# Patient Record
Sex: FEMALE | Race: WHITE | Employment: OTHER | URBAN - METROPOLITAN AREA
[De-identification: names, ages, dates, MRNs, and addresses within clinical notes are randomized per-mention and may not be internally consistent; named-entity substitution may affect disease eponyms.]

---

## 2017-07-03 ENCOUNTER — TELEPHONE (OUTPATIENT)
Dept: INTERNAL MEDICINE CLINIC | Facility: CLINIC | Age: 82
End: 2017-07-03

## 2017-07-03 DIAGNOSIS — J44.9 CHRONIC OBSTRUCTIVE PULMONARY DISEASE, UNSPECIFIED COPD TYPE (HCC): ICD-10-CM

## 2017-07-03 DIAGNOSIS — Z51.81 MEDICATION MONITORING ENCOUNTER: ICD-10-CM

## 2017-07-03 DIAGNOSIS — E78.2 MIXED HYPERLIPIDEMIA: Primary | ICD-10-CM

## 2017-07-03 DIAGNOSIS — M16.11 ARTHRITIS OF RIGHT HIP: ICD-10-CM

## 2017-07-03 RX ORDER — ATORVASTATIN CALCIUM 10 MG/1
TABLET, FILM COATED ORAL
Qty: 30 TABLET | Refills: 0 | Status: SHIPPED | OUTPATIENT
Start: 2017-07-03 | End: 2017-07-10

## 2017-07-03 NOTE — TELEPHONE ENCOUNTER
Refilled per protocol for 30 days until next office visit on 7/10/17    Cholesterol Medications  Protocol Criteria:  · Appointment scheduled in the past 12 months or in the next 3 months  · ALT & LDL on file in the past 12 months  · ALT result < 80  · LDL

## 2017-07-03 NOTE — TELEPHONE ENCOUNTER
Patient is calling to request a refill for the medication listed below. Patient is a former patient of Dr. Jason Herrera but will be establishing care with Dr. Lundy Schilder for 07/10 at 11:40. Please advise.    Atorvastatin Calcium 10 MG Oral Tab Take 1 tablet (10 mg t

## 2017-07-03 NOTE — TELEPHONE ENCOUNTER
Please call the pt and tell them that I have sent the orders to the lab using the computer. Pt just needs to go to the lab. Pt should fast for 12 hours. Water and medications are okay.

## 2017-07-03 NOTE — TELEPHONE ENCOUNTER
Patient is calling to request lab orders for cholesterol, a1d, etc. Patient is a former patient of Dr. Adrien Romero but will be establishing care with Dr. Sheila Montes for 07/10 at 11:40. Patient wants to get labs done beforehand. Please advise.

## 2017-07-07 ENCOUNTER — LAB ENCOUNTER (OUTPATIENT)
Dept: LAB | Age: 82
End: 2017-07-07
Attending: INTERNAL MEDICINE
Payer: MEDICARE

## 2017-07-07 DIAGNOSIS — E78.2 MIXED HYPERLIPIDEMIA: ICD-10-CM

## 2017-07-07 DIAGNOSIS — Z51.81 MEDICATION MONITORING ENCOUNTER: ICD-10-CM

## 2017-07-07 DIAGNOSIS — M16.11 ARTHRITIS OF RIGHT HIP: ICD-10-CM

## 2017-07-07 DIAGNOSIS — J44.9 CHRONIC OBSTRUCTIVE PULMONARY DISEASE, UNSPECIFIED COPD TYPE (HCC): ICD-10-CM

## 2017-07-07 LAB
ALBUMIN SERPL BCP-MCNC: 3.9 G/DL (ref 3.5–4.8)
ALBUMIN/GLOB SERPL: 1.6 {RATIO} (ref 1–2)
ALP SERPL-CCNC: 71 U/L (ref 32–100)
ALT SERPL-CCNC: 15 U/L (ref 14–54)
ANION GAP SERPL CALC-SCNC: 8 MMOL/L (ref 0–18)
AST SERPL-CCNC: 21 U/L (ref 15–41)
BASOPHILS # BLD: 0.1 K/UL (ref 0–0.2)
BASOPHILS NFR BLD: 1 %
BILIRUB SERPL-MCNC: 1.3 MG/DL (ref 0.3–1.2)
BILIRUB UR QL: NEGATIVE
BUN SERPL-MCNC: 12 MG/DL (ref 8–20)
BUN/CREAT SERPL: 14.3 (ref 10–20)
CALCIUM SERPL-MCNC: 8.9 MG/DL (ref 8.5–10.5)
CHLORIDE SERPL-SCNC: 107 MMOL/L (ref 95–110)
CHOLEST SERPL-MCNC: 169 MG/DL (ref 110–200)
CLARITY UR: CLEAR
CO2 SERPL-SCNC: 27 MMOL/L (ref 22–32)
COLOR UR: YELLOW
CREAT SERPL-MCNC: 0.84 MG/DL (ref 0.5–1.5)
EOSINOPHIL # BLD: 0.2 K/UL (ref 0–0.7)
EOSINOPHIL NFR BLD: 3 %
ERYTHROCYTE [DISTWIDTH] IN BLOOD BY AUTOMATED COUNT: 13.5 % (ref 11–15)
GLOBULIN PLAS-MCNC: 2.5 G/DL (ref 2.5–3.7)
GLUCOSE SERPL-MCNC: 87 MG/DL (ref 70–99)
GLUCOSE UR-MCNC: NEGATIVE MG/DL
HCT VFR BLD AUTO: 44.3 % (ref 35–48)
HDLC SERPL-MCNC: 65 MG/DL
HGB BLD-MCNC: 15.2 G/DL (ref 12–16)
HGB UR QL STRIP.AUTO: NEGATIVE
KETONES UR-MCNC: NEGATIVE MG/DL
LDLC SERPL CALC-MCNC: 88 MG/DL (ref 0–99)
LYMPHOCYTES # BLD: 1.4 K/UL (ref 1–4)
LYMPHOCYTES NFR BLD: 22 %
MCH RBC QN AUTO: 32.6 PG (ref 27–32)
MCHC RBC AUTO-ENTMCNC: 34.2 G/DL (ref 32–37)
MCV RBC AUTO: 95.2 FL (ref 80–100)
MONOCYTES # BLD: 0.5 K/UL (ref 0–1)
MONOCYTES NFR BLD: 8 %
NEUTROPHILS # BLD AUTO: 4.1 K/UL (ref 1.8–7.7)
NEUTROPHILS NFR BLD: 66 %
NITRITE UR QL STRIP.AUTO: NEGATIVE
NONHDLC SERPL-MCNC: 104 MG/DL
OSMOLALITY UR CALC.SUM OF ELEC: 293 MOSM/KG (ref 275–295)
PH UR: 5 [PH] (ref 5–8)
PLATELET # BLD AUTO: 169 K/UL (ref 140–400)
PMV BLD AUTO: 8.6 FL (ref 7.4–10.3)
POTASSIUM SERPL-SCNC: 3.7 MMOL/L (ref 3.3–5.1)
PROT SERPL-MCNC: 6.4 G/DL (ref 5.9–8.4)
PROT UR-MCNC: 30 MG/DL
RBC # BLD AUTO: 4.65 M/UL (ref 3.7–5.4)
RBC #/AREA URNS AUTO: 1 /HPF
SODIUM SERPL-SCNC: 142 MMOL/L (ref 136–144)
SP GR UR STRIP: 1.03 (ref 1–1.03)
TRIGL SERPL-MCNC: 78 MG/DL (ref 1–149)
TSH SERPL-ACNC: 1.67 UIU/ML (ref 0.45–5.33)
UROBILINOGEN UR STRIP-ACNC: <2
VIT C UR-MCNC: 40 MG/DL
WBC # BLD AUTO: 6.3 K/UL (ref 4–11)
WBC #/AREA URNS AUTO: 5 /HPF

## 2017-07-07 PROCEDURE — 80053 COMPREHEN METABOLIC PANEL: CPT

## 2017-07-07 PROCEDURE — 36415 COLL VENOUS BLD VENIPUNCTURE: CPT

## 2017-07-07 PROCEDURE — 80061 LIPID PANEL: CPT

## 2017-07-07 PROCEDURE — 84443 ASSAY THYROID STIM HORMONE: CPT

## 2017-07-07 PROCEDURE — 81001 URINALYSIS AUTO W/SCOPE: CPT | Performed by: INTERNAL MEDICINE

## 2017-07-07 PROCEDURE — 85025 COMPLETE CBC W/AUTO DIFF WBC: CPT

## 2017-07-07 RX ORDER — BUDESONIDE AND FORMOTEROL FUMARATE DIHYDRATE 160; 4.5 UG/1; UG/1
AEROSOL RESPIRATORY (INHALATION)
Qty: 1 INHALER | Refills: 0 | Status: SHIPPED | OUTPATIENT
Start: 2017-07-07 | End: 2017-08-04

## 2017-07-07 NOTE — TELEPHONE ENCOUNTER
Refill Protocol Appointment Criteria  · Appointment scheduled in the past 12 months or in the next 3 months  Recent Outpatient Visits            10 months ago Chronic obstructive pulmonary disease, unspecified COPD type [facility])    [address]

## 2017-07-10 ENCOUNTER — OFFICE VISIT (OUTPATIENT)
Dept: INTERNAL MEDICINE CLINIC | Facility: CLINIC | Age: 82
End: 2017-07-10

## 2017-07-10 VITALS
DIASTOLIC BLOOD PRESSURE: 72 MMHG | SYSTOLIC BLOOD PRESSURE: 123 MMHG | WEIGHT: 147.38 LBS | HEIGHT: 66 IN | RESPIRATION RATE: 18 BRPM | BODY MASS INDEX: 23.69 KG/M2

## 2017-07-10 DIAGNOSIS — M25.561 CHRONIC PAIN OF BOTH KNEES: Primary | ICD-10-CM

## 2017-07-10 DIAGNOSIS — R82.71 BACTERIURIA: ICD-10-CM

## 2017-07-10 DIAGNOSIS — Z12.31 VISIT FOR SCREENING MAMMOGRAM: ICD-10-CM

## 2017-07-10 DIAGNOSIS — E78.2 MIXED HYPERLIPIDEMIA: ICD-10-CM

## 2017-07-10 DIAGNOSIS — G89.29 CHRONIC PAIN OF BOTH KNEES: Primary | ICD-10-CM

## 2017-07-10 DIAGNOSIS — J44.9 CHRONIC OBSTRUCTIVE PULMONARY DISEASE, UNSPECIFIED COPD TYPE (HCC): ICD-10-CM

## 2017-07-10 DIAGNOSIS — M25.562 CHRONIC PAIN OF BOTH KNEES: Primary | ICD-10-CM

## 2017-07-10 DIAGNOSIS — Z23 NEED FOR VACCINATION: ICD-10-CM

## 2017-07-10 LAB
APPEARANCE: CLEAR
BILIRUBIN: NEGATIVE
GLUCOSE (URINE DIPSTICK): NEGATIVE MG/DL
KETONES (URINE DIPSTICK): NEGATIVE MG/DL
LEUKOCYTES: NEGATIVE
MULTISTIX LOT#: NORMAL NUMERIC
NITRITE, URINE: NEGATIVE
OCCULT BLOOD: NEGATIVE
PH, URINE: 5 (ref 4.5–8)
PROTEIN (URINE DIPSTICK): NEGATIVE MG/DL
SPECIFIC GRAVITY: 1.03 (ref 1–1.03)
URINE-COLOR: YELLOW
UROBILINOGEN,SEMI-QN: 0.2 MG/DL (ref 0–1.9)

## 2017-07-10 PROCEDURE — G0009 ADMIN PNEUMOCOCCAL VACCINE: HCPCS | Performed by: INTERNAL MEDICINE

## 2017-07-10 PROCEDURE — 90670 PCV13 VACCINE IM: CPT | Performed by: INTERNAL MEDICINE

## 2017-07-10 PROCEDURE — 99214 OFFICE O/P EST MOD 30 MIN: CPT | Performed by: INTERNAL MEDICINE

## 2017-07-10 PROCEDURE — G0463 HOSPITAL OUTPT CLINIC VISIT: HCPCS | Performed by: INTERNAL MEDICINE

## 2017-07-10 PROCEDURE — 81002 URINALYSIS NONAUTO W/O SCOPE: CPT | Performed by: INTERNAL MEDICINE

## 2017-07-10 RX ORDER — ATORVASTATIN CALCIUM 10 MG/1
10 TABLET, FILM COATED ORAL
Qty: 90 TABLET | Refills: 1 | Status: SHIPPED | OUTPATIENT
Start: 2017-07-10 | End: 2018-01-11

## 2017-07-10 NOTE — PROGRESS NOTES
HPI:    Patient ID: Flako Branham is a 80year old female. Pt is a former pt of Dr. Ck Lugo. She has a lot of arthritis. She has knee problems and back pain. She had a THR a year ago. She doesn't want more replacement. Her breathing is okay.   Claritza Rodriguez Allergies:  Neosporin Original *    Itching   PHYSICAL EXAM:   Physical Exam   Nursing note and vitals reviewed. Constitutional: She is oriented to person, place, and time. She appears well-developed and well-nourished.    HENT:   Head: Normocephalic

## 2017-07-10 NOTE — ADDENDUM NOTE
Encounter addended by:  Isra Mcknight LPN on: 5/00/3299  3:46 PM<BR>    Actions taken:  activity accessed

## 2017-08-04 ENCOUNTER — OFFICE VISIT (OUTPATIENT)
Dept: OPTOMETRY | Facility: CLINIC | Age: 82
End: 2017-08-04

## 2017-08-04 DIAGNOSIS — H52.4 PRESBYOPIA: ICD-10-CM

## 2017-08-04 DIAGNOSIS — H35.30 MACULAR DEGENERATION: Primary | ICD-10-CM

## 2017-08-04 PROCEDURE — 92004 COMPRE OPH EXAM NEW PT 1/>: CPT | Performed by: OPTOMETRIST

## 2017-08-04 NOTE — ASSESSMENT & PLAN NOTE
I advised patient that she has dry macular degeneration and that she should continue to use her I caps and wear sunglasses during all outdoor activities. Patient does not smoke .  I advised patient to continue to monitor her vision at far and at near when s

## 2017-08-04 NOTE — PATIENT INSTRUCTIONS
Macular degeneration  I advised patient that she has dry macular degeneration and that she should continue to use her I caps and wear sunglasses during all outdoor activities. Patient does not smoke .  I advised patient to continue to monitor her vision at

## 2017-08-04 NOTE — PROGRESS NOTES
Ivelisse Medina is a 80year old female. HPI:     HPI     Patient is in for an annual eye exam. Patient last had an EE at Dr. Lynda Mi office. She had cataract surgery ou at his office and was told that she had macular degeneration .   She takes I cap 160-4.5 MCG/ACT Inhalation Aerosol INHALE 2 PUFFS BY MOUTH TWO TIMES A DAY Disp: 1 Inhaler Rfl: 0   Multiple Vitamins-Minerals (ICAPS AREDS FORMULA) Oral Tab Take by mouth.  Disp:  Rfl:    Albuterol Sulfate HFA (PROAIR HFA) 108 (90 BASE) MCG/ACT Inhalation degeneration dry Age related macular degeneration dry    Vessels Normal Normal    Periphery Normal Normal            Refraction     Wearing Rx       Sphere Cylinder    Right +2.75 Sphere    Left +2.75 Sphere    Type:  OTC reading only          Manifest Ref

## 2017-08-07 ENCOUNTER — OFFICE VISIT (OUTPATIENT)
Dept: ORTHOPEDICS CLINIC | Facility: CLINIC | Age: 82
End: 2017-08-07

## 2017-08-07 ENCOUNTER — HOSPITAL ENCOUNTER (OUTPATIENT)
Dept: GENERAL RADIOLOGY | Facility: HOSPITAL | Age: 82
Discharge: HOME | End: 2017-08-07
Attending: ORTHOPAEDIC SURGERY | Admitting: ORTHOPAEDIC SURGERY
Payer: MEDICARE

## 2017-08-07 VITALS — SYSTOLIC BLOOD PRESSURE: 134 MMHG | DIASTOLIC BLOOD PRESSURE: 82 MMHG | HEART RATE: 80 BPM | RESPIRATION RATE: 16 BRPM

## 2017-08-07 DIAGNOSIS — M25.562 PAIN IN BOTH KNEES, UNSPECIFIED CHRONICITY: ICD-10-CM

## 2017-08-07 DIAGNOSIS — M17.12 PRIMARY LOCALIZED OSTEOARTHROSIS, LOWER LEG, LEFT: ICD-10-CM

## 2017-08-07 DIAGNOSIS — M25.561 PAIN IN BOTH KNEES, UNSPECIFIED CHRONICITY: Primary | ICD-10-CM

## 2017-08-07 DIAGNOSIS — M25.562 PAIN IN BOTH KNEES, UNSPECIFIED CHRONICITY: Primary | ICD-10-CM

## 2017-08-07 DIAGNOSIS — M25.561 PAIN IN BOTH KNEES, UNSPECIFIED CHRONICITY: ICD-10-CM

## 2017-08-07 DIAGNOSIS — M17.11 PRIMARY LOCALIZED OSTEOARTHROSIS, LOWER LEG, RIGHT: ICD-10-CM

## 2017-08-07 PROCEDURE — 20610 DRAIN/INJ JOINT/BURSA W/O US: CPT | Performed by: ORTHOPAEDIC SURGERY

## 2017-08-07 PROCEDURE — 99214 OFFICE O/P EST MOD 30 MIN: CPT | Performed by: ORTHOPAEDIC SURGERY

## 2017-08-07 PROCEDURE — 3E0U3GC INTRODUCTION OF OTHER THERAPEUTIC SUBSTANCE INTO JOINTS, PERCUTANEOUS APPROACH: ICD-10-PCS | Performed by: ORTHOPAEDIC SURGERY

## 2017-08-07 PROCEDURE — 73565 X-RAY EXAM OF KNEES: CPT | Performed by: ORTHOPAEDIC SURGERY

## 2017-08-07 PROCEDURE — 73560 X-RAY EXAM OF KNEE 1 OR 2: CPT | Performed by: ORTHOPAEDIC SURGERY

## 2017-08-07 RX ORDER — BUDESONIDE AND FORMOTEROL FUMARATE DIHYDRATE 160; 4.5 UG/1; UG/1
AEROSOL RESPIRATORY (INHALATION)
Qty: 1 INHALER | Refills: 3 | Status: SHIPPED | OUTPATIENT
Start: 2017-08-07 | End: 2017-12-05

## 2017-08-07 NOTE — TELEPHONE ENCOUNTER
Last seen 8-23-16  Last refill 7-7-17  Rx routed to [address] [place] [address] for sign off

## 2017-08-07 NOTE — H&P
Chief Complaint: Bilateral knee pain, right worse than left    NURSING INTAKE COMMENTS: Patient presents with:  Knee Pain: Bilateral - onset long time ago - after her total RHA doen over 1 year ago her knees became worse - has pain in the anteraior aspect from the patient intake forms and electronic medical record. Pertinent positives and negatives noted in the the HPI. Physical Examination:  There is no height or weight on file to calculate BMI. This 80year old female is A&O in no acute distress.   K NSAID's if not contraindicated from a medical standpoint  Cortisone injections

## 2017-08-07 NOTE — PROGRESS NOTES
Per verbal order from VT, draw up 3ml of Kenalog 10 and 3ml of 1% lidocaine for cortisone injection to bilateral knee.  Aure John

## 2017-08-22 ENCOUNTER — HOSPITAL ENCOUNTER (OUTPATIENT)
Dept: MAMMOGRAPHY | Facility: HOSPITAL | Age: 82
Discharge: HOME | End: 2017-08-22
Attending: INTERNAL MEDICINE
Payer: MEDICARE

## 2017-08-22 DIAGNOSIS — Z12.31 VISIT FOR SCREENING MAMMOGRAM: ICD-10-CM

## 2017-08-22 PROCEDURE — 77067 SCR MAMMO BI INCL CAD: CPT | Performed by: INTERNAL MEDICINE

## 2017-12-05 RX ORDER — BUDESONIDE AND FORMOTEROL FUMARATE DIHYDRATE 160; 4.5 UG/1; UG/1
AEROSOL RESPIRATORY (INHALATION)
Qty: 1 INHALER | Refills: 5 | Status: SHIPPED | OUTPATIENT
Start: 2017-12-05 | End: 2018-06-22

## 2017-12-05 NOTE — TELEPHONE ENCOUNTER
LR: 8/7/17  LOV: 8/23/16  Rx sent to MD for sign off. Notified pt that refill req sent to MD & he should sign off on rx today. She verbalized understanding.

## 2017-12-05 NOTE — TELEPHONE ENCOUNTER
Pt. Is requesting to get a New Rx for Simbicort med.        Current Outpatient Prescriptions:  SYMBICORT 160-4.5 MCG/ACT Inhalation Aerosol INHALE TWO PUFFS INTO THE LUNGS BY MOUTH TWO TIMES A DAY Disp: 1 Inhaler Rfl: 3

## 2018-01-15 RX ORDER — ATORVASTATIN CALCIUM 10 MG/1
TABLET, FILM COATED ORAL
Qty: 90 TABLET | Refills: 0 | Status: SHIPPED | OUTPATIENT
Start: 2018-01-15 | End: 2018-02-21

## 2018-01-15 NOTE — TELEPHONE ENCOUNTER
Cholesterol Medications; please advise on refill. Last OV acute. Thanks.   Protocol Criteria:  · Appointment scheduled in the past 12 months or in the next 3 months  · ALT & LDL on file in the past 12 months  · ALT result < 80  · LDL result <130   Recent Ou

## 2018-02-12 ENCOUNTER — OFFICE VISIT (OUTPATIENT)
Dept: ORTHOPEDICS CLINIC | Facility: CLINIC | Age: 83
End: 2018-02-12

## 2018-02-12 VITALS — DIASTOLIC BLOOD PRESSURE: 94 MMHG | SYSTOLIC BLOOD PRESSURE: 166 MMHG | RESPIRATION RATE: 12 BRPM | HEART RATE: 62 BPM

## 2018-02-12 DIAGNOSIS — M17.11 PRIMARY LOCALIZED OSTEOARTHROSIS OF RIGHT LOWER LEG: ICD-10-CM

## 2018-02-12 DIAGNOSIS — M17.12 PRIMARY LOCALIZED OSTEOARTHROSIS OF LEFT LOWER LEG: Primary | ICD-10-CM

## 2018-02-12 PROCEDURE — 20610 DRAIN/INJ JOINT/BURSA W/O US: CPT | Performed by: ORTHOPAEDIC SURGERY

## 2018-02-12 NOTE — PROGRESS NOTES
Per verbal order from VT, draw up 3ml of Kenalog 10 and 3ml of 1% lidocaine for cortisone injections x 2  to Bilateral knees. VS prior to injeciton BP elevated. Pt had taken her medications today.  Does have an appointment later this week with primary physi

## 2018-02-21 ENCOUNTER — OFFICE VISIT (OUTPATIENT)
Dept: INTERNAL MEDICINE CLINIC | Facility: CLINIC | Age: 83
End: 2018-02-21

## 2018-02-21 VITALS
WEIGHT: 146 LBS | HEIGHT: 66 IN | HEART RATE: 81 BPM | DIASTOLIC BLOOD PRESSURE: 72 MMHG | BODY MASS INDEX: 23.46 KG/M2 | SYSTOLIC BLOOD PRESSURE: 112 MMHG

## 2018-02-21 DIAGNOSIS — M17.10 ARTHRITIS OF KNEE: ICD-10-CM

## 2018-02-21 DIAGNOSIS — E78.2 MIXED HYPERLIPIDEMIA: Primary | ICD-10-CM

## 2018-02-21 PROCEDURE — 99213 OFFICE O/P EST LOW 20 MIN: CPT | Performed by: INTERNAL MEDICINE

## 2018-02-21 PROCEDURE — G0463 HOSPITAL OUTPT CLINIC VISIT: HCPCS | Performed by: INTERNAL MEDICINE

## 2018-02-21 RX ORDER — ATORVASTATIN CALCIUM 10 MG/1
TABLET, FILM COATED ORAL
Qty: 90 TABLET | Refills: 1 | Status: SHIPPED | OUTPATIENT
Start: 2018-02-21 | End: 2018-12-28

## 2018-02-21 NOTE — PROGRESS NOTES
HPI:    Patient ID: Jostin Ramirez is a 80year old female. Pt had some seepage from her hemorrhoids, but then it cleared up. No problems with her medication. She gets some JHA. She had an injection in her knees and they feel better.           Rev Diabetes Neg        ./72 (BP Location: Right arm, Patient Position: Sitting, Cuff Size: large)   Pulse 81   Ht 5' 6\" (1.676 m)   Wt 146 lb (66.2 kg)   BMI 23.57 kg/m²   PHYSICAL EXAM:   Physical Exam   Nursing note and vitals reviewed.    Josefina

## 2018-04-16 ENCOUNTER — HOSPITAL ENCOUNTER (OUTPATIENT)
Dept: GENERAL RADIOLOGY | Age: 83
Discharge: HOME | End: 2018-04-16
Attending: INTERNAL MEDICINE | Admitting: INTERNAL MEDICINE
Payer: MEDICARE

## 2018-04-16 ENCOUNTER — NURSE TRIAGE (OUTPATIENT)
Dept: OTHER | Age: 83
End: 2018-04-16

## 2018-04-16 ENCOUNTER — OFFICE VISIT (OUTPATIENT)
Dept: INTERNAL MEDICINE CLINIC | Facility: CLINIC | Age: 83
End: 2018-04-16

## 2018-04-16 VITALS
SYSTOLIC BLOOD PRESSURE: 139 MMHG | HEIGHT: 66 IN | TEMPERATURE: 98 F | DIASTOLIC BLOOD PRESSURE: 85 MMHG | WEIGHT: 146.19 LBS | HEART RATE: 76 BPM | BODY MASS INDEX: 23.49 KG/M2

## 2018-04-16 DIAGNOSIS — J44.9 CHRONIC OBSTRUCTIVE PULMONARY DISEASE, UNSPECIFIED COPD TYPE (HCC): ICD-10-CM

## 2018-04-16 DIAGNOSIS — J06.9 UPPER RESPIRATORY TRACT INFECTION, UNSPECIFIED TYPE: ICD-10-CM

## 2018-04-16 DIAGNOSIS — J06.9 UPPER RESPIRATORY TRACT INFECTION, UNSPECIFIED TYPE: Primary | ICD-10-CM

## 2018-04-16 PROCEDURE — G0463 HOSPITAL OUTPT CLINIC VISIT: HCPCS | Performed by: INTERNAL MEDICINE

## 2018-04-16 PROCEDURE — 99214 OFFICE O/P EST MOD 30 MIN: CPT | Performed by: INTERNAL MEDICINE

## 2018-04-16 PROCEDURE — 71046 X-RAY EXAM CHEST 2 VIEWS: CPT | Performed by: INTERNAL MEDICINE

## 2018-04-16 RX ORDER — AZITHROMYCIN 250 MG/1
TABLET, FILM COATED ORAL
Qty: 6 TABLET | Refills: 0 | Status: SHIPPED | OUTPATIENT
Start: 2018-04-16 | End: 2018-04-25 | Stop reason: ALTCHOICE

## 2018-04-16 RX ORDER — BENZONATATE 100 MG/1
100 CAPSULE ORAL 3 TIMES DAILY PRN
Qty: 20 CAPSULE | Refills: 0 | Status: SHIPPED | OUTPATIENT
Start: 2018-04-16 | End: 2018-04-23

## 2018-04-16 RX ORDER — ALBUTEROL SULFATE 90 UG/1
2 AEROSOL, METERED RESPIRATORY (INHALATION) EVERY 6 HOURS PRN
Qty: 1 INHALER | Refills: 5 | Status: SHIPPED | OUTPATIENT
Start: 2018-04-16 | End: 2021-10-26

## 2018-04-16 NOTE — TELEPHONE ENCOUNTER
Action Requested: Summary for Provider     []  Critical Lab, Recommendations Needed  [x] Need Additional Advice  []   FYI    []   Need Orders  [x] Need Medications Sent to Pharmacy  []  Other     SUMMARY: [address] pt stated that for the past two weeks sh

## 2018-04-16 NOTE — ASSESSMENT & PLAN NOTE
Pt has had a productive cough for several weeks, COPD, O2 sat is lower than normal and she is elderly. Will do CXR and Rx zpak.

## 2018-04-16 NOTE — TELEPHONE ENCOUNTER
Pt stated that 2 weeks. Coughing up phlegm white and yellow. Emphesemia and had the pneumonia . No chest pain but do have sob with ativity. Pt denied having a fever no bodyaches.  Pt stated that she hate

## 2018-04-16 NOTE — PROGRESS NOTES
HPI:    Patient ID: Kiara Rodriguez is a 80year old female. Pt has been coughing and bringing up yellow sputum. This has been going on for 2 weeks. URI    This is a new problem. The current episode started 1 to 4 weeks ago.  The problem has bee as needed.    Disp:  Rfl:        Allergies:  Neosporin Original *    Itching     Smoking status: Former Smoker                                                              Packs/day: 1.00      Years: 30.00        Types: Cigarettes     Quit date: 1/1/1990  S Aero Soln    Other Relevant Orders    XR CHEST PA + LAT CHEST (CPT=71046)    COPD (chronic obstructive pulmonary disease) (HCC)     Continue inhalers.          Relevant Orders    XR CHEST PA + LAT CHEST (CPT=71046)              Meds This Visit:  Signed Pres

## 2018-04-16 NOTE — TELEPHONE ENCOUNTER
Spoke with pt and Dr Anna Kerr message relayed. Pt agrees to come today for OV at 5:20 at [facility].

## 2018-04-17 ENCOUNTER — TELEPHONE (OUTPATIENT)
Dept: OTHER | Age: 83
End: 2018-04-17

## 2018-04-17 NOTE — TELEPHONE ENCOUNTER
----- Message from Reny Paniagua MD sent at 4/17/2018  9:01 AM CDT -----  Please call pt:  Her chest x-ray is okay. It shows the emphysema, but no pneumonia. She should take the medicine as I prescribed and call if she does not improve.

## 2018-04-17 NOTE — PROGRESS NOTES
Please call pt:  Her chest x-ray is okay. It shows the emphysema, but no pneumonia. She should take the medicine as I prescribed and call if she does not improve.

## 2018-04-17 NOTE — TELEPHONE ENCOUNTER
Advised patient on Dr. Carmen Mueller information and recommendations. Patient verbalized understanding, had no questions.

## 2018-04-25 ENCOUNTER — OFFICE VISIT (OUTPATIENT)
Dept: INTERNAL MEDICINE CLINIC | Facility: CLINIC | Age: 83
End: 2018-04-25

## 2018-04-25 VITALS
WEIGHT: 147.31 LBS | OXYGEN SATURATION: 91 % | TEMPERATURE: 98 F | HEART RATE: 77 BPM | DIASTOLIC BLOOD PRESSURE: 79 MMHG | HEIGHT: 66 IN | RESPIRATION RATE: 14 BRPM | SYSTOLIC BLOOD PRESSURE: 124 MMHG | BODY MASS INDEX: 23.67 KG/M2

## 2018-04-25 DIAGNOSIS — J06.9 UPPER RESPIRATORY TRACT INFECTION, UNSPECIFIED TYPE: Primary | ICD-10-CM

## 2018-04-25 DIAGNOSIS — J44.9 CHRONIC OBSTRUCTIVE PULMONARY DISEASE, UNSPECIFIED COPD TYPE (HCC): ICD-10-CM

## 2018-04-25 PROCEDURE — 99213 OFFICE O/P EST LOW 20 MIN: CPT | Performed by: INTERNAL MEDICINE

## 2018-04-25 PROCEDURE — G0463 HOSPITAL OUTPT CLINIC VISIT: HCPCS | Performed by: INTERNAL MEDICINE

## 2018-04-25 RX ORDER — DIPHENHYDRAMINE HCL 25 MG
25 CAPSULE ORAL AS NEEDED
Refills: 0 | Status: CANCELLED | OUTPATIENT
Start: 2018-04-25

## 2018-04-25 RX ORDER — AZITHROMYCIN 250 MG/1
TABLET, FILM COATED ORAL
COMMUNITY
Start: 2018-04-16 | End: 2018-04-25

## 2018-04-25 NOTE — PROGRESS NOTES
HPI:    Patient ID: Westley Kirkpatrick is a 80year old female. She is feeling much better. Today she had a little hacky cough and some yellow sputum. Her nasal symptoms are better. Cough   This is a new problem.  The current episode started 1 to • Hypertension Father    • Heart Disease Father    • Gastro-Intestinal Disorder Maternal Grandmother      TUBERCULOSIS   • Breast Cancer Maternal Aunt      [de-identified]   • Glaucoma Neg    • Diabetes Neg        ./79 (BP Location: Right arm, Patient Position

## 2018-04-26 ENCOUNTER — TELEPHONE (OUTPATIENT)
Dept: OTHER | Age: 83
End: 2018-04-26

## 2018-04-26 NOTE — TELEPHONE ENCOUNTER
Pt stated she was seen yesterday but forgot to ask if she can start back taking Lipitor  , mention it had been on hold since it conflicted with another one of her meds but can't remember which med , stated she have been eating less  Fatty foods    Please r

## 2018-06-22 RX ORDER — BUDESONIDE AND FORMOTEROL FUMARATE DIHYDRATE 160; 4.5 UG/1; UG/1
AEROSOL RESPIRATORY (INHALATION)
Qty: 10.2 INHALER | Refills: 0 | Status: SHIPPED | OUTPATIENT
Start: 2018-06-22 | End: 2018-07-16

## 2018-07-16 RX ORDER — BUDESONIDE AND FORMOTEROL FUMARATE DIHYDRATE 160; 4.5 UG/1; UG/1
AEROSOL RESPIRATORY (INHALATION)
Qty: 10.2 INHALER | Refills: 0 | Status: SHIPPED | OUTPATIENT
Start: 2018-07-16 | End: 2018-08-15

## 2018-08-15 RX ORDER — BUDESONIDE AND FORMOTEROL FUMARATE DIHYDRATE 160; 4.5 UG/1; UG/1
AEROSOL RESPIRATORY (INHALATION)
Qty: 1 INHALER | Refills: 0 | Status: SHIPPED | OUTPATIENT
Start: 2018-08-15 | End: 2018-09-07

## 2018-08-15 NOTE — TELEPHONE ENCOUNTER
Last seen 8-23-16  Last refill 7-16-18  Next appt 9-25-18  Rx routed to [facility] for sign off.

## 2018-08-23 ENCOUNTER — OFFICE VISIT (OUTPATIENT)
Dept: OPTOMETRY | Facility: CLINIC | Age: 83
End: 2018-08-23
Payer: MEDICARE

## 2018-08-23 DIAGNOSIS — H35.3131 EARLY DRY STAGE NONEXUDATIVE AGE-RELATED MACULAR DEGENERATION OF BOTH EYES: Primary | ICD-10-CM

## 2018-08-23 DIAGNOSIS — H52.4 PRESBYOPIA: ICD-10-CM

## 2018-08-23 PROCEDURE — 92014 COMPRE OPH EXAM EST PT 1/>: CPT | Performed by: OPTOMETRIST

## 2018-08-23 NOTE — PROGRESS NOTES
Gena Jama is a 80year old female. HPI:     HPI     Patient is in for an annual eye exam. She is a former patient of Dr. Raj Schmidt  and had phaco there ou. She also was DX with ARMD--dry-- that she takes I caps for.  Patient uses only OTC reading g Rfl: 5   atorvastatin 10 MG Oral Tab Take 1 tablet by mouth daily Disp: 90 tablet Rfl: 1   Multiple Vitamins-Minerals (ICAPS AREDS FORMULA) Oral Tab Take 1 tablet by mouth daily.    Disp:  Rfl:    aspirin 81 MG Oral Tab Take 2 tablets by mouth daily  Disp: Normal    Periphery Normal Normal            Refraction     Wearing Rx       Sphere    Right +2.75    Left +2.75    Type:  OTC reading only          Manifest Refraction       Sphere Cylinder [place]    Right [place] -1.00 070 20/30-    Left [place] -1.25

## 2018-08-23 NOTE — PROGRESS NOTES
Padmini Stone is a 80year old female. HPI:     HPI     Patient is in for an annual eye exam. She is a former patient of Dr. Tyra Naik  and had phaco there ou. She also was DX with ARMD--dry-- that she takes I caps for.  Patient uses only OTC reading g Rfl: 5   atorvastatin 10 MG Oral Tab Take 1 tablet by mouth daily Disp: 90 tablet Rfl: 1   Multiple Vitamins-Minerals (ICAPS AREDS FORMULA) Oral Tab Take 1 tablet by mouth daily.    Disp:  Rfl:    aspirin 81 MG Oral Tab Take 2 tablets by mouth daily  Disp: Normal    Periphery Normal Normal            Refraction     Wearing Rx       Sphere    Right +2.75    Left +2.75    Type:  OTC reading only          Manifest Refraction       Sphere Cylinder [place]    Right [place] -1.00 070 20/30-    Left [place] -1.25

## 2018-08-23 NOTE — PATIENT INSTRUCTIONS
Macular degeneration  I advised patient that her ARMD is dry and stable. She should continue with her I caps and continue to wear sunglasses. Patient does not smoke. I advised that she can use a grid or she can check her eyes monocularly at near point.  She

## 2018-08-23 NOTE — ASSESSMENT & PLAN NOTE
I advised patient that her ARMD is dry and stable. She should continue with her I caps and continue to wear sunglasses. Patient does not smoke. I advised that she can use a grid or she can check her eyes monocularly at near point.  She should have no proble

## 2018-08-24 ENCOUNTER — TELEPHONE (OUTPATIENT)
Dept: INTERNAL MEDICINE CLINIC | Facility: CLINIC | Age: 83
End: 2018-08-24

## 2018-08-24 DIAGNOSIS — Z12.39 BREAST CANCER SCREENING: Primary | ICD-10-CM

## 2018-08-25 NOTE — TELEPHONE ENCOUNTER
Please notify the pt that I signed the order for her mammogram.  She should call [phone] to schedule it.

## 2018-08-28 ENCOUNTER — LAB ENCOUNTER (OUTPATIENT)
Dept: LAB | Age: 83
End: 2018-08-28
Attending: INTERNAL MEDICINE
Payer: MEDICARE

## 2018-08-28 DIAGNOSIS — E78.2 MIXED HYPERLIPIDEMIA: ICD-10-CM

## 2018-08-28 LAB
ALBUMIN SERPL BCP-MCNC: 3.6 G/DL (ref 3.5–4.8)
ALBUMIN/GLOB SERPL: 1.4 {RATIO} (ref 1–2)
ALP SERPL-CCNC: 70 U/L (ref 32–100)
ALT SERPL-CCNC: 15 U/L (ref 14–54)
ANION GAP SERPL CALC-SCNC: 8 MMOL/L (ref 0–18)
AST SERPL-CCNC: 20 U/L (ref 15–41)
BASOPHILS # BLD: 0 K/UL (ref 0–0.2)
BASOPHILS NFR BLD: 1 %
BILIRUB SERPL-MCNC: 1.6 MG/DL (ref 0.3–1.2)
BUN SERPL-MCNC: 10 MG/DL (ref 8–20)
BUN/CREAT SERPL: 12.5 (ref 10–20)
CALCIUM SERPL-MCNC: 8.9 MG/DL (ref 8.5–10.5)
CHLORIDE SERPL-SCNC: 106 MMOL/L (ref 95–110)
CHOLEST SERPL-MCNC: 157 MG/DL (ref 110–200)
CO2 SERPL-SCNC: 27 MMOL/L (ref 22–32)
CREAT SERPL-MCNC: 0.8 MG/DL (ref 0.5–1.5)
EOSINOPHIL # BLD: 0.1 K/UL (ref 0–0.7)
EOSINOPHIL NFR BLD: 2 %
ERYTHROCYTE [DISTWIDTH] IN BLOOD BY AUTOMATED COUNT: 14.1 % (ref 11–15)
GLOBULIN PLAS-MCNC: 2.5 G/DL (ref 2.5–3.7)
GLUCOSE SERPL-MCNC: 93 MG/DL (ref 70–99)
HCT VFR BLD AUTO: 41.6 % (ref 35–48)
HDLC SERPL-MCNC: 60 MG/DL
HGB BLD-MCNC: 14.8 G/DL (ref 12–16)
LDLC SERPL CALC-MCNC: 81 MG/DL (ref 0–99)
LYMPHOCYTES # BLD: 1.4 K/UL (ref 1–4)
LYMPHOCYTES NFR BLD: 29 %
MCH RBC QN AUTO: 36.6 PG (ref 27–32)
MCHC RBC AUTO-ENTMCNC: 35.6 G/DL (ref 32–37)
MCV RBC AUTO: 102.8 FL (ref 80–100)
MONOCYTES # BLD: 0.4 K/UL (ref 0–1)
MONOCYTES NFR BLD: 9 %
NEUTROPHILS # BLD AUTO: 2.8 K/UL (ref 1.8–7.7)
NEUTROPHILS NFR BLD: 60 %
NONHDLC SERPL-MCNC: 97 MG/DL
OSMOLALITY UR CALC.SUM OF ELEC: 291 MOSM/KG (ref 275–295)
PATIENT FASTING: YES
PLATELET # BLD AUTO: 199 K/UL (ref 140–400)
PMV BLD AUTO: 8.1 FL (ref 7.4–10.3)
POTASSIUM SERPL-SCNC: 3.6 MMOL/L (ref 3.3–5.1)
PROT SERPL-MCNC: 6.1 G/DL (ref 5.9–8.4)
RBC # BLD AUTO: 4.05 M/UL (ref 3.7–5.4)
SODIUM SERPL-SCNC: 141 MMOL/L (ref 136–144)
TRIGL SERPL-MCNC: 82 MG/DL (ref 1–149)
TSH SERPL-ACNC: 3.47 UIU/ML (ref 0.45–5.33)
WBC # BLD AUTO: 4.7 K/UL (ref 4–11)

## 2018-08-28 PROCEDURE — 80053 COMPREHEN METABOLIC PANEL: CPT

## 2018-08-28 PROCEDURE — 80061 LIPID PANEL: CPT

## 2018-08-28 PROCEDURE — 85025 COMPLETE CBC W/AUTO DIFF WBC: CPT

## 2018-08-28 PROCEDURE — 36415 COLL VENOUS BLD VENIPUNCTURE: CPT

## 2018-08-28 PROCEDURE — 84443 ASSAY THYROID STIM HORMONE: CPT

## 2018-09-08 RX ORDER — BUDESONIDE AND FORMOTEROL FUMARATE DIHYDRATE 160; 4.5 UG/1; UG/1
AEROSOL RESPIRATORY (INHALATION)
Qty: 1 INHALER | Refills: 1 | Status: SHIPPED | OUTPATIENT
Start: 2018-09-08 | End: 2018-11-23

## 2018-09-24 ENCOUNTER — HOSPITAL ENCOUNTER (OUTPATIENT)
Dept: MAMMOGRAPHY | Facility: HOSPITAL | Age: 83
Discharge: HOME | End: 2018-09-24
Attending: INTERNAL MEDICINE
Payer: MEDICARE

## 2018-09-24 DIAGNOSIS — Z12.39 BREAST CANCER SCREENING: ICD-10-CM

## 2018-09-24 PROCEDURE — 77067 SCR MAMMO BI INCL CAD: CPT | Performed by: INTERNAL MEDICINE

## 2018-09-24 PROCEDURE — 77063 BREAST TOMOSYNTHESIS BI: CPT | Performed by: INTERNAL MEDICINE

## 2018-09-25 ENCOUNTER — OFFICE VISIT (OUTPATIENT)
Dept: PULMONOLOGY | Facility: CLINIC | Age: 83
End: 2018-09-25
Payer: MEDICARE

## 2018-09-25 VITALS
RESPIRATION RATE: 18 BRPM | SYSTOLIC BLOOD PRESSURE: 136 MMHG | HEIGHT: 65 IN | BODY MASS INDEX: 24.39 KG/M2 | OXYGEN SATURATION: 94 % | DIASTOLIC BLOOD PRESSURE: 84 MMHG | WEIGHT: 146.38 LBS | HEART RATE: 65 BPM

## 2018-09-25 DIAGNOSIS — J44.9 CHRONIC OBSTRUCTIVE PULMONARY DISEASE, UNSPECIFIED COPD TYPE (HCC): Primary | ICD-10-CM

## 2018-09-25 PROCEDURE — 94010 BREATHING CAPACITY TEST: CPT | Performed by: INTERNAL MEDICINE

## 2018-09-25 PROCEDURE — 99213 OFFICE O/P EST LOW 20 MIN: CPT | Performed by: INTERNAL MEDICINE

## 2018-09-25 PROCEDURE — G0463 HOSPITAL OUTPT CLINIC VISIT: HCPCS | Performed by: INTERNAL MEDICINE

## 2018-09-25 NOTE — PROGRESS NOTES
The patient is a 43-year-old female who I know well from prior evaluation comes in now for follow-up. In general, she is doing well. She has underlying COPD and she has a prior history of pleural effusion after chest wall trauma. She is doing well now.

## 2018-10-04 ENCOUNTER — APPOINTMENT (OUTPATIENT)
Dept: ULTRASOUND IMAGING | Facility: HOSPITAL | Age: 83
End: 2018-10-04
Attending: EMERGENCY MEDICINE
Payer: MEDICARE

## 2018-10-04 ENCOUNTER — HOSPITAL ENCOUNTER (EMERGENCY)
Facility: HOSPITAL | Age: 83
Discharge: HOME | End: 2018-10-04
Attending: EMERGENCY MEDICINE
Payer: MEDICARE

## 2018-10-04 ENCOUNTER — NURSE TRIAGE (OUTPATIENT)
Dept: OTHER | Age: 83
End: 2018-10-04

## 2018-10-04 VITALS
TEMPERATURE: 98 F | BODY MASS INDEX: 24.16 KG/M2 | OXYGEN SATURATION: 95 % | WEIGHT: 145 LBS | DIASTOLIC BLOOD PRESSURE: 64 MMHG | RESPIRATION RATE: 18 BRPM | HEART RATE: 97 BPM | HEIGHT: 65 IN | SYSTOLIC BLOOD PRESSURE: 144 MMHG

## 2018-10-04 DIAGNOSIS — L03.116 CELLULITIS OF LEFT LOWER EXTREMITY: Primary | ICD-10-CM

## 2018-10-04 PROCEDURE — 36415 COLL VENOUS BLD VENIPUNCTURE: CPT

## 2018-10-04 PROCEDURE — 80048 BASIC METABOLIC PNL TOTAL CA: CPT | Performed by: EMERGENCY MEDICINE

## 2018-10-04 PROCEDURE — 85025 COMPLETE CBC W/AUTO DIFF WBC: CPT | Performed by: EMERGENCY MEDICINE

## 2018-10-04 PROCEDURE — 99284 EMERGENCY DEPT VISIT MOD MDM: CPT

## 2018-10-04 PROCEDURE — 93971 EXTREMITY STUDY: CPT | Performed by: EMERGENCY MEDICINE

## 2018-10-04 RX ORDER — CEPHALEXIN 500 MG/1
500 CAPSULE ORAL 2 TIMES DAILY
Qty: 10 CAPSULE | Refills: 0 | Status: SHIPPED | OUTPATIENT
Start: 2018-10-04 | End: 2018-10-09

## 2018-10-04 NOTE — TELEPHONE ENCOUNTER
Action Requested: Summary for Provider     []  Critical Lab, Recommendations Needed  [] Need Additional Advice  []   FYI    []   Need Orders  [] Need Medications Sent to Pharmacy  []  Other     SUMMARY: sent to ER- Pt stated she cut the back of her left

## 2018-10-04 NOTE — ED PROVIDER NOTES
Patient Seen in: [facility] AND [facility] Emergency Department    History   Patient presents with:  Cellulitis (integumentary, infectious)    Stated Complaint: left leg wound     HPI    Patient complains of  Swollen l leg.   Started last week has a small ulcer o Breast Cancer Maternal Aunt         80s   • Glaucoma Neg    • Diabetes Neg        Social History    Tobacco Use      Smoking status: Former Smoker        Packs/day: 1.00        Years: 30.00        Pack years: 30        Types: Cigarettes        Quit date: 1 normal limits   CBC WITH DIFFERENTIAL WITH PLATELET    Narrative: The following orders were created for panel order CBC WITH DIFFERENTIAL WITH PLATELET.   Procedure                               Abnormality         Status                     --------- diagnosis)    Disposition:  Discharge  10/4/2018  2:40 pm    Follow-up:  Ld Way MD  [address] [facility]  [phone]    In 2 days      We recommend that you schedule follow up care with a primary care provider within the next th

## 2018-10-04 NOTE — ED NOTES
Patient arrives with complaints of left calf wound and swelling x 1 week. Denies numbness or tingling. Denies injury to area.

## 2018-10-04 NOTE — TELEPHONE ENCOUNTER
[facility] assist lonny ER f/u     Disposition         Discharge          AVS      1 ED/IC AVS (Printed 10/4/2018)   2 AVS SIG ONLY (Printed 10/4/2018)         Follow-Ups: Follow up with Gagandeep Reyes MD (Internal Medicine) in 2 days (10/6/2018

## 2018-10-04 NOTE — CM/SW NOTE
Follow up appointment schedule with Dr. Mago Arthur Saturday 10/6 @ 9:30 am @ [facility] 2nd floor.

## 2018-10-06 ENCOUNTER — OFFICE VISIT (OUTPATIENT)
Dept: INTERNAL MEDICINE CLINIC | Facility: CLINIC | Age: 83
End: 2018-10-06
Payer: MEDICARE

## 2018-10-06 VITALS
BODY MASS INDEX: 23.63 KG/M2 | TEMPERATURE: 98 F | SYSTOLIC BLOOD PRESSURE: 115 MMHG | WEIGHT: 147 LBS | HEIGHT: 66 IN | DIASTOLIC BLOOD PRESSURE: 76 MMHG | HEART RATE: 90 BPM

## 2018-10-06 DIAGNOSIS — L97.921 SKIN ULCER OF LEFT LOWER LEG, LIMITED TO BREAKDOWN OF SKIN (HCC): Primary | ICD-10-CM

## 2018-10-06 PROCEDURE — G0463 HOSPITAL OUTPT CLINIC VISIT: HCPCS | Performed by: INTERNAL MEDICINE

## 2018-10-06 PROCEDURE — 99213 OFFICE O/P EST LOW 20 MIN: CPT | Performed by: INTERNAL MEDICINE

## 2018-10-06 NOTE — PATIENT INSTRUCTIONS
Please continue and finish your antibiotic. Apply a dry dressing every day. Follow-up with Dr. Seun Carney in 7-10 days.

## 2018-10-06 NOTE — PROGRESS NOTES
Gena Jama is a 80year old female. Patient presents with:  Wound: left leg-pt states she may have injured it getting out of the car. went to ER 10/04    HPI:   Ms. Pa Verma presents this morning for ER follow-up.     About 1 week ago, she noticed bl Bilateral      Comment:  Dr. Manuel Howell office  No date: GLAUCOMA SURGERY  No date: HIP REPLACEMENT SURGERY  Excision of Basal cell carcinoma of face : SKIN SURGERY  No date: TONSILLECTOMY      Comment:  T&A   Social History:  Social History    Tobacco Use

## 2018-10-09 ENCOUNTER — ORDER TRANSCRIPTION (OUTPATIENT)
Dept: WOUND CARE | Facility: HOSPITAL | Age: 83
End: 2018-10-09

## 2018-10-09 DIAGNOSIS — S81.809A OPEN WOUND OF KNEE, LEG (EXCEPT THIGH), AND ANKLE, COMPLICATED: Primary | ICD-10-CM

## 2018-10-09 DIAGNOSIS — S91.009A OPEN WOUND OF KNEE, LEG (EXCEPT THIGH), AND ANKLE, COMPLICATED: Primary | ICD-10-CM

## 2018-10-09 DIAGNOSIS — S81.009A OPEN WOUND OF KNEE, LEG (EXCEPT THIGH), AND ANKLE, COMPLICATED: Primary | ICD-10-CM

## 2018-10-16 ENCOUNTER — OFFICE VISIT (OUTPATIENT)
Dept: WOUND CARE | Facility: HOSPITAL | Age: 83
End: 2018-10-16
Attending: NURSE PRACTITIONER
Payer: MEDICARE

## 2018-10-16 DIAGNOSIS — S81.009A OPEN WOUND OF KNEE, LEG (EXCEPT THIGH), AND ANKLE, COMPLICATED: Primary | ICD-10-CM

## 2018-10-16 DIAGNOSIS — I87.2 VENOUS INSUFFICIENCY: ICD-10-CM

## 2018-10-16 DIAGNOSIS — S81.809A OPEN WOUND OF KNEE, LEG (EXCEPT THIGH), AND ANKLE, COMPLICATED: Primary | ICD-10-CM

## 2018-10-16 DIAGNOSIS — S91.009A OPEN WOUND OF KNEE, LEG (EXCEPT THIGH), AND ANKLE, COMPLICATED: Primary | ICD-10-CM

## 2018-10-16 PROCEDURE — 29581 APPL MULTLAYER CMPRN SYS LEG: CPT

## 2018-10-16 PROCEDURE — 97162 PT EVAL MOD COMPLEX 30 MIN: CPT

## 2018-10-16 NOTE — PROGRESS NOTES
Subjective    Chief Complaint  This information was obtained from the patient  10/16/2018 \"I feel ok no pain but I can not really see the wound it's on the back of my leg\".      Allergies  Neosporin ([id number]) (Reaction: itching)    HPI  This informa tablet once daily  albuterol sulfate - inhalation 90 mcg/actuation HFA aerosol inhaler every 6 hours as needed  Symbicort - inhalation 160 mcg/actuation-4.5 mcg/actuation HFA aerosol inhaler twice daily  aspirin - oral 81 mg 2 tablet,chewable once daily Reduce necrosis by 100%  Decrease depth by 75%     Written PT Goals - Long Term (8-12 weeks)  Reduce wound area by 100%  Wound closure  Achieve wound closure to promote return to normal functional gait     Active Problems    ICD-10  S81.009A - Unspecified

## 2018-10-23 ENCOUNTER — OFFICE VISIT (OUTPATIENT)
Dept: WOUND CARE | Facility: HOSPITAL | Age: 83
End: 2018-10-23
Attending: NURSE PRACTITIONER
Payer: MEDICARE

## 2018-10-23 DIAGNOSIS — S91.009A OPEN WOUND OF KNEE, LEG (EXCEPT THIGH), AND ANKLE, COMPLICATED: Primary | ICD-10-CM

## 2018-10-23 DIAGNOSIS — S81.009A OPEN WOUND OF KNEE, LEG (EXCEPT THIGH), AND ANKLE, COMPLICATED: Primary | ICD-10-CM

## 2018-10-23 DIAGNOSIS — I87.2 VENOUS INSUFFICIENCY: ICD-10-CM

## 2018-10-23 DIAGNOSIS — S81.809A OPEN WOUND OF KNEE, LEG (EXCEPT THIGH), AND ANKLE, COMPLICATED: Primary | ICD-10-CM

## 2018-10-23 PROCEDURE — 29581 APPL MULTLAYER CMPRN SYS LEG: CPT

## 2018-10-23 NOTE — PROGRESS NOTES
Subjective    Chief Complaint  This information was obtained from the patient  10/16/2018 \"I feel ok no pain but I can not really see the wound it's on the back of my leg\". 10/23/2018 \"I feel good and the leg looks better I can barely see the wound\". Pt presents with decreased wound area and volume. The wound is resolving well with current course of treatment. Xeroform was applied to promote moist wound healing environment.  Multi layer compression was applied to reduce edema to promote wound resolution

## 2018-10-30 ENCOUNTER — OFFICE VISIT (OUTPATIENT)
Dept: WOUND CARE | Facility: HOSPITAL | Age: 83
End: 2018-10-30
Attending: NURSE PRACTITIONER
Payer: MEDICARE

## 2018-10-30 DIAGNOSIS — S81.809A OPEN WOUND OF KNEE, LEG (EXCEPT THIGH), AND ANKLE, COMPLICATED: Primary | ICD-10-CM

## 2018-10-30 DIAGNOSIS — I87.2 VENOUS INSUFFICIENCY: ICD-10-CM

## 2018-10-30 DIAGNOSIS — S91.009A OPEN WOUND OF KNEE, LEG (EXCEPT THIGH), AND ANKLE, COMPLICATED: Primary | ICD-10-CM

## 2018-10-30 DIAGNOSIS — S81.009A OPEN WOUND OF KNEE, LEG (EXCEPT THIGH), AND ANKLE, COMPLICATED: Primary | ICD-10-CM

## 2018-10-30 PROCEDURE — 99213 OFFICE O/P EST LOW 20 MIN: CPT

## 2018-10-30 PROCEDURE — 29581 APPL MULTLAYER CMPRN SYS LEG: CPT

## 2018-10-30 NOTE — PROGRESS NOTES
Subjective    Chief Complaint  This information was obtained from the patient  10/30/2018 \" I went to [facility] and it looks like my stockings are the wrong size\".     Allergies  Neosporin ([id number]) (Reaction: itching)    HPI  This informati Pt presents with a pair of stocking she purchased but they were the wrong size 5 cm too short. The pt was refereed to Dani Richey to be measured and fit for optimal results.  The wound was cleansed and redressed with a xeroform gauze and multi layer co

## 2018-11-06 ENCOUNTER — OFFICE VISIT (OUTPATIENT)
Dept: WOUND CARE | Facility: HOSPITAL | Age: 83
End: 2018-11-06
Attending: NURSE PRACTITIONER
Payer: MEDICARE

## 2018-11-06 DIAGNOSIS — I87.2 VENOUS INSUFFICIENCY: ICD-10-CM

## 2018-11-06 DIAGNOSIS — S81.809A OPEN WOUND OF KNEE, LEG (EXCEPT THIGH), AND ANKLE, COMPLICATED: Primary | ICD-10-CM

## 2018-11-06 DIAGNOSIS — S81.009A OPEN WOUND OF KNEE, LEG (EXCEPT THIGH), AND ANKLE, COMPLICATED: Primary | ICD-10-CM

## 2018-11-06 DIAGNOSIS — S91.009A OPEN WOUND OF KNEE, LEG (EXCEPT THIGH), AND ANKLE, COMPLICATED: Primary | ICD-10-CM

## 2018-11-06 PROCEDURE — 99213 OFFICE O/P EST LOW 20 MIN: CPT

## 2018-11-06 NOTE — PROGRESS NOTES
Subjective    Chief Complaint  This information was obtained from the patient  10/30/2018 \" I went to [facility] and it looks like my stockings are the wrong size\".  11/6/2018 \" I went to hospitals at [facility] and she was so nice thanks for ref Pt presents with a resolved wound that was covered with xeroform and secured with Medipore to allow for maximal tissue maturation. The pt was instructed in donning and doffing 15-20 mmHg compression stockings for edema management.  Pt will return for one la

## 2018-11-12 ENCOUNTER — OFFICE VISIT (OUTPATIENT)
Dept: WOUND CARE | Facility: HOSPITAL | Age: 83
End: 2018-11-12
Attending: NURSE PRACTITIONER
Payer: MEDICARE

## 2018-11-12 DIAGNOSIS — S91.009A OPEN WOUND OF KNEE, LEG (EXCEPT THIGH), AND ANKLE, COMPLICATED: Primary | ICD-10-CM

## 2018-11-12 DIAGNOSIS — I87.2 VENOUS INSUFFICIENCY: ICD-10-CM

## 2018-11-12 DIAGNOSIS — S81.009A OPEN WOUND OF KNEE, LEG (EXCEPT THIGH), AND ANKLE, COMPLICATED: Primary | ICD-10-CM

## 2018-11-12 DIAGNOSIS — S81.809A OPEN WOUND OF KNEE, LEG (EXCEPT THIGH), AND ANKLE, COMPLICATED: Primary | ICD-10-CM

## 2018-11-12 PROCEDURE — 99212 OFFICE O/P EST SF 10 MIN: CPT

## 2018-11-12 NOTE — PROGRESS NOTES
Subjective    Chief Complaint  This information was obtained from the patient  11/12/2018: Pt wondering about the scabs on her healed wound    Allergies  Neosporin ([id number]) (Reaction: itching)    HPI  This information was obtained from the patient PT wound care. All goals have been met and pt is aware of recommendations. The projected goal for this patient was [id number], 0% impaired. The patient's functional level at discharge is [id number], 0% impaired, determined by clinical judgement.          Elec

## 2018-11-13 ENCOUNTER — APPOINTMENT (OUTPATIENT)
Dept: WOUND CARE | Facility: HOSPITAL | Age: 83
End: 2018-11-13
Attending: NURSE PRACTITIONER
Payer: MEDICARE

## 2018-11-20 ENCOUNTER — APPOINTMENT (OUTPATIENT)
Dept: WOUND CARE | Facility: HOSPITAL | Age: 83
End: 2018-11-20
Attending: NURSE PRACTITIONER
Payer: MEDICARE

## 2018-11-23 RX ORDER — BUDESONIDE AND FORMOTEROL FUMARATE DIHYDRATE 160; 4.5 UG/1; UG/1
AEROSOL RESPIRATORY (INHALATION)
Qty: 1 INHALER | Refills: 3 | Status: SHIPPED | OUTPATIENT
Start: 2018-11-23 | End: 2019-03-25

## 2018-11-27 ENCOUNTER — APPOINTMENT (OUTPATIENT)
Dept: WOUND CARE | Facility: HOSPITAL | Age: 83
End: 2018-11-27
Attending: NURSE PRACTITIONER
Payer: MEDICARE

## 2018-12-28 DIAGNOSIS — E78.2 MIXED HYPERLIPIDEMIA: ICD-10-CM

## 2018-12-28 RX ORDER — ATORVASTATIN CALCIUM 10 MG/1
TABLET, FILM COATED ORAL
Qty: 90 TABLET | Refills: 0 | Status: SHIPPED | OUTPATIENT
Start: 2018-12-28 | End: 2019-05-04

## 2019-03-25 RX ORDER — BUDESONIDE AND FORMOTEROL FUMARATE DIHYDRATE 160; 4.5 UG/1; UG/1
AEROSOL RESPIRATORY (INHALATION)
Qty: 1 INHALER | Refills: 2 | Status: SHIPPED | OUTPATIENT
Start: 2019-03-25 | End: 2020-05-26

## 2019-03-25 NOTE — TELEPHONE ENCOUNTER
LOV 9/25/18, F/U scheduled tomorrow  Last refilled 11/23/18  Routed to [facility]

## 2019-03-26 ENCOUNTER — OFFICE VISIT (OUTPATIENT)
Dept: PULMONOLOGY | Facility: CLINIC | Age: 84
End: 2019-03-26
Payer: MEDICARE

## 2019-03-26 VITALS
SYSTOLIC BLOOD PRESSURE: 142 MMHG | DIASTOLIC BLOOD PRESSURE: 87 MMHG | HEIGHT: 66 IN | WEIGHT: 140 LBS | OXYGEN SATURATION: 89 % | BODY MASS INDEX: 22.5 KG/M2 | HEART RATE: 74 BPM

## 2019-03-26 DIAGNOSIS — J44.9 CHRONIC OBSTRUCTIVE PULMONARY DISEASE, UNSPECIFIED COPD TYPE (HCC): Primary | ICD-10-CM

## 2019-03-26 PROCEDURE — 99213 OFFICE O/P EST LOW 20 MIN: CPT | Performed by: INTERNAL MEDICINE

## 2019-03-26 PROCEDURE — 94010 BREATHING CAPACITY TEST: CPT | Performed by: INTERNAL MEDICINE

## 2019-03-26 PROCEDURE — G0463 HOSPITAL OUTPT CLINIC VISIT: HCPCS | Performed by: INTERNAL MEDICINE

## 2019-03-26 RX ORDER — BUDESONIDE AND FORMOTEROL FUMARATE DIHYDRATE 160; 4.5 UG/1; UG/1
2 AEROSOL RESPIRATORY (INHALATION) 2 TIMES DAILY
Qty: 1 INHALER | Refills: 6 | Status: SHIPPED | OUTPATIENT
Start: 2019-03-26 | End: 2019-11-26

## 2019-03-26 NOTE — PROGRESS NOTES
The patient is a 69-year-old female who I know well from prior evaluation comes in now for follow-up. She notes that her breathing is about the same. She has mild uncomfortable awareness of her breathing and this may be limiting her ability to golf.   She

## 2019-04-16 ENCOUNTER — OFFICE VISIT (OUTPATIENT)
Dept: INTERNAL MEDICINE CLINIC | Facility: CLINIC | Age: 84
End: 2019-04-16
Payer: MEDICARE

## 2019-04-16 VITALS
BODY MASS INDEX: 22.13 KG/M2 | HEIGHT: 66 IN | HEART RATE: 80 BPM | SYSTOLIC BLOOD PRESSURE: 130 MMHG | OXYGEN SATURATION: 90 % | DIASTOLIC BLOOD PRESSURE: 79 MMHG | WEIGHT: 137.69 LBS

## 2019-04-16 DIAGNOSIS — E78.2 MIXED HYPERLIPIDEMIA: Primary | ICD-10-CM

## 2019-04-16 DIAGNOSIS — J44.9 CHRONIC OBSTRUCTIVE PULMONARY DISEASE, UNSPECIFIED COPD TYPE (HCC): ICD-10-CM

## 2019-04-16 DIAGNOSIS — M19.90 OSTEOARTHRITIS, UNSPECIFIED OSTEOARTHRITIS TYPE, UNSPECIFIED SITE: ICD-10-CM

## 2019-04-16 DIAGNOSIS — I87.2 VENOUS INSUFFICIENCY: ICD-10-CM

## 2019-04-16 PROBLEM — S91.009A OPEN WOUND OF KNEE, LEG (EXCEPT THIGH), AND ANKLE, COMPLICATED: Status: RESOLVED | Noted: 2018-10-16 | Resolved: 2019-04-16

## 2019-04-16 PROBLEM — S81.809A OPEN WOUND OF KNEE, LEG (EXCEPT THIGH), AND ANKLE, COMPLICATED: Status: RESOLVED | Noted: 2018-10-16 | Resolved: 2019-04-16

## 2019-04-16 PROBLEM — S81.009A OPEN WOUND OF KNEE, LEG (EXCEPT THIGH), AND ANKLE, COMPLICATED: Status: RESOLVED | Noted: 2018-10-16 | Resolved: 2019-04-16

## 2019-04-16 PROBLEM — J06.9 UPPER RESPIRATORY TRACT INFECTION: Status: RESOLVED | Noted: 2018-04-16 | Resolved: 2019-04-16

## 2019-04-16 PROCEDURE — 99214 OFFICE O/P EST MOD 30 MIN: CPT | Performed by: INTERNAL MEDICINE

## 2019-04-16 PROCEDURE — G0463 HOSPITAL OUTPT CLINIC VISIT: HCPCS | Performed by: INTERNAL MEDICINE

## 2019-04-16 NOTE — PROGRESS NOTES
HPI:    Patient ID: Augusto Hernandez is a 80year old female. Pt c/o arthritis pain all over. Knees, back, shoulders. She walks with a cane. She was golfing, but she gave it up for this coming season. She thinks she would need oxygen to pay golf. TAKE ONE TABLET BY MOUTH ONE TIME DAILY Disp: 90 tablet Rfl: 0   Albuterol Sulfate  (90 Base) MCG/ACT Inhalation Aero Soln Inhale 2 puffs into the lungs every 6 (six) hours as needed (cough).  Disp: 1 Inhaler Rfl: 5   Multiple Vitamins-Minerals (ICAP Problem List Items Addressed This Visit        Unprioritized    Mixed hyperlipidemia - Primary     Controlled. Continue present management.          Relevant Orders    LIPID PANEL    AST (SGOT)    ALT (SGPT)    Chronic obstructive pulmonary disease (HC

## 2019-04-16 NOTE — ASSESSMENT & PLAN NOTE
Discussed pt's symptoms. Pt does not want pulmonary rehab. Encouraged pt to remain as active as possible, including playing golf. Pt will f/u with Dr. Myles Woodson.

## 2019-05-04 DIAGNOSIS — E78.2 MIXED HYPERLIPIDEMIA: ICD-10-CM

## 2019-05-04 RX ORDER — ATORVASTATIN CALCIUM 10 MG/1
TABLET, FILM COATED ORAL
Qty: 90 TABLET | Refills: 1 | Status: SHIPPED | OUTPATIENT
Start: 2019-05-04 | End: 2019-11-26

## 2019-08-06 ENCOUNTER — LAB ENCOUNTER (OUTPATIENT)
Dept: LAB | Age: 84
End: 2019-08-06
Attending: INTERNAL MEDICINE
Payer: MEDICARE

## 2019-08-06 DIAGNOSIS — J44.9 CHRONIC OBSTRUCTIVE PULMONARY DISEASE, UNSPECIFIED COPD TYPE (HCC): ICD-10-CM

## 2019-08-06 DIAGNOSIS — E78.2 MIXED HYPERLIPIDEMIA: ICD-10-CM

## 2019-08-06 LAB
ALT SERPL-CCNC: 23 U/L (ref 13–56)
ANION GAP SERPL CALC-SCNC: 8 MMOL/L (ref 0–18)
AST SERPL-CCNC: 19 U/L (ref 15–37)
BASOPHILS # BLD AUTO: 0.04 X10(3) UL (ref 0–0.2)
BASOPHILS NFR BLD AUTO: 0.5 %
BUN BLD-MCNC: 19 MG/DL (ref 7–18)
BUN/CREAT SERPL: 22.9 (ref 10–20)
CALCIUM BLD-MCNC: 9.4 MG/DL (ref 8.5–10.1)
CHLORIDE SERPL-SCNC: 108 MMOL/L (ref 98–112)
CHOLEST SMN-MCNC: 151 MG/DL (ref ?–200)
CO2 SERPL-SCNC: 26 MMOL/L (ref 21–32)
CREAT BLD-MCNC: 0.83 MG/DL (ref 0.55–1.02)
DEPRECATED RDW RBC AUTO: 48 FL (ref 35.1–46.3)
EOSINOPHIL # BLD AUTO: 0.11 X10(3) UL (ref 0–0.7)
EOSINOPHIL NFR BLD AUTO: 1.5 %
ERYTHROCYTE [DISTWIDTH] IN BLOOD BY AUTOMATED COUNT: 14.2 % (ref 11–15)
GLUCOSE BLD-MCNC: 98 MG/DL (ref 70–99)
HCT VFR BLD AUTO: 43.5 % (ref 35–48)
HDLC SERPL-MCNC: 66 MG/DL (ref 40–59)
HGB BLD-MCNC: 16 G/DL (ref 12–16)
IMM GRANULOCYTES # BLD AUTO: 0.02 X10(3) UL (ref 0–1)
IMM GRANULOCYTES NFR BLD: 0.3 %
LDLC SERPL CALC-MCNC: 72 MG/DL (ref ?–100)
LYMPHOCYTES # BLD AUTO: 1.27 X10(3) UL (ref 1–4)
LYMPHOCYTES NFR BLD AUTO: 17.2 %
MCH RBC QN AUTO: 37.4 PG (ref 26–34)
MCHC RBC AUTO-ENTMCNC: 36.8 G/DL (ref 31–37)
MCV RBC AUTO: 101.6 FL (ref 80–100)
MONOCYTES # BLD AUTO: 0.63 X10(3) UL (ref 0.1–1)
MONOCYTES NFR BLD AUTO: 8.5 %
NEUTROPHILS # BLD AUTO: 5.31 X10 (3) UL (ref 1.5–7.7)
NEUTROPHILS # BLD AUTO: 5.31 X10(3) UL (ref 1.5–7.7)
NEUTROPHILS NFR BLD AUTO: 72 %
NONHDLC SERPL-MCNC: 85 MG/DL (ref ?–130)
OSMOLALITY SERPL CALC.SUM OF ELEC: 296 MOSM/KG (ref 275–295)
PATIENT FASTING: YES
PATIENT FASTING: YES
PLATELET # BLD AUTO: 218 10(3)UL (ref 150–450)
POTASSIUM SERPL-SCNC: 4.4 MMOL/L (ref 3.5–5.1)
RBC # BLD AUTO: 4.28 X10(6)UL (ref 3.8–5.3)
SODIUM SERPL-SCNC: 142 MMOL/L (ref 136–145)
TRIGL SERPL-MCNC: 66 MG/DL (ref 30–149)
VLDLC SERPL CALC-MCNC: 13 MG/DL (ref 0–30)
WBC # BLD AUTO: 7.4 X10(3) UL (ref 4–11)

## 2019-08-06 PROCEDURE — 84450 TRANSFERASE (AST) (SGOT): CPT

## 2019-08-06 PROCEDURE — 85025 COMPLETE CBC W/AUTO DIFF WBC: CPT

## 2019-08-06 PROCEDURE — 84460 ALANINE AMINO (ALT) (SGPT): CPT

## 2019-08-06 PROCEDURE — 80048 BASIC METABOLIC PNL TOTAL CA: CPT

## 2019-08-06 PROCEDURE — 80061 LIPID PANEL: CPT

## 2019-08-06 PROCEDURE — 36415 COLL VENOUS BLD VENIPUNCTURE: CPT

## 2019-09-20 ENCOUNTER — MED REC SCAN ONLY (OUTPATIENT)
Dept: INTERNAL MEDICINE CLINIC | Facility: CLINIC | Age: 84
End: 2019-09-20

## 2019-10-29 ENCOUNTER — TELEPHONE (OUTPATIENT)
Dept: PULMONOLOGY | Facility: CLINIC | Age: 84
End: 2019-10-29

## 2019-10-29 NOTE — TELEPHONE ENCOUNTER
Appt scheduled for 11/19/19 4:45. Pt notified of time date and place of appt. Pt verbalized understanding.

## 2019-11-19 ENCOUNTER — OFFICE VISIT (OUTPATIENT)
Dept: PULMONOLOGY | Facility: CLINIC | Age: 84
End: 2019-11-19
Payer: MEDICARE

## 2019-11-19 VITALS
DIASTOLIC BLOOD PRESSURE: 85 MMHG | OXYGEN SATURATION: 88 % | HEIGHT: 66 IN | BODY MASS INDEX: 20.95 KG/M2 | HEART RATE: 89 BPM | WEIGHT: 130.38 LBS | SYSTOLIC BLOOD PRESSURE: 142 MMHG

## 2019-11-19 DIAGNOSIS — J44.9 CHRONIC OBSTRUCTIVE PULMONARY DISEASE, UNSPECIFIED COPD TYPE (HCC): Primary | ICD-10-CM

## 2019-11-19 PROCEDURE — G0463 HOSPITAL OUTPT CLINIC VISIT: HCPCS | Performed by: INTERNAL MEDICINE

## 2019-11-19 PROCEDURE — 99213 OFFICE O/P EST LOW 20 MIN: CPT | Performed by: INTERNAL MEDICINE

## 2019-11-19 PROCEDURE — 94761 N-INVAS EAR/PLS OXIMETRY MLT: CPT | Performed by: INTERNAL MEDICINE

## 2019-11-19 NOTE — PROGRESS NOTES
The patient is a 80-year-old female who comes in now for follow-up. She still has some dyspnea on exertion and her room air saturation in the office today is 88%.   [facility] but she wants to wait and stick with the [facility]

## 2019-11-22 ENCOUNTER — TELEPHONE (OUTPATIENT)
Dept: PULMONOLOGY | Facility: CLINIC | Age: 84
End: 2019-11-22

## 2019-11-26 DIAGNOSIS — E78.2 MIXED HYPERLIPIDEMIA: ICD-10-CM

## 2019-11-26 RX ORDER — ATORVASTATIN CALCIUM 10 MG/1
TABLET, FILM COATED ORAL
Qty: 90 TABLET | Refills: 1 | Status: SHIPPED | OUTPATIENT
Start: 2019-11-26 | End: 2020-05-26

## 2019-11-26 RX ORDER — BUDESONIDE AND FORMOTEROL FUMARATE DIHYDRATE 160; 4.5 UG/1; UG/1
AEROSOL RESPIRATORY (INHALATION)
Qty: 1 INHALER | Refills: 5 | Status: SHIPPED | OUTPATIENT
Start: 2019-11-26 | End: 2020-05-22

## 2019-11-26 NOTE — TELEPHONE ENCOUNTER
Refill passed per [facility] protocol.   Cholesterol Medications  Protocol Criteria:  · Appointment scheduled in the past 12 months or in the next 3 months  · ALT & LDL on file in the past 12 months  · ALT result < 80  · LDL result <130   Recent Outpat

## 2019-12-12 ENCOUNTER — TELEPHONE (OUTPATIENT)
Dept: PULMONOLOGY | Facility: CLINIC | Age: 84
End: 2019-12-12

## 2019-12-12 NOTE — TELEPHONE ENCOUNTER
Certificate of medical necessity oxygen form received. Filled out and placed in [facility] folder for review and signature.

## 2020-05-22 DIAGNOSIS — E78.2 MIXED HYPERLIPIDEMIA: ICD-10-CM

## 2020-05-25 RX ORDER — BUDESONIDE AND FORMOTEROL FUMARATE DIHYDRATE 160; 4.5 UG/1; UG/1
AEROSOL RESPIRATORY (INHALATION)
Qty: 10.2 INHALER | Refills: 5 | Status: SHIPPED | OUTPATIENT
Start: 2020-05-25 | End: 2020-12-11

## 2020-05-26 ENCOUNTER — VIRTUAL PHONE E/M (OUTPATIENT)
Dept: INTERNAL MEDICINE CLINIC | Facility: CLINIC | Age: 85
End: 2020-05-26
Payer: MEDICARE

## 2020-05-26 VITALS — HEART RATE: 62 BPM | OXYGEN SATURATION: 95 %

## 2020-05-26 DIAGNOSIS — E78.2 MIXED HYPERLIPIDEMIA: ICD-10-CM

## 2020-05-26 DIAGNOSIS — J44.9 CHRONIC OBSTRUCTIVE PULMONARY DISEASE, UNSPECIFIED COPD TYPE (HCC): Primary | ICD-10-CM

## 2020-05-26 DIAGNOSIS — M19.90 OSTEOARTHRITIS, UNSPECIFIED OSTEOARTHRITIS TYPE, UNSPECIFIED SITE: ICD-10-CM

## 2020-05-26 DIAGNOSIS — D75.89 MACROCYTOSIS: ICD-10-CM

## 2020-05-26 PROCEDURE — 99443 PHONE E/M BY PHYS 21-30 MIN: CPT | Performed by: INTERNAL MEDICINE

## 2020-05-26 RX ORDER — ATORVASTATIN CALCIUM 10 MG/1
TABLET, FILM COATED ORAL
Qty: 90 TABLET | Refills: 0 | OUTPATIENT
Start: 2020-05-26

## 2020-05-26 RX ORDER — ATORVASTATIN CALCIUM 10 MG/1
10 TABLET, FILM COATED ORAL DAILY
Qty: 90 TABLET | Refills: 1 | Status: SHIPPED | OUTPATIENT
Start: 2020-05-26 | End: 2020-10-27

## 2020-05-26 NOTE — PATIENT INSTRUCTIONS
Do fasting blood tests before your next appointment on October 27th at 10:10. Fast for 12 hours. Water is okay. Walk in.   Do not take vitamins or supplements for 3 days prior to the blood test.

## 2020-05-26 NOTE — PROGRESS NOTES
Virtual/Telephone Check-In    Nathan Man verbally consents to a Virtual/Telephone Check-In service on 05/26/20. Patient understands and accepts financial responsibility for any deductible, co-insurance and/or co-pays associated with this service. (six) hours as needed (cough). 1 Inhaler 5   • Multiple Vitamins-Minerals (ICAPS AREDS FORMULA) Oral Tab Take 1 tablet by mouth daily.        • aspirin 81 MG Oral Tab Take 2 tablets by mouth daily          Allergies:  Neosporin Original *    ITCHING     Soc Continue present management. Osteoarthritis     Stable. Pain is controlled with Tylenol. Continue present management.            Other Visit Diagnoses     Macrocytosis        Relevant Orders    VITAMIN A01    FOLIC ACID SERUM(FOLATE)            Th

## 2020-10-27 ENCOUNTER — LAB ENCOUNTER (OUTPATIENT)
Dept: LAB | Facility: HOSPITAL | Age: 85
End: 2020-10-27
Attending: INTERNAL MEDICINE
Payer: MEDICARE

## 2020-10-27 ENCOUNTER — OFFICE VISIT (OUTPATIENT)
Dept: INTERNAL MEDICINE CLINIC | Facility: CLINIC | Age: 85
End: 2020-10-27
Payer: MEDICARE

## 2020-10-27 VITALS
WEIGHT: 125.13 LBS | HEART RATE: 94 BPM | HEIGHT: 66 IN | DIASTOLIC BLOOD PRESSURE: 86 MMHG | BODY MASS INDEX: 20.11 KG/M2 | SYSTOLIC BLOOD PRESSURE: 136 MMHG

## 2020-10-27 DIAGNOSIS — J44.9 CHRONIC OBSTRUCTIVE PULMONARY DISEASE, UNSPECIFIED COPD TYPE (HCC): Primary | ICD-10-CM

## 2020-10-27 DIAGNOSIS — R35.0 URINARY FREQUENCY: ICD-10-CM

## 2020-10-27 DIAGNOSIS — E78.00 HYPERCHOLESTEROLEMIA: ICD-10-CM

## 2020-10-27 DIAGNOSIS — E78.2 MIXED HYPERLIPIDEMIA: ICD-10-CM

## 2020-10-27 PROCEDURE — 80061 LIPID PANEL: CPT

## 2020-10-27 PROCEDURE — 99214 OFFICE O/P EST MOD 30 MIN: CPT | Performed by: INTERNAL MEDICINE

## 2020-10-27 PROCEDURE — 81001 URINALYSIS AUTO W/SCOPE: CPT

## 2020-10-27 PROCEDURE — G0463 HOSPITAL OUTPT CLINIC VISIT: HCPCS | Performed by: INTERNAL MEDICINE

## 2020-10-27 PROCEDURE — 87186 SC STD MICRODIL/AGAR DIL: CPT

## 2020-10-27 PROCEDURE — 80053 COMPREHEN METABOLIC PANEL: CPT

## 2020-10-27 PROCEDURE — 87086 URINE CULTURE/COLONY COUNT: CPT

## 2020-10-27 PROCEDURE — 87077 CULTURE AEROBIC IDENTIFY: CPT

## 2020-10-27 PROCEDURE — 36415 COLL VENOUS BLD VENIPUNCTURE: CPT

## 2020-10-27 RX ORDER — ATORVASTATIN CALCIUM 10 MG/1
10 TABLET, FILM COATED ORAL DAILY
Qty: 90 TABLET | Refills: 1 | Status: SHIPPED | OUTPATIENT
Start: 2020-10-27 | End: 2021-04-27

## 2020-10-27 NOTE — PROGRESS NOTES
HPI:    Patient ID: Ilene Jackson is a 80year old female. Her blood pressure is usually normal, but today it is high. She is not on blood pressure medication. She has arthritis pain. She takes Tylenol as needed. Her breathing is okay.   She has Tobacco Use      Smoking status: Former Smoker        Packs/day: 1.00        Years: 30.00        Pack years: 30        Types: Cigarettes        Quit date: 1990        Years since quittin.8      Smokeless tobacco: Former User    Alcohol use:  No Relevant Medications    atorvastatin 10 MG Oral Tab    Other Relevant Orders    COMP METABOLIC PANEL (14)    Chronic obstructive pulmonary disease ([facility] [address] - Primary     Controlled. Follow-up with pulmonary.          Urinary frequency     Check urine today

## 2020-10-28 NOTE — PROGRESS NOTES
Please call pt: Your urine culture showed that you had an infection. I will send an antibiotic to your pharmacy. If you do not improve in 2-4 days, please call or schedule a follow-up appointment. Your cholesterol panel is good.   Your blood sugar, kidn

## 2020-11-11 ENCOUNTER — TELEPHONE (OUTPATIENT)
Dept: PULMONOLOGY | Facility: CLINIC | Age: 85
End: 2020-11-11

## 2020-11-11 NOTE — TELEPHONE ENCOUNTER
CMS certificate form received and signed off by dr Terrence Heath faxed to [facility] [phone]. Confirmation received. Form sent for scanning.

## 2020-12-11 RX ORDER — BUDESONIDE AND FORMOTEROL FUMARATE DIHYDRATE 160; 4.5 UG/1; UG/1
AEROSOL RESPIRATORY (INHALATION)
Qty: 10.2 INHALER | Refills: 0 | Status: SHIPPED | OUTPATIENT
Start: 2020-12-11 | End: 2021-01-20

## 2020-12-11 NOTE — TELEPHONE ENCOUNTER
Last seen 11/19/19  Last refill  5/25/2020    Routed to [address] for sign off.

## 2021-01-14 ENCOUNTER — OFFICE VISIT (OUTPATIENT)
Dept: PULMONOLOGY | Facility: CLINIC | Age: 86
End: 2021-01-14
Payer: MEDICARE

## 2021-01-14 VITALS
RESPIRATION RATE: 19 BRPM | SYSTOLIC BLOOD PRESSURE: 132 MMHG | WEIGHT: 125 LBS | HEIGHT: 66 IN | HEART RATE: 86 BPM | BODY MASS INDEX: 20.09 KG/M2 | OXYGEN SATURATION: 93 % | DIASTOLIC BLOOD PRESSURE: 75 MMHG

## 2021-01-14 DIAGNOSIS — J44.9 CHRONIC OBSTRUCTIVE PULMONARY DISEASE, UNSPECIFIED COPD TYPE (HCC): Primary | ICD-10-CM

## 2021-01-14 PROCEDURE — 99214 OFFICE O/P EST MOD 30 MIN: CPT | Performed by: INTERNAL MEDICINE

## 2021-01-14 NOTE — PROGRESS NOTES
The patient is a 25-year-old female who I know well from prior evaluation who comes in now for follow-up. She has COPD. She remains on Symbicort and did not want to switch to Trelegy. She is doing well clinically now.   She is being careful with social d

## 2021-01-20 RX ORDER — BUDESONIDE AND FORMOTEROL FUMARATE DIHYDRATE 160; 4.5 UG/1; UG/1
AEROSOL RESPIRATORY (INHALATION)
Qty: 10.2 INHALER | Refills: 5 | Status: SHIPPED | OUTPATIENT
Start: 2021-01-20 | End: 2021-07-15

## 2021-03-09 DIAGNOSIS — Z23 NEED FOR VACCINATION: ICD-10-CM

## 2021-04-27 ENCOUNTER — OFFICE VISIT (OUTPATIENT)
Dept: INTERNAL MEDICINE CLINIC | Facility: CLINIC | Age: 86
End: 2021-04-27
Payer: MEDICARE

## 2021-04-27 VITALS
DIASTOLIC BLOOD PRESSURE: 83 MMHG | WEIGHT: 126 LBS | SYSTOLIC BLOOD PRESSURE: 129 MMHG | HEIGHT: 66 IN | RESPIRATION RATE: 16 BRPM | HEART RATE: 82 BPM | BODY MASS INDEX: 20.25 KG/M2

## 2021-04-27 DIAGNOSIS — Z00.00 MEDICARE ANNUAL WELLNESS VISIT, SUBSEQUENT: Primary | ICD-10-CM

## 2021-04-27 DIAGNOSIS — M19.90 OSTEOARTHRITIS, UNSPECIFIED OSTEOARTHRITIS TYPE, UNSPECIFIED SITE: ICD-10-CM

## 2021-04-27 DIAGNOSIS — J44.9 CHRONIC OBSTRUCTIVE PULMONARY DISEASE, UNSPECIFIED COPD TYPE (HCC): ICD-10-CM

## 2021-04-27 DIAGNOSIS — E78.2 MIXED HYPERLIPIDEMIA: ICD-10-CM

## 2021-04-27 PROBLEM — M17.10 ARTHRITIS OF KNEE: Status: RESOLVED | Noted: 2018-02-21 | Resolved: 2021-04-27

## 2021-04-27 PROCEDURE — 99214 OFFICE O/P EST MOD 30 MIN: CPT | Performed by: INTERNAL MEDICINE

## 2021-04-27 PROCEDURE — G0439 PPPS, SUBSEQ VISIT: HCPCS | Performed by: INTERNAL MEDICINE

## 2021-04-27 RX ORDER — ATORVASTATIN CALCIUM 10 MG/1
10 TABLET, FILM COATED ORAL DAILY
Qty: 90 TABLET | Refills: 1 | Status: SHIPPED | OUTPATIENT
Start: 2021-04-27 | End: 2021-10-26

## 2021-04-27 NOTE — PATIENT INSTRUCTIONS
[facility]'s SCREENING SCHEDULE   Tests on this list are recommended by your physician but may not be covered, or covered at this frequency, by your insurer. Please check with your insurance carrier before scheduling to verify coverage.    PREVENT abnormal There are no preventive care reminders to display for this patient. Update Health Maintenance if applicable    Flex Sigmoidoscopy Screen  Covered every 5 years No results found for this or any previous visit. No flowsheet data found.      Fecal Occ your 65th birthday    Pneumococcal 23 (Pneumovax)  Covered Once after 65 No orders found for this or any previous visit. Please get once after your 65th birthday    Hepatitis B for Moderate/High Risk       No orders found for this or any previous visit.  Me pain.

## 2021-04-27 NOTE — PROGRESS NOTES
HPI:   Carole Mcduffie is a 80year old female who presents for a Medicare Subsequent Annual Wellness visit (Pt already had Initial Annual Wellness). HPI:  She uses the inhaler twice a day, but still has JHA. She had both COVID vaccines.   She wond [employer] of [employer] for [employer] on file in [place].    Advance care planning including the explanation and discussion of advance directives standard forms performed Face to Face with patient and Family/surrogate (if present), and forms available to patient i total) by mouth daily. SYMBICORT 160-4.5 MCG/ACT Inhalation Aerosol, Inhale 2 puffs into the lungs 2 times daily   Albuterol Sulfate  (90 Base) MCG/ACT Inhalation Aero Soln, Inhale 2 puffs into the lungs every 6 (six) hours as needed (cough).   Mult Negative for dysuria and hematuria. Musculoskeletal: Positive for arthralgias. Skin: Negative for rash. Allergic/Immunologic: Negative for environmental allergies and food allergies. Neurological: Positive for headaches.    Psychiatric/Behavioral: N Visual Acuity                           Physical Exam  Vitals and nursing note reviewed. Constitutional:       General: She is not in acute distress. Appearance: She is well-developed. HENT:      Head: Normocephalic and atraumatic.       Right Ear: Continue present management. Osteoarthritis     Advised Tylenol 1 gm tid prn. Diet assessment: good     PLAN:  The patient indicates understanding of these issues and agrees to the plan.   Reinforced healthy diet, lifestyle, and exerci Pap and Pelvic      Pap: Every 3 yrs age 21-68 or Pap+HPV every 5 yrs age 33-67, age 72 and older at high risk There are no preventive care reminders to display for this patient.  Update Health Maintenance if applicable    Chlamydia  Annually if high risk

## 2021-04-27 NOTE — ASSESSMENT & PLAN NOTE
Unremarkable exam.  Immunizations were reviewed. Fall risk assessment was negative.   Hearing assessment was abnormal.

## 2021-07-15 ENCOUNTER — OFFICE VISIT (OUTPATIENT)
Dept: PULMONOLOGY | Facility: CLINIC | Age: 86
End: 2021-07-15
Payer: MEDICARE

## 2021-07-15 VITALS
SYSTOLIC BLOOD PRESSURE: 146 MMHG | BODY MASS INDEX: 20.83 KG/M2 | WEIGHT: 125 LBS | HEIGHT: 65 IN | DIASTOLIC BLOOD PRESSURE: 78 MMHG | OXYGEN SATURATION: 90 % | RESPIRATION RATE: 18 BRPM | HEART RATE: 84 BPM

## 2021-07-15 DIAGNOSIS — J44.9 CHRONIC OBSTRUCTIVE PULMONARY DISEASE, UNSPECIFIED COPD TYPE (HCC): Primary | ICD-10-CM

## 2021-07-15 PROCEDURE — 99213 OFFICE O/P EST LOW 20 MIN: CPT | Performed by: INTERNAL MEDICINE

## 2021-07-15 RX ORDER — BUDESONIDE AND FORMOTEROL FUMARATE DIHYDRATE 160; 4.5 UG/1; UG/1
AEROSOL RESPIRATORY (INHALATION)
Qty: 10.2 G | Refills: 5 | Status: SHIPPED | OUTPATIENT
Start: 2021-07-15 | End: 2022-02-01

## 2021-10-18 ENCOUNTER — LAB ENCOUNTER (OUTPATIENT)
Dept: LAB | Age: 86
End: 2021-10-18
Attending: INTERNAL MEDICINE
Payer: MEDICARE

## 2021-10-18 DIAGNOSIS — E78.2 MIXED HYPERLIPIDEMIA: ICD-10-CM

## 2021-10-18 PROCEDURE — 80061 LIPID PANEL: CPT

## 2021-10-18 PROCEDURE — 80053 COMPREHEN METABOLIC PANEL: CPT

## 2021-10-18 PROCEDURE — 85025 COMPLETE CBC W/AUTO DIFF WBC: CPT

## 2021-10-18 PROCEDURE — 36415 COLL VENOUS BLD VENIPUNCTURE: CPT

## 2021-10-26 ENCOUNTER — OFFICE VISIT (OUTPATIENT)
Dept: INTERNAL MEDICINE CLINIC | Facility: CLINIC | Age: 86
End: 2021-10-26
Payer: MEDICARE

## 2021-10-26 ENCOUNTER — TELEPHONE (OUTPATIENT)
Dept: INTERNAL MEDICINE CLINIC | Facility: CLINIC | Age: 86
End: 2021-10-26

## 2021-10-26 VITALS
RESPIRATION RATE: 20 BRPM | HEIGHT: 65 IN | SYSTOLIC BLOOD PRESSURE: 137 MMHG | HEART RATE: 89 BPM | BODY MASS INDEX: 21.1 KG/M2 | DIASTOLIC BLOOD PRESSURE: 79 MMHG | WEIGHT: 126.63 LBS | TEMPERATURE: 98 F

## 2021-10-26 DIAGNOSIS — K59.00 CONSTIPATION, UNSPECIFIED CONSTIPATION TYPE: ICD-10-CM

## 2021-10-26 DIAGNOSIS — J44.9 CHRONIC OBSTRUCTIVE PULMONARY DISEASE, UNSPECIFIED COPD TYPE (HCC): Primary | ICD-10-CM

## 2021-10-26 DIAGNOSIS — E78.2 MIXED HYPERLIPIDEMIA: ICD-10-CM

## 2021-10-26 PROCEDURE — 99214 OFFICE O/P EST MOD 30 MIN: CPT | Performed by: INTERNAL MEDICINE

## 2021-10-26 RX ORDER — ALBUTEROL SULFATE 90 UG/1
2 AEROSOL, METERED RESPIRATORY (INHALATION) EVERY 6 HOURS PRN
Qty: 18 G | Refills: 0 | Status: SHIPPED | OUTPATIENT
Start: 2021-10-26

## 2021-10-26 RX ORDER — ATORVASTATIN CALCIUM 10 MG/1
10 TABLET, FILM COATED ORAL DAILY
Qty: 90 TABLET | Refills: 1 | Status: SHIPPED | OUTPATIENT
Start: 2021-10-26

## 2021-10-26 NOTE — TELEPHONE ENCOUNTER
Patient request to have blood work results sent to her as well as the last ones to compare.  Send to [facility]

## 2021-10-26 NOTE — PATIENT INSTRUCTIONS
• Take Miralax once a day with 4 oz water or juice. • Take Colace (docusate) 100 mg twice daily. • Add fiber to your diet with All Bran cereal, prunes, or figs.   • If severe constipation, try Dulcolax (bisacodyl) suppositories only for occasional use   •

## 2021-10-26 NOTE — PROGRESS NOTES
HPI:    Patient ID: Jostin Ramirez is a 80year old female. HPI:  Pt c/o constipation for a long time. Pt c/o SOB. She uses the symbicort twice a day which helps, but her breathing is gradually worsening.   She     Past Surgical History:   Procedur Gastro-Intestinal Disorder Maternal Grandmother         TUBERCULOSIS   • Breast Cancer Maternal Aunt         80s   • Glaucoma Neg    • Diabetes Neg        Review of Systems   Respiratory: Positive for cough and shortness of breath. Negative for wheezing. (six) hours as needed (cough).

## 2021-10-27 NOTE — TELEPHONE ENCOUNTER
Printed off labs from this year and last year and placed in mail for patient, patient does not have mychart

## 2022-02-01 RX ORDER — BUDESONIDE AND FORMOTEROL FUMARATE DIHYDRATE 160; 4.5 UG/1; UG/1
AEROSOL RESPIRATORY (INHALATION)
Qty: 10.2 G | Refills: 5 | Status: SHIPPED | OUTPATIENT
Start: 2022-02-01

## 2022-02-03 ENCOUNTER — TELEPHONE (OUTPATIENT)
Dept: PULMONOLOGY | Facility: CLINIC | Age: 87
End: 2022-02-03

## 2022-02-03 NOTE — TELEPHONE ENCOUNTER
Received fax from [facility] for oxygen orders. Placed on Dr. Craig Fisher folder for review.

## 2022-08-14 ENCOUNTER — APPOINTMENT (OUTPATIENT)
Dept: CT IMAGING | Facility: HOSPITAL | Age: 87
End: 2022-08-14
Attending: EMERGENCY MEDICINE
Payer: MEDICARE

## 2022-08-14 ENCOUNTER — APPOINTMENT (OUTPATIENT)
Dept: GENERAL RADIOLOGY | Facility: HOSPITAL | Age: 87
End: 2022-08-14
Attending: EMERGENCY MEDICINE
Payer: MEDICARE

## 2022-08-14 ENCOUNTER — HOSPITAL ENCOUNTER (INPATIENT)
Facility: HOSPITAL | Age: 87
LOS: 3 days | Discharge: NURSING, REHAB OR LONG-TERM CARE | End: 2022-08-17
Attending: EMERGENCY MEDICINE | Admitting: HOSPITALIST
Payer: MEDICARE
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
## 2022-08-14 DIAGNOSIS — R77.8 ELEVATED TROPONIN I LEVEL: Primary | ICD-10-CM

## 2022-08-14 DIAGNOSIS — S22.079A CLOSED FRACTURE OF NINTH THORACIC VERTEBRA, UNSPECIFIED FRACTURE MORPHOLOGY, INITIAL ENCOUNTER (HCC): ICD-10-CM

## 2022-08-14 PROBLEM — R79.89 ELEVATED TROPONIN I LEVEL: Status: ACTIVE | Noted: 2022-08-14

## 2022-08-14 LAB
ANION GAP SERPL CALC-SCNC: 9 MMOL/L (ref 0–18)
BASOPHILS # BLD AUTO: 0.05 X10(3) UL (ref 0–0.2)
BASOPHILS NFR BLD AUTO: 0.3 %
BILIRUB UR QL CFM: NEGATIVE
BUN BLD-MCNC: 32 MG/DL (ref 7–18)
BUN/CREAT SERPL: 32.7 (ref 10–20)
CALCIUM BLD-MCNC: 9.6 MG/DL (ref 8.5–10.1)
CHLORIDE SERPL-SCNC: 109 MMOL/L (ref 98–112)
CHOLEST SERPL-MCNC: 187 MG/DL (ref ?–200)
CK SERPL-CCNC: 684 U/L
CLARITY UR: CLEAR
CO2 SERPL-SCNC: 25 MMOL/L (ref 21–32)
COLOR UR: YELLOW
CREAT BLD-MCNC: 0.98 MG/DL
DEPRECATED RDW RBC AUTO: 51.5 FL (ref 35.1–46.3)
EOSINOPHIL # BLD AUTO: 0 X10(3) UL (ref 0–0.7)
EOSINOPHIL NFR BLD AUTO: 0 %
ERYTHROCYTE [DISTWIDTH] IN BLOOD BY AUTOMATED COUNT: 14.1 % (ref 11–15)
GFR SERPLBLD BASED ON 1.73 SQ M-ARVRAT: 53 ML/MIN/1.73M2 (ref 60–?)
GLUCOSE BLD-MCNC: 126 MG/DL (ref 70–99)
GLUCOSE BLDC GLUCOMTR-MCNC: 115 MG/DL (ref 70–99)
GLUCOSE UR-MCNC: NEGATIVE MG/DL
HCT VFR BLD AUTO: 48.7 %
HDLC SERPL-MCNC: 78 MG/DL (ref 40–59)
HGB BLD-MCNC: 16.4 G/DL
IMM GRANULOCYTES # BLD AUTO: 0.05 X10(3) UL (ref 0–1)
IMM GRANULOCYTES NFR BLD: 0.3 %
KETONES UR-MCNC: 15 MG/DL
LDLC SERPL CALC-MCNC: 89 MG/DL (ref ?–100)
LEUKOCYTE ESTERASE UR QL STRIP.AUTO: NEGATIVE
LYMPHOCYTES # BLD AUTO: 0.73 X10(3) UL (ref 1–4)
LYMPHOCYTES NFR BLD AUTO: 5 %
MCH RBC QN AUTO: 33.6 PG (ref 26–34)
MCHC RBC AUTO-ENTMCNC: 33.7 G/DL (ref 31–37)
MCV RBC AUTO: 99.8 FL
MONOCYTES # BLD AUTO: 0.88 X10(3) UL (ref 0.1–1)
MONOCYTES NFR BLD AUTO: 6 %
NEUTROPHILS # BLD AUTO: 12.9 X10 (3) UL (ref 1.5–7.7)
NEUTROPHILS # BLD AUTO: 12.9 X10(3) UL (ref 1.5–7.7)
NEUTROPHILS NFR BLD AUTO: 88.4 %
NITRITE UR QL STRIP.AUTO: NEGATIVE
NONHDLC SERPL-MCNC: 109 MG/DL (ref ?–130)
OSMOLALITY SERPL CALC.SUM OF ELEC: 304 MOSM/KG (ref 275–295)
PH UR: 5 [PH] (ref 5–8)
PLATELET # BLD AUTO: 174 10(3)UL (ref 150–450)
POTASSIUM SERPL-SCNC: 4.2 MMOL/L (ref 3.5–5.1)
RBC # BLD AUTO: 4.88 X10(6)UL
SARS-COV-2 RNA RESP QL NAA+PROBE: NOT DETECTED
SODIUM SERPL-SCNC: 143 MMOL/L (ref 136–145)
SP GR UR STRIP: >=1.03 (ref 1–1.03)
TRIGL SERPL-MCNC: 116 MG/DL (ref 30–149)
TROPONIN I HIGH SENSITIVITY: 634 NG/L
TROPONIN I HIGH SENSITIVITY: 779 NG/L
TROPONIN I HIGH SENSITIVITY: 863 NG/L
UROBILINOGEN UR STRIP-ACNC: 0.2
VLDLC SERPL CALC-MCNC: 19 MG/DL (ref 0–30)
WBC # BLD AUTO: 14.6 X10(3) UL (ref 4–11)

## 2022-08-14 PROCEDURE — 80048 BASIC METABOLIC PNL TOTAL CA: CPT | Performed by: EMERGENCY MEDICINE

## 2022-08-14 PROCEDURE — 96361 HYDRATE IV INFUSION ADD-ON: CPT

## 2022-08-14 PROCEDURE — 93010 ELECTROCARDIOGRAM REPORT: CPT | Performed by: EMERGENCY MEDICINE

## 2022-08-14 PROCEDURE — 85025 COMPLETE CBC W/AUTO DIFF WBC: CPT | Performed by: EMERGENCY MEDICINE

## 2022-08-14 PROCEDURE — 72131 CT LUMBAR SPINE W/O DYE: CPT | Performed by: EMERGENCY MEDICINE

## 2022-08-14 PROCEDURE — 99285 EMERGENCY DEPT VISIT HI MDM: CPT

## 2022-08-14 PROCEDURE — 93005 ELECTROCARDIOGRAM TRACING: CPT

## 2022-08-14 PROCEDURE — 70450 CT HEAD/BRAIN W/O DYE: CPT | Performed by: EMERGENCY MEDICINE

## 2022-08-14 PROCEDURE — 82962 GLUCOSE BLOOD TEST: CPT

## 2022-08-14 PROCEDURE — 81001 URINALYSIS AUTO W/SCOPE: CPT | Performed by: EMERGENCY MEDICINE

## 2022-08-14 PROCEDURE — 72125 CT NECK SPINE W/O DYE: CPT | Performed by: EMERGENCY MEDICINE

## 2022-08-14 PROCEDURE — 72128 CT CHEST SPINE W/O DYE: CPT | Performed by: EMERGENCY MEDICINE

## 2022-08-14 PROCEDURE — 96360 HYDRATION IV INFUSION INIT: CPT

## 2022-08-14 PROCEDURE — 71045 X-RAY EXAM CHEST 1 VIEW: CPT | Performed by: EMERGENCY MEDICINE

## 2022-08-14 PROCEDURE — 72170 X-RAY EXAM OF PELVIS: CPT | Performed by: EMERGENCY MEDICINE

## 2022-08-14 PROCEDURE — 82550 ASSAY OF CK (CPK): CPT | Performed by: EMERGENCY MEDICINE

## 2022-08-14 PROCEDURE — 80061 LIPID PANEL: CPT | Performed by: EMERGENCY MEDICINE

## 2022-08-14 PROCEDURE — 81015 MICROSCOPIC EXAM OF URINE: CPT | Performed by: EMERGENCY MEDICINE

## 2022-08-14 PROCEDURE — 84484 ASSAY OF TROPONIN QUANT: CPT | Performed by: EMERGENCY MEDICINE

## 2022-08-14 PROCEDURE — 84484 ASSAY OF TROPONIN QUANT: CPT | Performed by: HOSPITALIST

## 2022-08-14 RX ORDER — MELATONIN
3 NIGHTLY PRN
Status: DISCONTINUED | OUTPATIENT
Start: 2022-08-14 | End: 2022-08-17

## 2022-08-14 RX ORDER — ASPIRIN 81 MG/1
81 TABLET, CHEWABLE ORAL DAILY
Status: DISCONTINUED | OUTPATIENT
Start: 2022-08-15 | End: 2022-08-17

## 2022-08-14 RX ORDER — ATORVASTATIN CALCIUM 10 MG/1
10 TABLET, FILM COATED ORAL DAILY
Status: DISCONTINUED | OUTPATIENT
Start: 2022-08-15 | End: 2022-08-17

## 2022-08-14 RX ORDER — POLYETHYLENE GLYCOL 3350 17 G/17G
17 POWDER, FOR SOLUTION ORAL DAILY PRN
Status: DISCONTINUED | OUTPATIENT
Start: 2022-08-14 | End: 2022-08-17

## 2022-08-14 RX ORDER — SENNOSIDES 8.6 MG
17.2 TABLET ORAL NIGHTLY PRN
Status: DISCONTINUED | OUTPATIENT
Start: 2022-08-14 | End: 2022-08-17

## 2022-08-14 RX ORDER — ACETAMINOPHEN 500 MG
500 TABLET ORAL EVERY 4 HOURS PRN
Status: DISCONTINUED | OUTPATIENT
Start: 2022-08-14 | End: 2022-08-17

## 2022-08-14 RX ORDER — ONDANSETRON 2 MG/ML
4 INJECTION INTRAMUSCULAR; INTRAVENOUS EVERY 6 HOURS PRN
Status: DISCONTINUED | OUTPATIENT
Start: 2022-08-14 | End: 2022-08-17

## 2022-08-14 RX ORDER — SODIUM CHLORIDE 9 MG/ML
INJECTION, SOLUTION INTRAVENOUS CONTINUOUS
Status: DISCONTINUED | OUTPATIENT
Start: 2022-08-14 | End: 2022-08-16

## 2022-08-14 RX ORDER — IPRATROPIUM BROMIDE AND ALBUTEROL SULFATE 2.5; .5 MG/3ML; MG/3ML
3 SOLUTION RESPIRATORY (INHALATION) EVERY 6 HOURS PRN
Status: DISCONTINUED | OUTPATIENT
Start: 2022-08-14 | End: 2022-08-17

## 2022-08-14 RX ORDER — FLUTICASONE FUROATE AND VILANTEROL 200; 25 UG/1; UG/1
1 POWDER RESPIRATORY (INHALATION) DAILY
Status: DISCONTINUED | OUTPATIENT
Start: 2022-08-15 | End: 2022-08-17

## 2022-08-14 NOTE — ED QUICK NOTES
Orders for admission, patient is aware of plan and ready to go upstairs. Any questions, please call ED RN Sapphire RN  at extension [phone].    Type of COVID test sent: Rapid  COVID Suspicion level: Negative    Titratable drug(s) infusing:  Rate:    LOC at time of transport:  A&Ox4    Other pertinent information    CIWA score=  NIH score=

## 2022-08-14 NOTE — ED INITIAL ASSESSMENT (HPI)
Pt arrives via ems from home s/p fall, unwitnessed, unknown downtime, niece called 911 after she came to the house and saw the patient on the ground. Patient is A&OX3, she remembers getting up to get tylenol, on what she believes was Friday, and stats she \"just fell\", denies having dizziness or chest pain prior to the fall, unsure if she hit her head or lost consciousness. Family reports that spoke to her on the phone yesterday at 1200. Pt also noted to be 83% on RA, states she is on \"optional 02\". Pt placed on 2L NC. On ASA, denies taking it daily, \"only when I feel like it\".

## 2022-08-15 ENCOUNTER — APPOINTMENT (OUTPATIENT)
Dept: CV DIAGNOSTICS | Facility: HOSPITAL | Age: 87
End: 2022-08-15
Attending: HOSPITALIST
Payer: MEDICARE

## 2022-08-15 LAB
ANION GAP SERPL CALC-SCNC: 10 MMOL/L (ref 0–18)
BASOPHILS # BLD AUTO: 0.05 X10(3) UL (ref 0–0.2)
BASOPHILS NFR BLD AUTO: 0.5 %
BUN BLD-MCNC: 30 MG/DL (ref 7–18)
BUN/CREAT SERPL: 50 (ref 10–20)
CALCIUM BLD-MCNC: 8.7 MG/DL (ref 8.5–10.1)
CHLORIDE SERPL-SCNC: 111 MMOL/L (ref 98–112)
CK SERPL-CCNC: 241 U/L
CO2 SERPL-SCNC: 22 MMOL/L (ref 21–32)
CREAT BLD-MCNC: 0.6 MG/DL
DEPRECATED RDW RBC AUTO: 49.1 FL (ref 35.1–46.3)
EOSINOPHIL # BLD AUTO: 0.17 X10(3) UL (ref 0–0.7)
EOSINOPHIL NFR BLD AUTO: 1.6 %
ERYTHROCYTE [DISTWIDTH] IN BLOOD BY AUTOMATED COUNT: 13.9 % (ref 11–15)
GFR SERPLBLD BASED ON 1.73 SQ M-ARVRAT: 83 ML/MIN/1.73M2 (ref 60–?)
GLUCOSE BLD-MCNC: 82 MG/DL (ref 70–99)
HCT VFR BLD AUTO: 43.2 %
HGB BLD-MCNC: 14.5 G/DL
IMM GRANULOCYTES # BLD AUTO: 0.03 X10(3) UL (ref 0–1)
IMM GRANULOCYTES NFR BLD: 0.3 %
LYMPHOCYTES # BLD AUTO: 1.08 X10(3) UL (ref 1–4)
LYMPHOCYTES NFR BLD AUTO: 10 %
MCH RBC QN AUTO: 32.5 PG (ref 26–34)
MCHC RBC AUTO-ENTMCNC: 33.6 G/DL (ref 31–37)
MCV RBC AUTO: 96.9 FL
MONOCYTES # BLD AUTO: 0.76 X10(3) UL (ref 0.1–1)
MONOCYTES NFR BLD AUTO: 7 %
NEUTROPHILS # BLD AUTO: 8.73 X10 (3) UL (ref 1.5–7.7)
NEUTROPHILS # BLD AUTO: 8.73 X10(3) UL (ref 1.5–7.7)
NEUTROPHILS NFR BLD AUTO: 80.6 %
OSMOLALITY SERPL CALC.SUM OF ELEC: 301 MOSM/KG (ref 275–295)
PLATELET # BLD AUTO: 149 10(3)UL (ref 150–450)
POTASSIUM SERPL-SCNC: 3.7 MMOL/L (ref 3.5–5.1)
RBC # BLD AUTO: 4.46 X10(6)UL
SODIUM SERPL-SCNC: 143 MMOL/L (ref 136–145)
WBC # BLD AUTO: 10.8 X10(3) UL (ref 4–11)

## 2022-08-15 PROCEDURE — 80048 BASIC METABOLIC PNL TOTAL CA: CPT | Performed by: HOSPITALIST

## 2022-08-15 PROCEDURE — 97530 THERAPEUTIC ACTIVITIES: CPT

## 2022-08-15 PROCEDURE — 97161 PT EVAL LOW COMPLEX 20 MIN: CPT

## 2022-08-15 PROCEDURE — 85025 COMPLETE CBC W/AUTO DIFF WBC: CPT | Performed by: HOSPITALIST

## 2022-08-15 PROCEDURE — 82550 ASSAY OF CK (CPK): CPT | Performed by: HOSPITALIST

## 2022-08-15 PROCEDURE — 93306 TTE W/DOPPLER COMPLETE: CPT | Performed by: HOSPITALIST

## 2022-08-15 PROCEDURE — 94640 AIRWAY INHALATION TREATMENT: CPT

## 2022-08-15 RX ORDER — POTASSIUM CHLORIDE 20 MEQ/1
40 TABLET, EXTENDED RELEASE ORAL ONCE
Status: COMPLETED | OUTPATIENT
Start: 2022-08-15 | End: 2022-08-15

## 2022-08-15 RX ORDER — HEPARIN SODIUM 5000 [USP'U]/ML
5000 INJECTION, SOLUTION INTRAVENOUS; SUBCUTANEOUS EVERY 12 HOURS SCHEDULED
Status: DISCONTINUED | OUTPATIENT
Start: 2022-08-15 | End: 2022-08-17

## 2022-08-15 NOTE — PLAN OF CARE
Pt received resting in bed, in no acute distress, requiring 2 L NC attempted to wean pt off oxygen 86% on RA, denies SOB. Had echo this am, plan is PT/OT eval. IV fluids. Fall/safety precautions in place, call light within pt reach, hourly rounding maintained. Problem: Patient Centered Care  Goal: Patient preferences are identified and integrated in the patient's plan of care  Description: Interventions:  - What would you like us to know as we care for you? Pt lives home alone. MELL is her niece, Rai Hooks.    - Provide timely, complete, and accurate information to patient/family  - Incorporate patient and family knowledge, values, beliefs, and cultural backgrounds into the planning and delivery of care  - Encourage patient/family to participate in care and decision-making at the level they choose  - Honor patient and family perspectives and choices  8/15/2022 1549 by Bibi Shanks RN  Outcome: Progressing  8/15/2022 1541 by Bibi Shanks RN  Outcome: Progressing     Problem: Patient/Family Goals  Goal: Patient/Family Long Term Goal  Description: Patient's Long Term Goal: Back to home and get stronger    Interventions:  - Need home supervision  - DAYANNA Benitez 78  - Follow up with PCP  - See additional Care Plan goals for specific interventions  8/15/2022 1549 by iBbi Shanks RN  Outcome: Progressing  8/15/2022 1541 by Bibi Shanks RN  Outcome: Progressing  Goal: Patient/Family Short Term Goal  Description: Patient's Short Term Goal: Get stronger    Interventions:   - IV fluids for dehydration  - wound care for skin tears  -Monitor VS   - pain management   - See additional Care Plan goals for specific interventions  8/15/2022 1549 by Bibi Shanks RN  Outcome: Progressing  8/15/2022 1541 by Bibi Shanks RN  Outcome: Progressing     Problem: RESPIRATORY - ADULT  Goal: Achieves optimal ventilation and oxygenation  Description: INTERVENTIONS:  - Assess for changes in respiratory status  - Assess for changes in mentation and behavior  - Position to facilitate oxygenation and minimize respiratory effort  - Oxygen supplementation based on oxygen saturation or ABGs  - Provide Smoking Cessation handout, if applicable  - Encourage broncho-pulmonary hygiene including cough, deep breathe, Incentive Spirometry  - Assess the need for suctioning and perform as needed  - Assess and instruct to report SOB or any respiratory difficulty  - Respiratory Therapy support as indicated  - Manage/alleviate anxiety  - Monitor for signs/symptoms of CO2 retention  8/15/2022 1549 by Tyra Judge RN  Outcome: Progressing  8/15/2022 1541 by Tyra Judge RN  Outcome: Progressing     Problem: GASTROINTESTINAL - ADULT  Goal: Maintains adequate nutritional intake (undernourished)  Description: INTERVENTIONS:  - Monitor percentage of each meal consumed  - Identify factors contributing to decreased intake, treat as appropriate  - Assist with meals as needed  - Monitor I&O, WT and lab values  - Obtain nutritional consult as needed  - Optimize oral hygiene and moisture  - Encourage food from home; allow for food preferences  - Enhance eating environment  8/15/2022 1549 by Tyra Judge RN  Outcome: Progressing  8/15/2022 1541 by Tyra Judge RN  Outcome: Progressing     Problem: SKIN/TISSUE INTEGRITY - ADULT  Goal: Skin integrity remains intact  Description: INTERVENTIONS  - Assess and document risk factors for pressure ulcer development  - Assess and document skin integrity  - Monitor for areas of redness and/or skin breakdown  - Initiate interventions, skin care algorithm/standards of care as needed  8/15/2022 1549 by Tyra Judge RN  Outcome: Progressing  8/15/2022 1541 by Tyra Judge RN  Outcome: Progressing  Goal: Incision(s), wounds(s) or drain site(s) healing without S/S of infection  Description: INTERVENTIONS:  - Assess and document risk factors for pressure ulcer development  - Assess and document skin integrity  - Assess and document dressing/incision, wound bed, drain sites and surrounding tissue  - Implement wound care per orders  - Initiate isolation precautions as appropriate  - Initiate Pressure Ulcer prevention bundle as indicated  8/15/2022 1549 by Arnlado Huizar RN  Outcome: Progressing  8/15/2022 1541 by Arnaldo Huizar RN  Outcome: Progressing     Problem: MUSCULOSKELETAL - ADULT  Goal: Return mobility to safest level of function  Description: INTERVENTIONS:  - Assess patient stability and activity tolerance for standing, transferring and ambulating w/ or w/o assistive devices  - Assist with transfers and ambulation using safe patient handling equipment as needed  - Ensure adequate protection for wounds/incisions during mobilization  - Obtain PT/OT consults as needed  - Advance activity as appropriate  - Communicate ordered activity level and limitations with patient/family  8/15/2022 1549 by Arnaldo Huizar RN  Outcome: Progressing  8/15/2022 1541 by Arnaldo Huizar RN  Outcome: Progressing     Problem: Impaired Activities of Daily Living  Goal: Achieve highest/safest level of independence in self care  Description: Interventions:  - Assess ability and encourage patient to participate in ADLs to maximize function  - Promote sitting position while performing ADLs such as feeding, grooming, and bathing  - Educate and encourage patient/family in tolerated functional activity level and precautions during self-care  - Encourage patient to incorporate impaired side during daily activities to promote function  8/15/2022 1549 by Arnaldo Huizar RN  Outcome: Progressing  8/15/2022 1541 by Arnaldo Huizar RN  Outcome: Progressing

## 2022-08-15 NOTE — PROGRESS NOTES
Cardiology (consult dictated)    Assessment:  1. Apparent mechanical fall although cannot be certain patient did not have dizziness or syncope has precipitant. 2.  Low increase in troponins, trending down. Patient denies chest pain. No acute EKG abnormalities to suggest ischemia. May be related to the strain of falling or Takotsubo cardiomyopathy. Less likely to be an ischemic event. No prior cardiac history. Plan:  1.  Echocardiogram.  If echo is normal, would ambulate patient. If patient able to ambulate without chest pain or other evidence of ischemia, would not pursue an ischemic work-up.     Thank you

## 2022-08-15 NOTE — OCCUPATIONAL THERAPY NOTE
OT orders received, chart reviewed for OT eval. Per cardiology, plan is for echocardiogram: \" If the echo is normal, would ambulate the patient. If patient able to ambulate without chest pain or other evidence for ischemia, would not pursue an ischemic workup. \" Will hold activity at this time per cardiology and re-attempt tomorrow.

## 2022-08-15 NOTE — CONSULTS
Casiimro Todd    PATIENT'S NAME: Ronak SUTTON   ATTENDING PHYSICIAN: Nicolás Barriga MD   CONSULTING PHYSICIAN: Severiano Don. Giorgio Campbell MD   PATIENT ACCOUNT#:   [id number]    LOCATION:  [facility] [place] [address] #:   [id number]       YOB: 1926  ADMISSION DATE:       08/14/2022      CONSULT DATE:  08/15/2022    REPORT OF CONSULTATION      HISTORY OF PRESENT ILLNESS:  The patient is a 80-year-old female who fell at home and was unable to get up. Her memory for the event is poor, so it is unclear whether she passed out or had dizziness or whether it was a mechanical fall. There is also some disparity regarding the history of the duration of patient being on the ground. We were called to assess the patient because of elevated troponins. The patient denies any prior cardiac history. Her health overall, in fact, has been excellent over the years. PAST MEDICAL HISTORY:  No major medical problems. PAST SURGICAL HISTORY:  Positive for tonsillectomy and appendectomy as well as cataract surgery. MEDICATIONS:  She is on only inhalers and simvastatin at home. SOCIAL HISTORY:  She is a former smoker but has quit. She does not drink alcohol. She is single and has no children. She lives independently. 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 FAMILY HISTORY:  Negative for premature coronary disease. REVIEW OF SYSTEMS:  Otherwise negative. PHYSICAL EXAMINATION:    GENERAL:  Well-developed, well-nourished female in no acute distress, alert and oriented x3. VITAL SIGNS:  Blood pressure 130/70; respirations 18; pulse 80, regular rhythm; afebrile. NECK:  Neck veins are normal.  LUNGS:  Clear anterolaterally. HEART:  S1, S2 normal.  No pathologic murmur. No S3. No rub. ABDOMEN:  Soft, nontender. EXTREMITIES:  Warm and dry, well perfused. LABORATORY DATA:  Troponin 863, then 779, then 634.       First EKG shows sinus tachycardia with a rate of 104 with occasional PACs; no acute ischemic changes. Second EKG is normal rhythm, again with no acute ischemic changes. ASSESSMENT:    1. Apparent mechanical fall though cannot be certain. Patient did not have dizziness or syncope. No apparent trauma. Prolonged time on floor, unable to get up. 2.   Low increase in troponins, trending down. No acute EKG changes to suggest ischemia. Patient denies chest pain or prior cardiac history. Elevated troponins could well be due to the prolonged time on the ground or could be part of a takotsubo-like cardiomyopathy. Less likely due to an acute coronary syndrome. PLAN:  Echocardiogram.  If the echo is normal, would ambulate the patient. If patient able to ambulate without chest pain or other evidence for ischemia, would not pursue an ischemic workup. Thank you for this consultation. Dictated By Alfredo Hendrix.  Kemi You MD  d: 08/15/2022 07:56:55  t: 08/15/2022 93:60:53  marcojohn [place] [id number]  DINESH/

## 2022-08-15 NOTE — PHYSICAL THERAPY NOTE
PT order received and chart was reviewed. Per cardiology consult by Dr. Gato Broussard on 8/15: \"If echo is normal, would ambulate patient. If patient able to ambulate without chest pain or other evidence of ischemia, would not pursue an ischemic work-up. \" Will await echocardiogram results prior to PT evaluation. Will follow up tomorrow as appropriate and able.

## 2022-08-15 NOTE — PLAN OF CARE
Pt received resting in bed, in no distress, up w/assist and walker, WTD dressing changed BID per order. Pt on IV abx,. Creatinine elevated, nephrology placed on consult, strict I&O per MD, fall/safety precautions in place, call light within pt reach, hourly rounding maintained. Problem: Patient Centered Care  Goal: Patient preferences are identified and integrated in the patient's plan of care  Description: Interventions:  - What would you like us to know as we care for you? Pt lives home alone. POLESLEY is her niece, Veto Fails.    - Provide timely, complete, and accurate information to patient/family  - Incorporate patient and family knowledge, values, beliefs, and cultural backgrounds into the planning and delivery of care  - Encourage patient/family to participate in care and decision-making at the level they choose  - Honor patient and family perspectives and choices  Outcome: Progressing     Problem: Patient/Family Goals  Goal: Patient/Family Long Term Goal  Description: Patient's Long Term Goal: Back to home and get stronger    Interventions:  - Need home supervision  - [facility]  - Follow up with PCP  - See additional Care Plan goals for specific interventions  Outcome: Progressing  Goal: Patient/Family Short Term Goal  Description: Patient's Short Term Goal: Get stronger    Interventions:   - IV fluids for dehydration  - wound care for skin tears  -Monitor VS   - pain management   - See additional Care Plan goals for specific interventions  Outcome: Progressing     Problem: RESPIRATORY - ADULT  Goal: Achieves optimal ventilation and oxygenation  Description: INTERVENTIONS:  - Assess for changes in respiratory status  - Assess for changes in mentation and behavior  - Position to facilitate oxygenation and minimize respiratory effort  - Oxygen supplementation based on oxygen saturation or ABGs  - Provide Smoking Cessation handout, if applicable  - Encourage broncho-pulmonary hygiene including cough, deep breathe, Incentive Spirometry  - Assess the need for suctioning and perform as needed  - Assess and instruct to report SOB or any respiratory difficulty  - Respiratory Therapy support as indicated  - Manage/alleviate anxiety  - Monitor for signs/symptoms of CO2 retention  Outcome: Progressing     Problem: GASTROINTESTINAL - ADULT  Goal: Maintains adequate nutritional intake (undernourished)  Description: INTERVENTIONS:  - Monitor percentage of each meal consumed  - Identify factors contributing to decreased intake, treat as appropriate  - Assist with meals as needed  - Monitor I&O, WT and lab values  - Obtain nutritional consult as needed  - Optimize oral hygiene and moisture  - Encourage food from home; allow for food preferences  - Enhance eating environment  Outcome: Progressing     Problem: SKIN/TISSUE INTEGRITY - ADULT  Goal: Skin integrity remains intact  Description: INTERVENTIONS  - Assess and document risk factors for pressure ulcer development  - Assess and document skin integrity  - Monitor for areas of redness and/or skin breakdown  - Initiate interventions, skin care algorithm/standards of care as needed  Outcome: Progressing  Goal: Incision(s), wounds(s) or drain site(s) healing without S/S of infection  Description: INTERVENTIONS:  - Assess and document risk factors for pressure ulcer development  - Assess and document skin integrity  - Assess and document dressing/incision, wound bed, drain sites and surrounding tissue  - Implement wound care per orders  - Initiate isolation precautions as appropriate  - Initiate Pressure Ulcer prevention bundle as indicated  Outcome: Progressing     Problem: MUSCULOSKELETAL - ADULT  Goal: Return mobility to safest level of function  Description: INTERVENTIONS:  - Assess patient stability and activity tolerance for standing, transferring and ambulating w/ or w/o assistive devices  - Assist with transfers and ambulation using safe patient handling equipment as needed  - Ensure adequate protection for wounds/incisions during mobilization  - Obtain PT/OT consults as needed  - Advance activity as appropriate  - Communicate ordered activity level and limitations with patient/family  Outcome: Progressing     Problem: Impaired Activities of Daily Living  Goal: Achieve highest/safest level of independence in self care  Description: Interventions:  - Assess ability and encourage patient to participate in ADLs to maximize function  - Promote sitting position while performing ADLs such as feeding, grooming, and bathing  - Educate and encourage patient/family in tolerated functional activity level and precautions during self-care  - Encourage patient to incorporate impaired side during daily activities to promote function  Outcome: Progressing

## 2022-08-15 NOTE — CM/SW NOTE
08/15/22 1500   CM/SW Referral Data   Referral Source Social Work (self-referral)   Reason for Referral Discharge planning   Informant Patient   Pertinent Medical Hx   Does patient have an established PCP? Yes  [place]  )   Patient Info   Patient's Current Mental Status at Time of Assessment Alert;Oriented   Patient's [address]   Number of Levels in Home 2  (remains on the main level )   Number of Stair in Home 5   Patient lives with Alone   Patient Status Prior to Admission   Independent with ADLs and Mobility Yes   Services in place prior to admission DME/Supplies at home   Type of DME/Supplies Straight Cane   Discharge Needs   Anticipated D/C needs To be determined     SW met with the pt at bedside to confirm the above information. The pt. Reports being independent and driving prior to admission. The pt. Does not have a hx. Of [facility] or rehab at this time. The pt. Is agreeable to either [facility] or rehab if it is recommended. PT/OT evals are pending at this time.      Catie Meyer, [facility] ext [phone]

## 2022-08-15 NOTE — ED QUICK NOTES
Pt endorsed at this time. Transportation at bedside. Pt being transported to unit in stable condition.

## 2022-08-15 NOTE — PLAN OF CARE
Pt is alert and oriented x3-4. Hard of hearing. Pt unable to recall how long she was laying on the floor. Pt denies any pain or discomfort. Skin tear noted on both elbows, and both sides cleaned and covered with mepilex dressings. Left hip bruising also noted. Dr. Tova Quan notified about home medications to be reconciled today due to pt can not recall what medications she is taking at home, and [place] [facility] was closed by the time patient was admitted. IV fluids infusing. Pt is afebrile. Purewick in place. Call light within reach. Plan for PT and OT eval, and 2D echo today. Problem: Patient Centered Care  Goal: Patient preferences are identified and integrated in the patient's plan of care  Description: Interventions:  - What would you like us to know as we care for you? Pt lives home alone. MELL is her niece, Nilsa Cotto.    - Provide timely, complete, and accurate information to patient/family  - Incorporate patient and family knowledge, values, beliefs, and cultural backgrounds into the planning and delivery of care  - Encourage patient/family to participate in care and decision-making at the level they choose  - Honor patient and family perspectives and choices  Outcome: Progressing     Problem: Patient/Family Goals  Goal: Patient/Family Long Term Goal  Description: Patient's Long Term Goal: Back to home and get stronger    Interventions:  - Need home supervision  - [facility]  - Follow up with PCP  - See additional Care Plan goals for specific interventions  Outcome: Progressing  Goal: Patient/Family Short Term Goal  Description: Patient's Short Term Goal: Get stronger    Interventions:   - IV fluids for dehydration  - wound care for skin tears  -Monitor VS   - pain management   - See additional Care Plan goals for specific interventions  Outcome: Progressing     Problem: RESPIRATORY - ADULT  Goal: Achieves optimal ventilation and oxygenation  Description: INTERVENTIONS:  - Assess for changes in respiratory status  - Assess for changes in mentation and behavior  - Position to facilitate oxygenation and minimize respiratory effort  - Oxygen supplementation based on oxygen saturation or ABGs  - Provide Smoking Cessation handout, if applicable  - Encourage broncho-pulmonary hygiene including cough, deep breathe, Incentive Spirometry  - Assess the need for suctioning and perform as needed  - Assess and instruct to report SOB or any respiratory difficulty  - Respiratory Therapy support as indicated  - Manage/alleviate anxiety  - Monitor for signs/symptoms of CO2 retention  Outcome: Progressing     Problem: GASTROINTESTINAL - ADULT  Goal: Maintains adequate nutritional intake (undernourished)  Description: INTERVENTIONS:  - Monitor percentage of each meal consumed  - Identify factors contributing to decreased intake, treat as appropriate  - Assist with meals as needed  - Monitor I&O, WT and lab values  - Obtain nutritional consult as needed  - Optimize oral hygiene and moisture  - Encourage food from home; allow for food preferences  - Enhance eating environment  Outcome: Progressing     Problem: SKIN/TISSUE INTEGRITY - ADULT  Goal: Skin integrity remains intact  Description: INTERVENTIONS  - Assess and document risk factors for pressure ulcer development  - Assess and document skin integrity  - Monitor for areas of redness and/or skin breakdown  - Initiate interventions, skin care algorithm/standards of care as needed  Outcome: Progressing  Goal: Incision(s), wounds(s) or drain site(s) healing without S/S of infection  Description: INTERVENTIONS:  - Assess and document risk factors for pressure ulcer development  - Assess and document skin integrity  - Assess and document dressing/incision, wound bed, drain sites and surrounding tissue  - Implement wound care per orders  - Initiate isolation precautions as appropriate  - Initiate Pressure Ulcer prevention bundle as indicated  Outcome: Progressing     Problem: MUSCULOSKELETAL - ADULT  Goal: Return mobility to safest level of function  Description: INTERVENTIONS:  - Assess patient stability and activity tolerance for standing, transferring and ambulating w/ or w/o assistive devices  - Assist with transfers and ambulation using safe patient handling equipment as needed  - Ensure adequate protection for wounds/incisions during mobilization  - Obtain PT/OT consults as needed  - Advance activity as appropriate  - Communicate ordered activity level and limitations with patient/family  Outcome: Progressing     Problem: Impaired Activities of Daily Living  Goal: Achieve highest/safest level of independence in self care  Description: Interventions:  - Assess ability and encourage patient to participate in ADLs to maximize function  - Promote sitting position while performing ADLs such as feeding, grooming, and bathing  - Educate and encourage patient/family in tolerated functional activity level and precautions during self-care    Outcome: Progressing

## 2022-08-16 ENCOUNTER — TELEPHONE (OUTPATIENT)
Dept: INTERNAL MEDICINE CLINIC | Facility: CLINIC | Age: 87
End: 2022-08-16

## 2022-08-16 LAB — POTASSIUM SERPL-SCNC: 3.7 MMOL/L (ref 3.5–5.1)

## 2022-08-16 PROCEDURE — 97166 OT EVAL MOD COMPLEX 45 MIN: CPT

## 2022-08-16 PROCEDURE — 84132 ASSAY OF SERUM POTASSIUM: CPT | Performed by: HOSPITALIST

## 2022-08-16 PROCEDURE — 97530 THERAPEUTIC ACTIVITIES: CPT

## 2022-08-16 RX ORDER — POTASSIUM CHLORIDE 20 MEQ/1
40 TABLET, EXTENDED RELEASE ORAL ONCE
Status: COMPLETED | OUTPATIENT
Start: 2022-08-16 | End: 2022-08-16

## 2022-08-16 NOTE — PLAN OF CARE
Pt is alert and oriented x3. Pt still unsure why she fell at home. Pt denies any discomfort or pain. Still on IV fluids. Purewick in place. Pt is able to turn side to side in bed. Call light within reach. Plan is [facility] with 24hr supervision vs. [facility] when medically cleared for discharge. Problem: Patient Centered Care  Goal: Patient preferences are identified and integrated in the patient's plan of care  Description: Interventions:  - What would you like us to know as we care for you? Pt lives home alone. POA is her niece, Kyle Valentine.    - Provide timely, complete, and accurate information to patient/family  - Incorporate patient and family knowledge, values, beliefs, and cultural backgrounds into the planning and delivery of care  - Encourage patient/family to participate in care and decision-making at the level they choose  - Honor patient and family perspectives and choices  Outcome: Progressing     Problem: Patient/Family Goals  Goal: Patient/Family Long Term Goal  Description: Patient's Long Term Goal: Back to home and get stronger    Interventions:  - Need home supervision  - [facility] vs [facility]  - Follow up with PCP  - See additional Care Plan goals for specific interventions  Outcome: Progressing  Goal: Patient/Family Short Term Goal  Description: Patient's Short Term Goal: Get stronger    Interventions:   - IV fluids for dehydration  - wound care for skin tears  -Monitor VS   - pain management   - See additional Care Plan goals for specific interventions  Outcome: Progressing     Problem: RESPIRATORY - ADULT  Goal: Achieves optimal ventilation and oxygenation  Description: INTERVENTIONS:  - Assess for changes in respiratory status  - Assess for changes in mentation and behavior  - Position to facilitate oxygenation and minimize respiratory effort  - Oxygen supplementation based on oxygen saturation or ABGs  - Provide Smoking Cessation handout, if applicable  - Encourage broncho-pulmonary hygiene including cough, deep breathe, Incentive Spirometry  - Assess the need for suctioning and perform as needed  - Assess and instruct to report SOB or any respiratory difficulty  - Respiratory Therapy support as indicated  - Manage/alleviate anxiety  - Monitor for signs/symptoms of CO2 retention  Outcome: Progressing     Problem: GASTROINTESTINAL - ADULT  Goal: Maintains adequate nutritional intake (undernourished)  Description: INTERVENTIONS:  - Monitor percentage of each meal consumed  - Identify factors contributing to decreased intake, treat as appropriate  - Assist with meals as needed  - Monitor I&O, WT and lab values  - Obtain nutritional consult as needed  - Optimize oral hygiene and moisture  - Encourage food from home; allow for food preferences  - Enhance eating environment  Outcome: Progressing     Problem: SKIN/TISSUE INTEGRITY - ADULT  Goal: Skin integrity remains intact  Description: INTERVENTIONS  - Assess and document risk factors for pressure ulcer development  - Assess and document skin integrity  - Monitor for areas of redness and/or skin breakdown  - Initiate interventions, skin care algorithm/standards of care as needed  Outcome: Progressing  Goal: Incision(s), wounds(s) or drain site(s) healing without S/S of infection  Description: INTERVENTIONS:  - Assess and document risk factors for pressure ulcer development  - Assess and document skin integrity  - Assess and document dressing/incision, wound bed, drain sites and surrounding tissue  - Implement wound care per orders  - Initiate isolation precautions as appropriate  - Initiate Pressure Ulcer prevention bundle as indicated  Outcome: Progressing     Problem: MUSCULOSKELETAL - ADULT  Goal: Return mobility to safest level of function  Description: INTERVENTIONS:  - Assess patient stability and activity tolerance for standing, transferring and ambulating w/ or w/o assistive devices  - Assist with transfers and ambulation using safe patient handling equipment as needed  - Ensure adequate protection for wounds/incisions during mobilization  - Obtain PT/OT consults as needed  - Advance activity as appropriate  - Communicate ordered activity level and limitations with patient/family  Outcome: Progressing     Problem: Impaired Activities of Daily Living  Goal: Achieve highest/safest level of independence in self care  Description: Interventions:  - Assess ability and encourage patient to participate in ADLs to maximize function  - Promote sitting position while performing ADLs such as feeding, grooming, and bathing  - Educate and encourage patient/family in tolerated functional activity level and precautions during self-care    Outcome: Progressing

## 2022-08-16 NOTE — TELEPHONE ENCOUNTER
Nurse is returning the drs call. Pt is currently admitted at [address] [place] [address], so the nurse would like to know if  will sign off on Home Health Orders?

## 2022-08-16 NOTE — TELEPHONE ENCOUNTER
Left message for Smitha/[facility] to call office back, please transfer call to ext. [phone] when she calls office back.

## 2022-08-16 NOTE — TELEPHONE ENCOUNTER
Ryan Bruno contacted office and informed that Dr Danny Rashid will sign orders, voiced understanding.

## 2022-08-16 NOTE — CM/SW NOTE
Department  notified of request for [facility] referrals started. Assigned CM/SW to follow up with pt/family on further discharge planning.      Carolyn Leon   August 16, 2022   08:15

## 2022-08-16 NOTE — TELEPHONE ENCOUNTER
I was paged directly from [facility]. [phone]. Please call and ask what they need and why I was paged directly rather than a message sent through the office.

## 2022-08-16 NOTE — PLAN OF CARE
Pt received in no acute distress, reported pain/discomfort w/ambulation, pt reporting pain as tolerable. On 1-2L NC, plan is [facility] vs. [facility] w/24 hr supervision, awaiting patient's choice and medical clearance. Fall/safety precautions in place, call light within pt reach, hourly rounding maintained. Problem: Patient Centered Care  Goal: Patient preferences are identified and integrated in the patient's plan of care  Description: Interventions:  - What would you like us to know as we care for you? Pt lives home alone. POA is her niece, Pamela Manley.    - Provide timely, complete, and accurate information to patient/family  - Incorporate patient and family knowledge, values, beliefs, and cultural backgrounds into the planning and delivery of care  - Encourage patient/family to participate in care and decision-making at the level they choose  - Honor patient and family perspectives and choices  Outcome: Progressing     Problem: Patient/Family Goals  Goal: Patient/Family Long Term Goal  Description: Patient's Long Term Goal: Back to home and get stronger    Interventions:  - Need home supervision  - [facility] vs [facility]  - Follow up with PCP  - See additional Care Plan goals for specific interventions  Outcome: Progressing  Goal: Patient/Family Short Term Goal  Description: Patient's Short Term Goal: Get stronger    Interventions:   - IV fluids for dehydration  - wound care for skin tears  -Monitor VS   - pain management   - See additional Care Plan goals for specific interventions  Outcome: Progressing     Problem: RESPIRATORY - ADULT  Goal: Achieves optimal ventilation and oxygenation  Description: INTERVENTIONS:  - Assess for changes in respiratory status  - Assess for changes in mentation and behavior  - Position to facilitate oxygenation and minimize respiratory effort  - Oxygen supplementation based on oxygen saturation or ABGs  - Provide Smoking Cessation handout, if applicable  - Encourage broncho-pulmonary hygiene including cough, deep breathe, Incentive Spirometry  - Assess the need for suctioning and perform as needed  - Assess and instruct to report SOB or any respiratory difficulty  - Respiratory Therapy support as indicated  - Manage/alleviate anxiety  - Monitor for signs/symptoms of CO2 retention  Outcome: Progressing     Problem: GASTROINTESTINAL - ADULT  Goal: Maintains adequate nutritional intake (undernourished)  Description: INTERVENTIONS:  - Monitor percentage of each meal consumed  - Identify factors contributing to decreased intake, treat as appropriate  - Assist with meals as needed  - Monitor I&O, WT and lab values  - Obtain nutritional consult as needed  - Optimize oral hygiene and moisture  - Encourage food from home; allow for food preferences  - Enhance eating environment  Outcome: Progressing     Problem: SKIN/TISSUE INTEGRITY - ADULT  Goal: Skin integrity remains intact  Description: INTERVENTIONS  - Assess and document risk factors for pressure ulcer development  - Assess and document skin integrity  - Monitor for areas of redness and/or skin breakdown  - Initiate interventions, skin care algorithm/standards of care as needed  Outcome: Progressing  Goal: Incision(s), wounds(s) or drain site(s) healing without S/S of infection  Description: INTERVENTIONS:  - Assess and document risk factors for pressure ulcer development  - Assess and document skin integrity  - Assess and document dressing/incision, wound bed, drain sites and surrounding tissue  - Implement wound care per orders  - Initiate isolation precautions as appropriate  - Initiate Pressure Ulcer prevention bundle as indicated  Outcome: Progressing     Problem: MUSCULOSKELETAL - ADULT  Goal: Return mobility to safest level of function  Description: INTERVENTIONS:  - Assess patient stability and activity tolerance for standing, transferring and ambulating w/ or w/o assistive devices  - Assist with transfers and ambulation using safe patient handling equipment as needed  - Ensure adequate protection for wounds/incisions during mobilization  - Obtain PT/OT consults as needed  - Advance activity as appropriate  - Communicate ordered activity level and limitations with patient/family  Outcome: Progressing     Problem: Impaired Activities of Daily Living  Goal: Achieve highest/safest level of independence in self care  Description: Interventions:  - Assess ability and encourage patient to participate in ADLs to maximize function  - Promote sitting position while performing ADLs such as feeding, grooming, and bathing  - Educate and encourage patient/family in tolerated functional activity level and precautions during self-care  - Provide support under elbow of weak side to prevent shoulder subluxation  - Encourage patient to incorporate impaired side during daily activities to promote function  Outcome: Progressing

## 2022-08-16 NOTE — CM/SW NOTE
10: 25AM  Per chart review, PT recommendation is for [facility] vs home w/ HH and 24hr care/supervision. SW met w/ pt in her room to discuss. SW provided [facility] list for review. SW explained PT recommendation. SW provided pt w/ list of [facility] agencies as well. Pt confirmed to review the [facility] list and consider rehab vs home w/ HH and 24hr Care/supervision. PASRR level 1 completed - no level 2 required. Document uploaded to [employer]. SW then received notice from [employer] stating pt was interested in [facility]. SW met w/ pt again in her room. Pt confirmed Cr as choice but she is still deciding between rehab and Home w/ HH. SW explained that [facility] will be reserved and SW will f/up closer to DC for final decision. Pt is agreeable to this plan. Pt confirmed having PRN home O2. Pt unsure of company. From pt's description, it sounds like pt has an [employer] machine as well as a concentrator at home. Pt believes it was arranged by Dr. Adamaris Lundy office approx 1 yr ago. SW consulted w/ Carrillo Abbott from [facility] - confirmed pt has 2L O2 continuous w/ their company. SW confirmed pt does in fact have an [employer] machine too.    02:20PM  Received notice from RN/Jes - pt is agreeable to Cr at [facility]. SW to f/up w/ pt again Wednesday 8/17 to confirm no change in plans. Rapid COVID test will be needed day of DC. PLAN: Cr - pending med clear & rapid COVID    SW/CM to remain available for support and/or discharge planning.          Rosina Chavez, MSW, [address] [facility]

## 2022-08-17 ENCOUNTER — SNF VISIT (OUTPATIENT)
Dept: INTERNAL MEDICINE CLINIC | Facility: SKILLED NURSING FACILITY | Age: 87
End: 2022-08-17

## 2022-08-17 VITALS
HEART RATE: 80 BPM | RESPIRATION RATE: 16 BRPM | BODY MASS INDEX: 20 KG/M2 | SYSTOLIC BLOOD PRESSURE: 133 MMHG | WEIGHT: 121.81 LBS | DIASTOLIC BLOOD PRESSURE: 67 MMHG | OXYGEN SATURATION: 93 % | TEMPERATURE: 98 F

## 2022-08-17 DIAGNOSIS — S22.070D COMPRESSION FRACTURE OF T9 VERTEBRA WITH ROUTINE HEALING, SUBSEQUENT ENCOUNTER: ICD-10-CM

## 2022-08-17 DIAGNOSIS — J44.9 CHRONIC OBSTRUCTIVE PULMONARY DISEASE, UNSPECIFIED COPD TYPE (HCC): ICD-10-CM

## 2022-08-17 DIAGNOSIS — R53.1 WEAKNESS: ICD-10-CM

## 2022-08-17 LAB
POTASSIUM SERPL-SCNC: 3.8 MMOL/L (ref 3.5–5.1)
SARS-COV-2 RNA RESP QL NAA+PROBE: NOT DETECTED

## 2022-08-17 PROCEDURE — 84132 ASSAY OF SERUM POTASSIUM: CPT | Performed by: HOSPITALIST

## 2022-08-17 PROCEDURE — 1111F DSCHRG MED/CURRENT MED MERGE: CPT | Performed by: NURSE PRACTITIONER

## 2022-08-17 PROCEDURE — 99308 SBSQ NF CARE LOW MDM 20: CPT | Performed by: NURSE PRACTITIONER

## 2022-08-17 RX ORDER — HEPARIN SODIUM 5000 [USP'U]/ML
5000 INJECTION, SOLUTION INTRAVENOUS; SUBCUTANEOUS EVERY 12 HOURS SCHEDULED
Refills: 0 | Status: SHIPPED | COMMUNITY
Start: 2022-08-17 | End: 2022-08-31

## 2022-08-17 RX ORDER — POLYETHYLENE GLYCOL 3350 17 G/17G
17 POWDER, FOR SOLUTION ORAL DAILY PRN
Refills: 0 | Status: SHIPPED | COMMUNITY
Start: 2022-08-17

## 2022-08-17 RX ORDER — MELATONIN
Qty: 30 TABLET | Refills: 0 | Status: SHIPPED | COMMUNITY
Start: 2022-08-17

## 2022-08-17 RX ORDER — ACETAMINOPHEN 500 MG
500 TABLET ORAL EVERY 4 HOURS PRN
Refills: 0 | Status: SHIPPED | COMMUNITY
Start: 2022-08-17

## 2022-08-17 NOTE — PLAN OF CARE
Up with 1 assist to the bathroom with thoracic fracture precaution maintained on mobility. Pt did not complain of back pain overnight. Prompt incontinent care done. BM x 1. Call light within reach and calls appropriately. Nonskid socks in place. Bed in lowest locked position. Problem: Patient Centered Care  Goal: Patient preferences are identified and integrated in the patient's plan of care  Description: Interventions:  - What would you like us to know as we care for you? Pt lives home alone. POA is her niece, Tara Samuel.    - Provide timely, complete, and accurate information to patient/family  - Incorporate patient and family knowledge, values, beliefs, and cultural backgrounds into the planning and delivery of care  - Encourage patient/family to participate in care and decision-making at the level they choose  - Honor patient and family perspectives and choices  Outcome: Progressing     Problem: Patient/Family Goals  Goal: Patient/Family Long Term Goal  Description: Patient's Long Term Goal: Back to home and get stronger    Interventions:  - Going to [facility] Rx place on DC  - Follow up with PCP  - See additional Care Plan goals for specific interventions  Outcome: Progressing  Goal: Patient/Family Short Term Goal  Description: Patient's Short Term Goal: Get stronger    Interventions:   - IV fluids for dehydration  - wound care for skin tears  -Monitor VS   - pain management   - See additional Care Plan goals for specific interventions  Outcome: Progressing     Problem: RESPIRATORY - ADULT  Goal: Achieves optimal ventilation and oxygenation  Description: INTERVENTIONS:  - Assess for changes in respiratory status  - Assess for changes in mentation and behavior  - Position to facilitate oxygenation and minimize respiratory effort  - Oxygen supplementation based on oxygen saturation or ABGs  - Provide Smoking Cessation handout, if applicable  - Encourage broncho-pulmonary hygiene including cough, deep breathe, Incentive Spirometry  - Assess the need for suctioning and perform as needed  - Assess and instruct to report SOB or any respiratory difficulty  - Respiratory Therapy support as indicated  - Manage/alleviate anxiety  - Monitor for signs/symptoms of CO2 retention  Outcome: Progressing     Problem: GASTROINTESTINAL - ADULT  Goal: Maintains adequate nutritional intake (undernourished)  Description: INTERVENTIONS:  - Monitor percentage of each meal consumed  - Identify factors contributing to decreased intake, treat as appropriate  - Assist with meals as needed  - Monitor I&O, WT and lab values  - Obtain nutritional consult as needed  - Optimize oral hygiene and moisture  - Encourage food from home; allow for food preferences  - Enhance eating environment  Outcome: Progressing     Problem: SKIN/TISSUE INTEGRITY - ADULT  Goal: Skin integrity remains intact  Description: INTERVENTIONS  - Assess and document risk factors for pressure ulcer development  - Assess and document skin integrity  - Monitor for areas of redness and/or skin breakdown  - Initiate interventions, skin care algorithm/standards of care as needed  Outcome: Progressing  Goal: Incision(s), wounds(s) or drain site(s) healing without S/S of infection  Description: INTERVENTIONS:  - Assess and document risk factors for pressure ulcer development  - Assess and document skin integrity  - Assess and document dressing/incision, wound bed, drain sites and surrounding tissue  - Implement wound care per orders  - Initiate isolation precautions as appropriate  - Initiate Pressure Ulcer prevention bundle as indicated  Outcome: Progressing     Problem: MUSCULOSKELETAL - ADULT  Goal: Return mobility to safest level of function  Description: INTERVENTIONS:  - Assess patient stability and activity tolerance for standing, transferring and ambulating w/ or w/o assistive devices  - Assist with transfers and ambulation using safe patient handling equipment as needed  - Ensure adequate protection for wounds/incisions during mobilization  - Obtain PT/OT consults as needed  - Advance activity as appropriate  - Communicate ordered activity level and limitations with patient/family  Outcome: Progressing     Problem: Impaired Activities of Daily Living  Goal: Achieve highest/safest level of independence in self care  Description: Interventions:  - Assess ability and encourage patient to participate in ADLs to maximize function  - Promote sitting position while performing ADLs such as feeding, grooming, and bathing  - Educate and encourage patient/family in tolerated functional activity level and precautions during self-care  - Encourage patient to incorporate impaired side during daily activities to promote function  Outcome: Progressing

## 2022-08-17 NOTE — CM/SW NOTE
Per RN rounds, pt likely medically cleared for DC today. SW confirmed w/ liaison Abida Winston - bed available today if pt is ready. SW spoke to [facility] w/ [facility] (O2) set on WILL CALL for 8/17 and 8/18. PCS completed and will need date added day of actual DC. Rapid COVID test required prior to DC to [facility]. PLAN: [facility], Ambulance on WILL CALL, PCS completed (needs date) - pending med clear & rapid COVID      SW/CM to remain available for support and/or discharge planning.        Odette Galarza, MSW, [address] [facility]

## 2022-08-17 NOTE — CM/SW NOTE
08/17/22 0916   Discharge disposition   Expected discharge disposition [facility]   Discharge transportation [facility]     Per chart review, pt has DC order for today. Per request of [facility], AMBER confirmed w/ Dr. Rajvi Don - neb treatments have been stopped. SW consulted w/ [facility] - they can accept today at approx 12:30PM.    AMBER updated pt's RN/Ty and DC RN Alejandra Mcmanus. AMBER also updated pt in her room - pt is agreeable to DC plans and time. Per pt's request, AMBER contacted pt's [facility] via phone ([phone]). All questions addressed and answered.     AMBER spoke to [facility] w/ [facility] ([facility]) set for 12:30PM. PCS completed in [facility] - pt's RN to print w/ pt's AVS.    Report phone #: [phone]    PLAN: [address] [place] [address], Ambulance set for 12:30PM, PCS completed        SARY Leos, [address] [facility]

## 2022-08-17 NOTE — PLAN OF CARE
Report given to AYDEN Sosa. Problem: Patient Centered Care  Goal: Patient preferences are identified and integrated in the patient's plan of care  Description: Interventions:  - What would you like us to know as we care for you? Pt lives home alone. POA is her niece, Lay Tomlin.    - Provide timely, complete, and accurate information to patient/family  - Incorporate patient and family knowledge, values, beliefs, and cultural backgrounds into the planning and delivery of care  - Encourage patient/family to participate in care and decision-making at the level they choose  - Honor patient and family perspectives and choices  Outcome: Completed     Problem: Patient/Family Goals  Goal: Patient/Family Long Term Goal  Description: Patient's Long Term Goal: Back to home and get stronger    Interventions:  - Going to [facility] Rx place on DC  - Follow up with PCP  - See additional Care Plan goals for specific interventions  Outcome: Completed  Goal: Patient/Family Short Term Goal  Description: Patient's Short Term Goal: Get stronger    Interventions:   - IV fluids for dehydration  - wound care for skin tears  -Monitor VS   - pain management   - See additional Care Plan goals for specific interventions  Outcome: Completed     Problem: RESPIRATORY - ADULT  Goal: Achieves optimal ventilation and oxygenation  Description: INTERVENTIONS:  - Assess for changes in respiratory status  - Assess for changes in mentation and behavior  - Position to facilitate oxygenation and minimize respiratory effort  - Oxygen supplementation based on oxygen saturation or ABGs  - Provide Smoking Cessation handout, if applicable  - Encourage broncho-pulmonary hygiene including cough, deep breathe, Incentive Spirometry  - Assess the need for suctioning and perform as needed  - Assess and instruct to report SOB or any respiratory difficulty  - Respiratory Therapy support as indicated  - Manage/alleviate anxiety  - Monitor for signs/symptoms of CO2 retention  Outcome: Completed     Problem: GASTROINTESTINAL - ADULT  Goal: Maintains adequate nutritional intake (undernourished)  Description: INTERVENTIONS:  - Monitor percentage of each meal consumed  - Identify factors contributing to decreased intake, treat as appropriate  - Assist with meals as needed  - Monitor I&O, WT and lab values  - Obtain nutritional consult as needed  - Optimize oral hygiene and moisture  - Encourage food from home; allow for food preferences  - Enhance eating environment  Outcome: Completed     Problem: SKIN/TISSUE INTEGRITY - ADULT  Goal: Skin integrity remains intact  Description: INTERVENTIONS  - Assess and document risk factors for pressure ulcer development  - Assess and document skin integrity  - Monitor for areas of redness and/or skin breakdown  - Initiate interventions, skin care algorithm/standards of care as needed  Outcome: Completed  Goal: Incision(s), wounds(s) or drain site(s) healing without S/S of infection  Description: INTERVENTIONS:  - Assess and document risk factors for pressure ulcer development  - Assess and document skin integrity  - Assess and document dressing/incision, wound bed, drain sites and surrounding tissue  - Implement wound care per orders  - Initiate isolation precautions as appropriate  - Initiate Pressure Ulcer prevention bundle as indicated  Outcome: Completed     Problem: MUSCULOSKELETAL - ADULT  Goal: Return mobility to safest level of function  Description: INTERVENTIONS:  - Assess patient stability and activity tolerance for standing, transferring and ambulating w/ or w/o assistive devices  - Assist with transfers and ambulation using safe patient handling equipment as needed  - Ensure adequate protection for wounds/incisions during mobilization  - Obtain PT/OT consults as needed  - Advance activity as appropriate  - Communicate ordered activity level and limitations with patient/family  Outcome: Completed     Problem: Impaired Activities of Daily Living  Goal: Achieve highest/safest level of independence in self care  Description: Interventions:  - Assess ability and encourage patient to participate in ADLs to maximize function  - Promote sitting position while performing ADLs such as feeding, grooming, and bathing  - Educate and encourage patient/family in tolerated functional activity level and precautions during self-care    Outcome: Completed

## 2022-08-19 PROCEDURE — 99306 1ST NF CARE HIGH MDM 50: CPT | Performed by: INTERNAL MEDICINE

## 2022-08-19 PROCEDURE — 1111F DSCHRG MED/CURRENT MED MERGE: CPT | Performed by: INTERNAL MEDICINE

## 2022-08-22 ENCOUNTER — EXTERNAL FACILITY (OUTPATIENT)
Dept: PULMONOLOGY | Facility: CLINIC | Age: 87
End: 2022-08-22

## 2022-08-22 DIAGNOSIS — J43.2 CENTRILOBULAR EMPHYSEMA (HCC): Primary | ICD-10-CM

## 2022-08-22 PROCEDURE — 99305 1ST NF CARE MODERATE MDM 35: CPT | Performed by: PHYSICIAN ASSISTANT

## 2022-08-22 PROCEDURE — 99308 SBSQ NF CARE LOW MDM 20: CPT | Performed by: INTERNAL MEDICINE

## 2022-08-22 PROCEDURE — 1111F DSCHRG MED/CURRENT MED MERGE: CPT | Performed by: INTERNAL MEDICINE

## 2022-08-22 PROCEDURE — 1111F DSCHRG MED/CURRENT MED MERGE: CPT | Performed by: PHYSICIAN ASSISTANT

## 2022-08-24 PROCEDURE — 99308 SBSQ NF CARE LOW MDM 20: CPT | Performed by: INTERNAL MEDICINE

## 2022-08-24 PROCEDURE — 1111F DSCHRG MED/CURRENT MED MERGE: CPT | Performed by: INTERNAL MEDICINE

## 2022-08-26 PROCEDURE — 1111F DSCHRG MED/CURRENT MED MERGE: CPT | Performed by: INTERNAL MEDICINE

## 2022-08-26 PROCEDURE — 99308 SBSQ NF CARE LOW MDM 20: CPT | Performed by: INTERNAL MEDICINE

## 2022-08-28 ENCOUNTER — EXTERNAL FACILITY (OUTPATIENT)
Dept: INTERNAL MEDICINE CLINIC | Facility: CLINIC | Age: 87
End: 2022-08-28

## 2022-08-28 DIAGNOSIS — J43.2 CENTRILOBULAR EMPHYSEMA (HCC): ICD-10-CM

## 2022-08-28 DIAGNOSIS — R77.8 ELEVATED TROPONIN I LEVEL: ICD-10-CM

## 2022-08-28 DIAGNOSIS — E78.2 MIXED HYPERLIPIDEMIA: ICD-10-CM

## 2022-08-28 DIAGNOSIS — M19.90 OSTEOARTHRITIS, UNSPECIFIED OSTEOARTHRITIS TYPE, UNSPECIFIED SITE: ICD-10-CM

## 2022-08-28 DIAGNOSIS — M54.31 SCIATICA OF RIGHT SIDE: ICD-10-CM

## 2022-08-28 DIAGNOSIS — S22.079A CLOSED FRACTURE OF NINTH THORACIC VERTEBRA, UNSPECIFIED FRACTURE MORPHOLOGY, INITIAL ENCOUNTER (HCC): ICD-10-CM

## 2022-08-29 ENCOUNTER — EXTERNAL FACILITY (OUTPATIENT)
Dept: INTERNAL MEDICINE CLINIC | Facility: CLINIC | Age: 87
End: 2022-08-29

## 2022-08-29 ENCOUNTER — EXTERNAL FACILITY (OUTPATIENT)
Dept: PULMONOLOGY | Facility: CLINIC | Age: 87
End: 2022-08-29

## 2022-08-29 DIAGNOSIS — K59.00 CONSTIPATION, UNSPECIFIED CONSTIPATION TYPE: ICD-10-CM
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
## 2022-08-29 DIAGNOSIS — J43.2 CENTRILOBULAR EMPHYSEMA (HCC): Primary | ICD-10-CM

## 2022-08-29 DIAGNOSIS — M19.90 OSTEOARTHRITIS, UNSPECIFIED OSTEOARTHRITIS TYPE, UNSPECIFIED SITE: ICD-10-CM

## 2022-08-29 DIAGNOSIS — H35.3131 EARLY DRY STAGE NONEXUDATIVE AGE-RELATED MACULAR DEGENERATION OF BOTH EYES: ICD-10-CM

## 2022-08-29 DIAGNOSIS — J43.2 CENTRILOBULAR EMPHYSEMA (HCC): ICD-10-CM

## 2022-08-29 DIAGNOSIS — S22.079A CLOSED FRACTURE OF NINTH THORACIC VERTEBRA, UNSPECIFIED FRACTURE MORPHOLOGY, INITIAL ENCOUNTER (HCC): ICD-10-CM

## 2022-08-29 DIAGNOSIS — R77.8 ELEVATED TROPONIN I LEVEL: ICD-10-CM

## 2022-08-29 DIAGNOSIS — E78.2 MIXED HYPERLIPIDEMIA: ICD-10-CM

## 2022-08-29 PROCEDURE — 99307 SBSQ NF CARE SF MDM 10: CPT | Performed by: PHYSICIAN ASSISTANT

## 2022-08-29 PROCEDURE — 1111F DSCHRG MED/CURRENT MED MERGE: CPT | Performed by: PHYSICIAN ASSISTANT

## 2022-09-02 ENCOUNTER — TELEPHONE (OUTPATIENT)
Facility: CLINIC | Age: 87
End: 2022-09-02

## 2022-09-02 NOTE — TELEPHONE ENCOUNTER
Please ask the home health nurse to have pt schedule an office visit with me. I can only sign the papers if I see the pt .

## 2022-09-02 NOTE — TELEPHONE ENCOUNTER
[facility] health received home health orders. Would like to know if Dr. Scotty Stone will sign home health orders and follow up with patient. Please advise.

## 2022-09-04 ENCOUNTER — EXTERNAL FACILITY (OUTPATIENT)
Dept: INTERNAL MEDICINE CLINIC | Facility: CLINIC | Age: 87
End: 2022-09-04

## 2022-09-04 DIAGNOSIS — M19.90 OSTEOARTHRITIS, UNSPECIFIED OSTEOARTHRITIS TYPE, UNSPECIFIED SITE: ICD-10-CM

## 2022-09-04 DIAGNOSIS — J43.2 CENTRILOBULAR EMPHYSEMA (HCC): ICD-10-CM

## 2022-09-04 DIAGNOSIS — R35.0 URINARY FREQUENCY: ICD-10-CM

## 2022-09-04 DIAGNOSIS — K59.00 CONSTIPATION, UNSPECIFIED CONSTIPATION TYPE: ICD-10-CM

## 2022-09-04 DIAGNOSIS — S22.079A CLOSED FRACTURE OF NINTH THORACIC VERTEBRA, UNSPECIFIED FRACTURE MORPHOLOGY, INITIAL ENCOUNTER (HCC): ICD-10-CM

## 2022-09-04 DIAGNOSIS — R53.1 GENERAL WEAKNESS: ICD-10-CM

## 2022-09-06 NOTE — TELEPHONE ENCOUNTER
Called phone number that was placed as a call back. Got the answering service and they have requested me to call back in about 20 min cause Linda González is not answering the calls as or now, and would not know who to direct the call to. I verbalized understanding and will attempt to call again later.

## 2022-09-06 NOTE — TELEPHONE ENCOUNTER
Tried calling phone number again. Did not leave a voicemail cause recording did not reference [place].

## 2022-09-08 NOTE — TELEPHONE ENCOUNTER
S/w Jennifer from [address] [place] [address]. Informed her of MD's message that patient will need to be seen in the office before she can sign orders. She verbalized understanding and will inform the nurse to contact pt.

## 2022-09-21 ENCOUNTER — MED REC SCAN ONLY (OUTPATIENT)
Dept: INTERNAL MEDICINE CLINIC | Facility: CLINIC | Age: 87
End: 2022-09-21

## 2022-09-26 DIAGNOSIS — E78.2 MIXED HYPERLIPIDEMIA: ICD-10-CM

## 2022-09-26 RX ORDER — ATORVASTATIN CALCIUM 10 MG/1
10 TABLET, FILM COATED ORAL DAILY
Qty: 90 TABLET | Refills: 1 | Status: SHIPPED | OUTPATIENT
Start: 2022-09-26 | End: 2023-06-28

## 2022-09-26 NOTE — TELEPHONE ENCOUNTER
Refill passed per [place] [employer] protocol.     Requested Prescriptions   Pending Prescriptions Disp Refills    ATORVASTATIN 10 MG Oral Tab [Pharmacy Med Name: Atorvastatin Calcium 10 Mg Tab Nort] 90 tablet 0     Sig: TAKE ONE TABLET BY MOUTH ONE TIME DAILY        Cholesterol Medication Protocol Passed - 9/26/2022  1:30 AM        Passed - ALT in past 12 months        Passed - LDL in past 12 months        Passed - Last ALT < 80       Lab Results   Component Value Date    ALT 34 08/19/2022             Passed - Last LDL < 130     Lab Results   Component Value Date    LDL 89 08/14/2022               Passed - In person appointment or virtual visit in the past 12 mos or appointment in next 3 mos       Recent Outpatient Visits              11 months ago Chronic obstructive pulmonary disease, unspecified COPD type [facility])    Amrik Canseco MD    Office Visit    1 year ago Chronic obstructive pulmonary disease, unspecified COPD type [facility])    Elinor [address] [place], [place] [address]Chetan MD    Office Visit    1 year ago Tessa Teran annual wellness visit, subsequent    Amrik Canseco MD    Office Visit    1 year ago Chronic obstructive pulmonary disease, unspecified COPD type [facility])    Zenaida Aldrich MD    Office Visit    1 year ago Chronic obstructive pulmonary disease, unspecified COPD type [facility])    Amrik Canseco MD    Office Visit                       Recent Outpatient Visits              11 months ago Chronic obstructive pulmonary disease, unspecified COPD type [facility])    Amrik Canseco MD    Office Visit    1 year ago Chronic obstructive pulmonary disease, unspecified COPD type [facility])    Elinor [facility], Sonia Beth MD    Office Visit    1 year ago Medicare annual wellness visit, subsequent    Drew Azul MD    Office Visit    1 year ago Chronic obstructive pulmonary disease, unspecified COPD type [facility])    Honora Galeazzi, Hugo Salters, MD    Office Visit    1 year ago Chronic obstructive pulmonary disease, unspecified COPD type [facility])    [facility], [facility], [address], Mp Mae MD    Office Visit

## 2022-10-06 ENCOUNTER — TELEPHONE (OUTPATIENT)
Dept: PULMONOLOGY | Facility: CLINIC | Age: 87
End: 2022-10-06

## 2022-10-06 NOTE — TELEPHONE ENCOUNTER
Spoke to Home Health agent who is requesting Dr. Rosa Elena Mendosa sign home health orders for patient. States she needs office visit with PCP but cannot get there. Spoke to patient who gave consent for me to speak with Elias who will drive her to appointment with PCP. Spoke to Meir Hammond who agreed to take her to appointment that is scheduled for tomorrow. Nothing further needed from [facility].

## 2022-10-07 ENCOUNTER — OFFICE VISIT (OUTPATIENT)
Dept: INTERNAL MEDICINE CLINIC | Facility: CLINIC | Age: 87
End: 2022-10-07
Payer: MEDICARE

## 2022-10-07 VITALS
BODY MASS INDEX: 18.83 KG/M2 | DIASTOLIC BLOOD PRESSURE: 86 MMHG | HEIGHT: 65 IN | SYSTOLIC BLOOD PRESSURE: 130 MMHG | HEART RATE: 78 BPM | WEIGHT: 113 LBS
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
## 2022-10-07 PROCEDURE — 1126F AMNT PAIN NOTED NONE PRSNT: CPT | Performed by: NURSE PRACTITIONER

## 2022-10-07 PROCEDURE — 99213 OFFICE O/P EST LOW 20 MIN: CPT | Performed by: NURSE PRACTITIONER

## 2022-10-30 ENCOUNTER — TELEPHONE (OUTPATIENT)
Dept: INTERNAL MEDICINE CLINIC | Facility: CLINIC | Age: 87
End: 2022-10-30

## 2022-10-30 NOTE — TELEPHONE ENCOUNTER
There has been a miscommunication regarding billing for home health certification or re-certification. Please review home health documentation and submit billing for these dates, if it has not been done previously. These home health documents can be found in the media section of the chart:    9/8/2022 through 11/6/2022.

## 2023-03-22 ENCOUNTER — HOSPITAL ENCOUNTER (EMERGENCY)
Facility: HOSPITAL | Age: 88
Discharge: HOME | End: 2023-03-22
Attending: EMERGENCY MEDICINE
Payer: MEDICARE

## 2023-03-22 ENCOUNTER — APPOINTMENT (OUTPATIENT)
Dept: GENERAL RADIOLOGY | Facility: HOSPITAL | Age: 88
End: 2023-03-22
Attending: EMERGENCY MEDICINE
Payer: MEDICARE

## 2023-03-22 VITALS
SYSTOLIC BLOOD PRESSURE: 146 MMHG | WEIGHT: 125 LBS | DIASTOLIC BLOOD PRESSURE: 99 MMHG | RESPIRATION RATE: 19 BRPM | TEMPERATURE: 97 F | BODY MASS INDEX: 20.83 KG/M2 | HEIGHT: 65 IN | OXYGEN SATURATION: 90 % | HEART RATE: 91 BPM

## 2023-03-22 DIAGNOSIS — N30.00 ACUTE CYSTITIS WITHOUT HEMATURIA: ICD-10-CM
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
## 2023-03-22 DIAGNOSIS — S70.01XA CONTUSION OF RIGHT HIP, INITIAL ENCOUNTER: ICD-10-CM

## 2023-03-22 LAB
ANION GAP SERPL CALC-SCNC: 9 MMOL/L (ref 0–18)
BASOPHILS # BLD AUTO: 0.03 X10(3) UL (ref 0–0.2)
BASOPHILS NFR BLD AUTO: 0.3 %
BILIRUB UR QL: NEGATIVE
BUN BLD-MCNC: 20 MG/DL (ref 7–18)
BUN/CREAT SERPL: 26.7 (ref 10–20)
CALCIUM BLD-MCNC: 9.3 MG/DL (ref 8.5–10.1)
CHLORIDE SERPL-SCNC: 108 MMOL/L (ref 98–112)
CK SERPL-CCNC: 267 U/L
CO2 SERPL-SCNC: 27 MMOL/L (ref 21–32)
COLOR UR: YELLOW
CREAT BLD-MCNC: 0.75 MG/DL
DEPRECATED RDW RBC AUTO: 48.9 FL (ref 35.1–46.3)
EOSINOPHIL # BLD AUTO: 0.02 X10(3) UL (ref 0–0.7)
EOSINOPHIL NFR BLD AUTO: 0.2 %
ERYTHROCYTE [DISTWIDTH] IN BLOOD BY AUTOMATED COUNT: 13.5 % (ref 11–15)
GFR SERPLBLD BASED ON 1.73 SQ M-ARVRAT: 73 ML/MIN/1.73M2 (ref 60–?)
GLUCOSE BLD-MCNC: 105 MG/DL (ref 70–99)
GLUCOSE UR-MCNC: NEGATIVE MG/DL
HCT VFR BLD AUTO: 47 %
HGB BLD-MCNC: 15.6 G/DL
HGB UR QL STRIP.AUTO: NEGATIVE
HYALINE CASTS #/AREA URNS AUTO: PRESENT /LPF
IMM GRANULOCYTES # BLD AUTO: 0.04 X10(3) UL (ref 0–1)
IMM GRANULOCYTES NFR BLD: 0.4 %
KETONES UR-MCNC: 20 MG/DL
LYMPHOCYTES # BLD AUTO: 0.59 X10(3) UL (ref 1–4)
LYMPHOCYTES NFR BLD AUTO: 5.9 %
MCH RBC QN AUTO: 32.4 PG (ref 26–34)
MCHC RBC AUTO-ENTMCNC: 33.2 G/DL (ref 31–37)
MCV RBC AUTO: 97.5 FL
MONOCYTES # BLD AUTO: 0.75 X10(3) UL (ref 0.1–1)
MONOCYTES NFR BLD AUTO: 7.6 %
NEUTROPHILS # BLD AUTO: 8.49 X10 (3) UL (ref 1.5–7.7)
NEUTROPHILS # BLD AUTO: 8.49 X10(3) UL (ref 1.5–7.7)
NEUTROPHILS NFR BLD AUTO: 85.6 %
NITRITE UR QL STRIP.AUTO: POSITIVE
OSMOLALITY SERPL CALC.SUM OF ELEC: 301 MOSM/KG (ref 275–295)
PH UR: 5 [PH] (ref 5–8)
PLATELET # BLD AUTO: 171 10(3)UL (ref 150–450)
POTASSIUM SERPL-SCNC: 4.3 MMOL/L (ref 3.5–5.1)
PROT UR-MCNC: 30 MG/DL
RBC # BLD AUTO: 4.82 X10(6)UL
SARS-COV-2 RNA RESP QL NAA+PROBE: NOT DETECTED
SODIUM SERPL-SCNC: 144 MMOL/L (ref 136–145)
SP GR UR STRIP: 1.02 (ref 1–1.03)
UROBILINOGEN UR STRIP-ACNC: <2
VIT C UR-MCNC: 40 MG/DL
WBC # BLD AUTO: 9.9 X10(3) UL (ref 4–11)

## 2023-03-22 PROCEDURE — 99285 EMERGENCY DEPT VISIT HI MDM: CPT

## 2023-03-22 PROCEDURE — 80048 BASIC METABOLIC PNL TOTAL CA: CPT | Performed by: EMERGENCY MEDICINE

## 2023-03-22 PROCEDURE — 87186 SC STD MICRODIL/AGAR DIL: CPT | Performed by: EMERGENCY MEDICINE

## 2023-03-22 PROCEDURE — 81001 URINALYSIS AUTO W/SCOPE: CPT | Performed by: EMERGENCY MEDICINE

## 2023-03-22 PROCEDURE — 85025 COMPLETE CBC W/AUTO DIFF WBC: CPT | Performed by: EMERGENCY MEDICINE

## 2023-03-22 PROCEDURE — 87077 CULTURE AEROBIC IDENTIFY: CPT | Performed by: EMERGENCY MEDICINE

## 2023-03-22 PROCEDURE — 96360 HYDRATION IV INFUSION INIT: CPT

## 2023-03-22 PROCEDURE — 87086 URINE CULTURE/COLONY COUNT: CPT | Performed by: EMERGENCY MEDICINE

## 2023-03-22 PROCEDURE — 82550 ASSAY OF CK (CPK): CPT | Performed by: EMERGENCY MEDICINE

## 2023-03-22 PROCEDURE — 73502 X-RAY EXAM HIP UNI 2-3 VIEWS: CPT | Performed by: EMERGENCY MEDICINE

## 2023-03-22 PROCEDURE — 96361 HYDRATE IV INFUSION ADD-ON: CPT

## 2023-03-22 PROCEDURE — 71045 X-RAY EXAM CHEST 1 VIEW: CPT | Performed by: EMERGENCY MEDICINE

## 2023-03-22 RX ORDER — CEPHALEXIN 500 MG/1
500 CAPSULE ORAL 2 TIMES DAILY
Qty: 10 CAPSULE | Refills: 0 | Status: SHIPPED | OUTPATIENT
Start: 2023-03-22 | End: 2023-03-27

## 2023-03-22 RX ORDER — CEPHALEXIN 500 MG/1
500 CAPSULE ORAL ONCE
Status: COMPLETED | OUTPATIENT
Start: 2023-03-22 | End: 2023-03-22

## 2023-03-22 NOTE — ED QUICK NOTES
Pt states she feels good to go home, offered to provide list of services to receive home health/caregiver resources but patient declined. Pt assisted to wheelchair and niece as ride home.

## 2023-03-22 NOTE — ED INITIAL ASSESSMENT (HPI)
Pt to ED via EMS after leg gave out and she fell to ground at [address] [facility] yesterday and has been stuck on ground until she was able to call for help today. R hip/buttock pain tenderness. Denies hitting head/ no neck or back pain. AXOX3. No obvious deformity. Hard of hearing. Takes aspirin.

## 2023-03-22 NOTE — CM/SW NOTE
Paulina Cheatham is at the bedside with the pt. They decided to go to rehab for a short period of time. They chose [address] [place] [address].     I have reserved them in [place]. Waiting for room number and nurse number.

## 2023-03-24 ENCOUNTER — SNF VISIT (OUTPATIENT)
Dept: INTERNAL MEDICINE CLINIC | Facility: SKILLED NURSING FACILITY | Age: 88
End: 2023-03-24

## 2023-03-24 DIAGNOSIS — N30.00 ACUTE CYSTITIS WITHOUT HEMATURIA: ICD-10-CM

## 2023-03-24 DIAGNOSIS — M19.90 OSTEOARTHRITIS, UNSPECIFIED OSTEOARTHRITIS TYPE, UNSPECIFIED SITE: ICD-10-CM

## 2023-03-24 DIAGNOSIS — E78.2 MIXED HYPERLIPIDEMIA: ICD-10-CM

## 2023-03-24 DIAGNOSIS — J44.9 CHRONIC OBSTRUCTIVE PULMONARY DISEASE, UNSPECIFIED COPD TYPE (HCC): ICD-10-CM

## 2023-03-24 DIAGNOSIS — R53.81 PHYSICAL DECONDITIONING: ICD-10-CM

## 2023-03-27 ENCOUNTER — SNF VISIT (OUTPATIENT)
Dept: INTERNAL MEDICINE CLINIC | Facility: SKILLED NURSING FACILITY | Age: 88
End: 2023-03-27

## 2023-03-27 VITALS
DIASTOLIC BLOOD PRESSURE: 62 MMHG | OXYGEN SATURATION: 97 % | TEMPERATURE: 97 F | WEIGHT: 124.38 LBS | SYSTOLIC BLOOD PRESSURE: 117 MMHG | HEART RATE: 76 BPM | RESPIRATION RATE: 18 BRPM | BODY MASS INDEX: 21 KG/M2
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
## 2023-03-27 DIAGNOSIS — R06.02 SOB (SHORTNESS OF BREATH): ICD-10-CM

## 2023-03-27 DIAGNOSIS — M25.561 ACUTE PAIN OF RIGHT KNEE: ICD-10-CM

## 2023-03-27 DIAGNOSIS — J43.2 CENTRILOBULAR EMPHYSEMA (HCC): ICD-10-CM

## 2023-03-27 DIAGNOSIS — Z91.81 AT MODERATE RISK FOR FALL: ICD-10-CM

## 2023-03-27 DIAGNOSIS — R53.1 GENERALIZED WEAKNESS: ICD-10-CM

## 2023-03-28 ENCOUNTER — EXTERNAL FACILITY (OUTPATIENT)
Dept: INTERNAL MEDICINE CLINIC | Facility: CLINIC | Age: 88
End: 2023-03-28

## 2023-03-28 DIAGNOSIS — N30.00 ACUTE CYSTITIS WITHOUT HEMATURIA: ICD-10-CM

## 2023-03-28 DIAGNOSIS — E78.5 HYPERLIPIDEMIA, UNSPECIFIED HYPERLIPIDEMIA TYPE: ICD-10-CM

## 2023-03-28 DIAGNOSIS — K59.00 CONSTIPATION, UNSPECIFIED CONSTIPATION TYPE: ICD-10-CM

## 2023-03-28 PROCEDURE — 99305 1ST NF CARE MODERATE MDM 35: CPT | Performed by: INTERNAL MEDICINE

## 2023-03-29 ENCOUNTER — EXTERNAL FACILITY (OUTPATIENT)
Dept: PULMONOLOGY | Facility: CLINIC | Age: 88
End: 2023-03-29

## 2023-03-29 ENCOUNTER — SNF VISIT (OUTPATIENT)
Dept: INTERNAL MEDICINE CLINIC | Facility: SKILLED NURSING FACILITY | Age: 88
End: 2023-03-29

## 2023-03-29 VITALS
OXYGEN SATURATION: 94 % | BODY MASS INDEX: 21 KG/M2 | SYSTOLIC BLOOD PRESSURE: 108 MMHG | TEMPERATURE: 97 F | WEIGHT: 124.69 LBS | RESPIRATION RATE: 18 BRPM | DIASTOLIC BLOOD PRESSURE: 81 MMHG

## 2023-03-29 DIAGNOSIS — J43.2 CENTRILOBULAR EMPHYSEMA (HCC): ICD-10-CM

## 2023-03-29 DIAGNOSIS — S22.079A CLOSED FRACTURE OF NINTH THORACIC VERTEBRA, UNSPECIFIED FRACTURE MORPHOLOGY, INITIAL ENCOUNTER (HCC): ICD-10-CM

## 2023-03-29 DIAGNOSIS — J43.2 CENTRILOBULAR EMPHYSEMA (HCC): Primary | ICD-10-CM

## 2023-03-29 DIAGNOSIS — K59.00 CONSTIPATION, UNSPECIFIED CONSTIPATION TYPE: ICD-10-CM

## 2023-03-29 DIAGNOSIS — Z91.81 AT MODERATE RISK FOR FALL: ICD-10-CM

## 2023-03-29 DIAGNOSIS — R53.1 GENERALIZED WEAKNESS: ICD-10-CM

## 2023-03-29 PROCEDURE — 99309 SBSQ NF CARE MODERATE MDM 30: CPT | Performed by: NURSE PRACTITIONER

## 2023-03-29 PROCEDURE — 1126F AMNT PAIN NOTED NONE PRSNT: CPT | Performed by: NURSE PRACTITIONER

## 2023-03-29 PROCEDURE — 99304 1ST NF CARE SF/LOW MDM 25: CPT | Performed by: PHYSICIAN ASSISTANT

## 2023-03-30 ENCOUNTER — EXTERNAL FACILITY (OUTPATIENT)
Dept: INTERNAL MEDICINE CLINIC | Facility: CLINIC | Age: 88
End: 2023-03-30

## 2023-03-30 DIAGNOSIS — J42 CHRONIC BRONCHITIS, UNSPECIFIED CHRONIC BRONCHITIS TYPE (HCC): ICD-10-CM

## 2023-03-30 DIAGNOSIS — N39.0 URINARY TRACT INFECTION WITHOUT HEMATURIA, SITE UNSPECIFIED: ICD-10-CM

## 2023-03-30 DIAGNOSIS — I87.2 VENOUS INSUFFICIENCY: ICD-10-CM

## 2023-03-30 PROCEDURE — 99308 SBSQ NF CARE LOW MDM 20: CPT | Performed by: INTERNAL MEDICINE

## 2023-04-03 ENCOUNTER — SNF VISIT (OUTPATIENT)
Dept: INTERNAL MEDICINE CLINIC | Facility: SKILLED NURSING FACILITY | Age: 88
End: 2023-04-03

## 2023-04-03 DIAGNOSIS — E78.2 MIXED HYPERLIPIDEMIA: ICD-10-CM

## 2023-04-03 DIAGNOSIS — M79.89 RIGHT LEG SWELLING: ICD-10-CM

## 2023-04-03 DIAGNOSIS — N30.00 ACUTE CYSTITIS WITHOUT HEMATURIA: ICD-10-CM

## 2023-04-03 DIAGNOSIS — M19.90 OSTEOARTHRITIS, UNSPECIFIED OSTEOARTHRITIS TYPE, UNSPECIFIED SITE: ICD-10-CM

## 2023-04-03 DIAGNOSIS — J44.9 CHRONIC OBSTRUCTIVE PULMONARY DISEASE, UNSPECIFIED COPD TYPE (HCC): ICD-10-CM

## 2023-04-03 DIAGNOSIS — R53.81 PHYSICAL DECONDITIONING: ICD-10-CM

## 2023-04-04 ENCOUNTER — EXTERNAL FACILITY (OUTPATIENT)
Dept: INTERNAL MEDICINE CLINIC | Facility: CLINIC | Age: 88
End: 2023-04-04

## 2023-04-04 DIAGNOSIS — J42 CHRONIC BRONCHITIS, UNSPECIFIED CHRONIC BRONCHITIS TYPE (HCC): ICD-10-CM

## 2023-04-04 DIAGNOSIS — G47.00 INSOMNIA, UNSPECIFIED TYPE: ICD-10-CM
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
## 2023-04-04 PROCEDURE — 99308 SBSQ NF CARE LOW MDM 20: CPT | Performed by: INTERNAL MEDICINE

## 2023-04-05 ENCOUNTER — SNF VISIT (OUTPATIENT)
Dept: INTERNAL MEDICINE CLINIC | Facility: SKILLED NURSING FACILITY | Age: 88
End: 2023-04-05

## 2023-04-05 VITALS
BODY MASS INDEX: 21 KG/M2 | SYSTOLIC BLOOD PRESSURE: 118 MMHG | RESPIRATION RATE: 18 BRPM | WEIGHT: 124.63 LBS | TEMPERATURE: 98 F | DIASTOLIC BLOOD PRESSURE: 69 MMHG | HEART RATE: 69 BPM | OXYGEN SATURATION: 94 %

## 2023-04-05 DIAGNOSIS — J42 CHRONIC BRONCHITIS, UNSPECIFIED CHRONIC BRONCHITIS TYPE (HCC): ICD-10-CM

## 2023-04-05 DIAGNOSIS — M79.604 RIGHT LEG PAIN: ICD-10-CM

## 2023-04-05 DIAGNOSIS — Z91.81 AT MODERATE RISK FOR FALL: ICD-10-CM

## 2023-04-05 DIAGNOSIS — K59.00 CONSTIPATION, UNSPECIFIED CONSTIPATION TYPE: ICD-10-CM

## 2023-04-05 DIAGNOSIS — S22.079A CLOSED FRACTURE OF NINTH THORACIC VERTEBRA, UNSPECIFIED FRACTURE MORPHOLOGY, INITIAL ENCOUNTER (HCC): ICD-10-CM

## 2023-04-05 DIAGNOSIS — R53.1 GENERALIZED WEAKNESS: ICD-10-CM

## 2023-04-05 PROCEDURE — 1126F AMNT PAIN NOTED NONE PRSNT: CPT | Performed by: NURSE PRACTITIONER

## 2023-04-05 PROCEDURE — 99309 SBSQ NF CARE MODERATE MDM 30: CPT | Performed by: NURSE PRACTITIONER

## 2023-04-06 ENCOUNTER — EXTERNAL FACILITY (OUTPATIENT)
Dept: INTERNAL MEDICINE CLINIC | Facility: CLINIC | Age: 88
End: 2023-04-06

## 2023-04-06 DIAGNOSIS — E78.2 MIXED HYPERLIPIDEMIA: ICD-10-CM

## 2023-04-06 DIAGNOSIS — J42 CHRONIC BRONCHITIS, UNSPECIFIED CHRONIC BRONCHITIS TYPE (HCC): ICD-10-CM

## 2023-04-06 PROCEDURE — 99308 SBSQ NF CARE LOW MDM 20: CPT | Performed by: INTERNAL MEDICINE

## 2023-04-12 ENCOUNTER — SNF VISIT (OUTPATIENT)
Dept: INTERNAL MEDICINE CLINIC | Facility: SKILLED NURSING FACILITY | Age: 88
End: 2023-04-12

## 2023-04-12 ENCOUNTER — EXTERNAL FACILITY (OUTPATIENT)
Dept: PULMONOLOGY | Facility: CLINIC | Age: 88
End: 2023-04-12

## 2023-04-12 DIAGNOSIS — M19.90 OSTEOARTHRITIS, UNSPECIFIED OSTEOARTHRITIS TYPE, UNSPECIFIED SITE: ICD-10-CM

## 2023-04-12 DIAGNOSIS — M79.89 RIGHT LEG SWELLING: ICD-10-CM

## 2023-04-12 DIAGNOSIS — R09.02 HYPOXIA: ICD-10-CM

## 2023-04-12 DIAGNOSIS — G47.00 INSOMNIA, UNSPECIFIED TYPE: ICD-10-CM

## 2023-04-12 DIAGNOSIS — J44.9 CHRONIC OBSTRUCTIVE PULMONARY DISEASE, UNSPECIFIED COPD TYPE (HCC): ICD-10-CM

## 2023-04-12 DIAGNOSIS — E78.2 MIXED HYPERLIPIDEMIA: ICD-10-CM

## 2023-04-12 DIAGNOSIS — R09.02 OXYGEN DESATURATION: ICD-10-CM

## 2023-04-12 DIAGNOSIS — N30.00 ACUTE CYSTITIS WITHOUT HEMATURIA: ICD-10-CM

## 2023-04-12 DIAGNOSIS — J43.2 CENTRILOBULAR EMPHYSEMA (HCC): Primary | ICD-10-CM

## 2023-04-12 DIAGNOSIS — R53.81 PHYSICAL DECONDITIONING: ICD-10-CM

## 2023-04-12 PROCEDURE — 99309 SBSQ NF CARE MODERATE MDM 30: CPT | Performed by: NURSE PRACTITIONER

## 2023-04-12 PROCEDURE — 99308 SBSQ NF CARE LOW MDM 20: CPT | Performed by: PHYSICIAN ASSISTANT

## 2023-04-14 ENCOUNTER — SNF VISIT (OUTPATIENT)
Dept: INTERNAL MEDICINE CLINIC | Facility: SKILLED NURSING FACILITY | Age: 88
End: 2023-04-14

## 2023-04-14 ENCOUNTER — TELEPHONE (OUTPATIENT)
Dept: INTERNAL MEDICINE CLINIC | Facility: CLINIC | Age: 88
End: 2023-04-14

## 2023-04-14 DIAGNOSIS — R53.81 PHYSICAL DECONDITIONING: ICD-10-CM

## 2023-04-14 DIAGNOSIS — M19.90 OSTEOARTHRITIS, UNSPECIFIED OSTEOARTHRITIS TYPE, UNSPECIFIED SITE: ICD-10-CM

## 2023-04-14 DIAGNOSIS — J44.9 CHRONIC OBSTRUCTIVE PULMONARY DISEASE, UNSPECIFIED COPD TYPE (HCC): ICD-10-CM

## 2023-04-14 DIAGNOSIS — R09.02 OXYGEN DESATURATION: ICD-10-CM

## 2023-04-14 DIAGNOSIS — N30.00 ACUTE CYSTITIS WITHOUT HEMATURIA: ICD-10-CM

## 2023-04-14 DIAGNOSIS — R60.0 LEG EDEMA, RIGHT: ICD-10-CM

## 2023-04-14 DIAGNOSIS — E78.2 MIXED HYPERLIPIDEMIA: ICD-10-CM

## 2023-04-14 PROCEDURE — 99310 SBSQ NF CARE HIGH MDM 45: CPT | Performed by: NURSE PRACTITIONER

## 2023-04-14 NOTE — TELEPHONE ENCOUNTER
Received call from [facility] visiting nurse. Patient resides in a care facility. 82%-90% on RA. Lowers with activity, returns to 90% at rest.  Patient has hx of COPD and has orders for O2 which she has not been wearing. BP [id number] auto 142/106 manual, typically runs 150-160/80-90, lowest 122/64. Patient is asymptomatic. I advised Crystal to place O2 and recheck sat, patient was taken by staff for meal.  She will recheck. ER for O2 sat under 90% or any symptoms of chest pain, shortness of breath, dizziness, lightheadedness, headache or blurred vision. She verbalized understanding. Routed to visiting physician.

## 2023-04-17 ENCOUNTER — SNF VISIT (OUTPATIENT)
Dept: INTERNAL MEDICINE CLINIC | Facility: SKILLED NURSING FACILITY | Age: 88
End: 2023-04-17

## 2023-04-17 DIAGNOSIS — N30.00 ACUTE CYSTITIS WITHOUT HEMATURIA: ICD-10-CM

## 2023-04-17 DIAGNOSIS — R60.0 LEG EDEMA, RIGHT: ICD-10-CM

## 2023-04-17 DIAGNOSIS — J42 CHRONIC BRONCHITIS, UNSPECIFIED CHRONIC BRONCHITIS TYPE (HCC): ICD-10-CM

## 2023-04-17 DIAGNOSIS — R53.81 PHYSICAL DECONDITIONING: ICD-10-CM

## 2023-04-17 DIAGNOSIS — R35.0 URINARY FREQUENCY: ICD-10-CM

## 2023-04-17 DIAGNOSIS — E78.2 MIXED HYPERLIPIDEMIA: ICD-10-CM

## 2023-04-17 DIAGNOSIS — M19.90 OSTEOARTHRITIS, UNSPECIFIED OSTEOARTHRITIS TYPE, UNSPECIFIED SITE: ICD-10-CM

## 2023-04-17 PROCEDURE — 99309 SBSQ NF CARE MODERATE MDM 30: CPT | Performed by: NURSE PRACTITIONER

## 2023-04-18 ENCOUNTER — EXTERNAL FACILITY (OUTPATIENT)
Dept: INTERNAL MEDICINE CLINIC | Facility: CLINIC | Age: 88
End: 2023-04-18

## 2023-04-18 DIAGNOSIS — J42 CHRONIC BRONCHITIS, UNSPECIFIED CHRONIC BRONCHITIS TYPE (HCC): ICD-10-CM

## 2023-04-18 DIAGNOSIS — E78.2 MIXED HYPERLIPIDEMIA: ICD-10-CM

## 2023-04-18 PROCEDURE — 99308 SBSQ NF CARE LOW MDM 20: CPT | Performed by: INTERNAL MEDICINE

## 2023-04-19 ENCOUNTER — EXTERNAL FACILITY (OUTPATIENT)
Dept: PULMONOLOGY | Facility: CLINIC | Age: 88
End: 2023-04-19

## 2023-04-19 DIAGNOSIS — J96.11 CHRONIC RESPIRATORY FAILURE WITH HYPOXIA (HCC): ICD-10-CM

## 2023-04-19 DIAGNOSIS — J43.2 CENTRILOBULAR EMPHYSEMA (HCC): Primary | ICD-10-CM

## 2023-04-19 PROCEDURE — 99309 SBSQ NF CARE MODERATE MDM 30: CPT | Performed by: PHYSICIAN ASSISTANT

## 2023-04-20 ENCOUNTER — SNF DISCHARGE (OUTPATIENT)
Dept: INTERNAL MEDICINE CLINIC | Facility: SKILLED NURSING FACILITY | Age: 88
End: 2023-04-20

## 2023-04-20 DIAGNOSIS — M79.89 RIGHT LEG SWELLING: ICD-10-CM

## 2023-04-20 DIAGNOSIS — J42 CHRONIC BRONCHITIS, UNSPECIFIED CHRONIC BRONCHITIS TYPE (HCC): ICD-10-CM

## 2023-04-20 DIAGNOSIS — R53.81 PHYSICAL DECONDITIONING: ICD-10-CM

## 2023-04-20 DIAGNOSIS — N30.00 ACUTE CYSTITIS WITHOUT HEMATURIA: ICD-10-CM

## 2023-04-20 DIAGNOSIS — R09.02 OXYGEN DESATURATION: ICD-10-CM

## 2023-04-20 DIAGNOSIS — E78.2 MIXED HYPERLIPIDEMIA: ICD-10-CM

## 2023-04-20 PROCEDURE — 99316 NF DSCHRG MGMT 30 MIN+: CPT | Performed by: NURSE PRACTITIONER

## 2023-04-26 ENCOUNTER — TELEPHONE (OUTPATIENT)
Dept: PULMONOLOGY | Facility: CLINIC | Age: 88
End: 2023-04-26

## 2023-04-26 NOTE — TELEPHONE ENCOUNTER
Spoke with patient. Appointment scheduled for 5/11/23 at 11:15AM. Verified date, time, place, and where to park. Patient verbalized understanding.

## 2023-04-26 NOTE — TELEPHONE ENCOUNTER
Pt was in rehab and needs home health care - - pt needs appt with  for medicare to cover the [facility] visits - asking for sooner than May opening

## 2023-05-03 ENCOUNTER — TELEPHONE (OUTPATIENT)
Dept: INTERNAL MEDICINE CLINIC | Facility: CLINIC | Age: 88
End: 2023-05-03

## 2023-05-03 NOTE — TELEPHONE ENCOUNTER
Laura Dickinson from [address] [place] [address] services calling to see if  will sign and follow for home health orders

## 2023-05-03 NOTE — TELEPHONE ENCOUNTER
Spoke to Sharonda Richmond and informed him of Drs message stated he will reach out to patients POA.

## 2023-05-03 NOTE — TELEPHONE ENCOUNTER
I cannot sign the forms since the pt is no longer under my care and I have not seen her since 2021. Please call Andrea Smith and ask him to fax to Dr. Akira Dong.

## 2023-05-11 ENCOUNTER — OFFICE VISIT (OUTPATIENT)
Dept: PULMONOLOGY | Facility: CLINIC | Age: 88
End: 2023-05-11

## 2023-05-11 ENCOUNTER — TELEPHONE (OUTPATIENT)
Dept: PULMONOLOGY | Facility: CLINIC | Age: 88
End: 2023-05-11

## 2023-05-11 VITALS
HEIGHT: 65 IN | OXYGEN SATURATION: 87 % | WEIGHT: 116 LBS | RESPIRATION RATE: 14 BRPM | HEART RATE: 77 BPM | DIASTOLIC BLOOD PRESSURE: 85 MMHG | BODY MASS INDEX: 19.33 KG/M2 | SYSTOLIC BLOOD PRESSURE: 140 MMHG
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
## 2023-05-11 DIAGNOSIS — J42 CHRONIC BRONCHITIS, UNSPECIFIED CHRONIC BRONCHITIS TYPE (HCC): Primary | ICD-10-CM

## 2023-05-11 PROCEDURE — 1126F AMNT PAIN NOTED NONE PRSNT: CPT | Performed by: INTERNAL MEDICINE

## 2023-05-11 PROCEDURE — 99213 OFFICE O/P EST LOW 20 MIN: CPT | Performed by: INTERNAL MEDICINE

## 2023-05-11 RX ORDER — ATORVASTATIN CALCIUM 10 MG/1
10 TABLET, FILM COATED ORAL NIGHTLY
Qty: 30 TABLET | Refills: 5 | Status: SHIPPED | OUTPATIENT
Start: 2023-05-11

## 2023-05-11 RX ORDER — FLUTICASONE PROPIONATE AND SALMETEROL 250; 50 UG/1; UG/1
1 POWDER RESPIRATORY (INHALATION) EVERY 12 HOURS SCHEDULED
Qty: 1 EACH | Refills: 5 | Status: SHIPPED | OUTPATIENT
Start: 2023-05-11

## 2023-05-11 RX ORDER — FLUTICASONE FUROATE AND VILANTEROL 100; 25 UG/1; UG/1
1 POWDER RESPIRATORY (INHALATION) EVERY MORNING
Qty: 1 EACH | Refills: 5 | Status: SHIPPED | OUTPATIENT
Start: 2023-05-11

## 2023-05-11 RX ORDER — ALBUTEROL SULFATE 90 UG/1
AEROSOL, METERED RESPIRATORY (INHALATION)
Qty: 1 EACH | Refills: 5 | Status: SHIPPED | OUTPATIENT
Start: 2023-05-11

## 2023-05-11 NOTE — TELEPHONE ENCOUNTER
[place] requesting alt rx for fluticasone as insurance will not cover. Requesting rx for Jesusita Lax or Airduo. Please call. Thank you.

## 2023-05-11 NOTE — PROGRESS NOTES
The patient is a 22-year-old female who I know well from prior evaluation comes in now for follow-up. She had a fall and convalesced at [facility]. Review of Systems:  Vision normal. Ear nose and throat normal. Bowel normal. Bladder function normal. No depression. No thyroid disease. No lymphatic system concerns. No rash. Muscles and joints unremarkable. No weight loss no weight gain. Physical Examination:  Vital signs normal. HEENT examination is unremarkable with pupils equal round and reactive to light and accommodation. Neck without adenopathy, thyromegaly, JVD nor bruit. Lungs clear to auscultation and percussion. Cardiac regular rate and rhythm no murmur. Abdomen nontender, without hepatosplenomegaly and no mass appreciable. Extremities and Musculoskeletal without clubbing cyanosis nor edema, and mobility poor. Neurologic grossly intact with symmetric tone and strength and reflex. Assessment and plan:  1.  Fall- Home health with visiting nurse, PT, OT. Follow-up primary care. 2.  COPD-refilled albuterol and switched Symbicort to Wixela as the Symbicort was no longer covered. Annual flu shot and COVID booster. See me again at the 6-month interval or sooner if needed and contact me promptly if new trouble.

## 2023-05-12 NOTE — TELEPHONE ENCOUNTER
Dr. Jennifer Villa- do you want to order [facility] instead? Naa Primitivo is going to cost pt $429.99/mo.

## 2023-05-12 NOTE — TELEPHONE ENCOUNTER
Per Lori @ [facility] Estefani Del Remedio she cancelled fluticasone-salmeterol rx & Ana Cummins is going to cost pt $429.99, but this is her deductible.

## 2023-05-12 NOTE — TELEPHONE ENCOUNTER
RN spoke to pt re: below. Pratik Charles @ [facility] Estefani Mcdonald pt's prescription insurance is [employer] ([employer]), p. #[phone], ID #[id number], Group #[id number]. Josuémady Callejas @ [employer] judy Pereira pt deductible, co-pay, & cost (p. #[phone]). Taras Oscar RN has to speak w/ Pharmacy Helpdesk at p. #[phone]. Pratik Humphries pt has met $501.66, but remaining amt for total drug span (total amt member spends) is $4,158.34 ($3.34- leftover plan deductible, $505- benefit amt, accumulated amt- 501.66, once deductible met- initial coverage phase- total drug span is met @ $4,660). Stts AirDuo is non-formulary (no price), Symbicort non-formulary ([place] required), Advair -21 mcg ([place] required), & Dulera (tier 4, formulary, 90 day supply- [place] required, cost $320.89) 200-5 mcg 30 day supply (8.8 g) costs $155.75 and 100-5 mcg costs $154.34.

## 2023-05-12 NOTE — TELEPHONE ENCOUNTER
Discussed below w/ pt. Explained if she wants to proceed w/ Chris Riding that she may apply for pt assistance through drug , fluticasone-salmeterol (generic for Advair Diskus) is $60.50 w/ $5.00 standard shipping (out of pocket) through [employer], & will inform Dr. Sanna Bynum that she would like to proceed w/ the latter option per her request. Reassured her RN will f/u next wk. She voiced understanding. Dr. Sanna Bynum- order pending if agreeable. Pls sign when in clinic, so order prints to the correct printer. Thanks.

## 2023-05-13 RX ORDER — FLUTICASONE PROPIONATE AND SALMETEROL 250; 50 UG/1; UG/1
POWDER RESPIRATORY (INHALATION)
Qty: 1 EACH | Refills: 5 | Status: SHIPPED | OUTPATIENT
Start: 2023-05-13

## 2023-05-17 ENCOUNTER — TELEPHONE (OUTPATIENT)
Dept: PULMONOLOGY | Facility: CLINIC | Age: 88
End: 2023-05-17

## 2023-05-17 NOTE — TELEPHONE ENCOUNTER
Faxed signed orders for [address], chart notes, face sheet and med list to [facility]. Received confirmation. Called [facility] for intake and spoke with Jb Singh (). I was told that [facility] cannot provide the patient with home health as she already has an account with a different home health company [facility]). Placed an outgoing call to patient and confirmed that patient is currently getting PT/OT 3 times a week. Patient could not confirm name of home health. Received a fax from [facility] requesting Dr. Richie Dey signature for plan of care oversight. Placed in Dr. Richie Dey folder for review.

## 2023-05-23 NOTE — TELEPHONE ENCOUNTER
Note signed by Dr. Dequan Sinha and faxed back to [facility]. Confirmation received and sent to scanning.

## 2023-05-23 NOTE — TELEPHONE ENCOUNTER
Received a request from [facility] for visit notes dated 5/11/23. Faxed back to [facility]. Confirmation received. Sent to scanning.

## 2023-06-28 ENCOUNTER — OFFICE VISIT (OUTPATIENT)
Dept: INTERNAL MEDICINE CLINIC | Facility: CLINIC | Age: 88
End: 2023-06-28

## 2023-06-28 VITALS
HEART RATE: 69 BPM | BODY MASS INDEX: 17.49 KG/M2 | SYSTOLIC BLOOD PRESSURE: 123 MMHG | WEIGHT: 105 LBS | HEIGHT: 65 IN | DIASTOLIC BLOOD PRESSURE: 79 MMHG | OXYGEN SATURATION: 93 %

## 2023-06-28 DIAGNOSIS — Z91.81 H/O FALL: Primary | ICD-10-CM

## 2023-06-28 DIAGNOSIS — E46 PROTEIN-CALORIE MALNUTRITION, UNSPECIFIED SEVERITY (HCC): ICD-10-CM

## 2023-06-28 PROCEDURE — 99213 OFFICE O/P EST LOW 20 MIN: CPT | Performed by: INTERNAL MEDICINE

## 2023-06-28 PROCEDURE — 1126F AMNT PAIN NOTED NONE PRSNT: CPT | Performed by: INTERNAL MEDICINE

## 2023-06-29 PROBLEM — E46 PROTEIN-CALORIE MALNUTRITION, UNSPECIFIED SEVERITY (HCC): Status: ACTIVE | Noted: 2023-06-29

## 2023-06-29 PROBLEM — E46 PROTEIN-CALORIE MALNUTRITION, UNSPECIFIED SEVERITY (HCC): Status: ACTIVE | Noted: 2023-01-01

## 2023-07-03 ENCOUNTER — TELEPHONE (OUTPATIENT)
Dept: PULMONOLOGY | Facility: CLINIC | Age: 88
End: 2023-07-03

## 2023-10-03 ENCOUNTER — TELEPHONE (OUTPATIENT)
Dept: INTERNAL MEDICINE CLINIC | Facility: CLINIC | Age: 88
End: 2023-10-03

## 2023-10-03 NOTE — TELEPHONE ENCOUNTER
Patient relative called requesting for a home health order for patient to have someone come and shower her weekly. Requesting a call back.    28-Jun-2022 17:25

## 2023-10-03 NOTE — TELEPHONE ENCOUNTER
Called tona Vasquez  of  patient in regards to message below ( identified name and , Christina in [place])     Christina states patient fell and went from hospital to the rehab  When patient returned home from rehab had visiting nursing then Medicare stopped the visits    Asking about home health aide  to have her showered once a week and will it be paid for?  OR can recommend any services to use ?      Please advise and thank you.          Best call back number: Christina carbone  at [phone]

## 2023-10-04 NOTE — TELEPHONE ENCOUNTER
Attempted to call back at [phone], no answer-phone kept ringing.  Not active on [email or web address].  Will attempt again later today.    See TE 07/03/23 regarding Home Health Orders    Debora Shaw MA     AQ    7/31/23  3:10 PM  Note  Received order for medic home health that signed documents have not been received. Called company who confirmed that signed faxes have not been received. Documents for start of care, frequency change, wound care, discharge, ot eval, ot care plan, pt eval, and pt plan placed in Dr. Hutchins's folder for signature.

## 2023-10-04 NOTE — TELEPHONE ENCOUNTER
Attempted again, both the [phone] and [phone]  Unable to leave a message on either phone.  Not mychart active.

## 2023-10-04 NOTE — TELEPHONE ENCOUNTER
For showering once a week , we do not give referrals she is to call insurance and check with them,

## 2023-10-05 NOTE — TELEPHONE ENCOUNTER
Spoke with patient's niece Christina (patient name and  verified). Dr. Asah Mendes's recommendations discussed. Christina verbalized understanding and agrees with plan.

## 2024-01-01 ENCOUNTER — HOSPITAL ENCOUNTER (INPATIENT)
Facility: HOSPITAL | Age: 89
LOS: 3 days | Discharge: NURSING, REHAB OR LONG-TERM CARE | DRG: 291 | End: 2024-01-01
Attending: EMERGENCY MEDICINE | Admitting: HOSPITALIST

## 2024-01-01 ENCOUNTER — APPOINTMENT (OUTPATIENT)
Dept: ULTRASOUND IMAGING | Facility: HOSPITAL | Age: 89
End: 2024-01-01
Attending: INTERNAL MEDICINE

## 2024-01-01 ENCOUNTER — APPOINTMENT (OUTPATIENT)
Dept: CV DIAGNOSTICS | Facility: HOSPITAL | Age: 89
DRG: 291 | End: 2024-01-01
Attending: INTERNAL MEDICINE

## 2024-01-01 ENCOUNTER — APPOINTMENT (OUTPATIENT)
Dept: CT IMAGING | Facility: HOSPITAL | Age: 89
DRG: 291 | End: 2024-01-01
Attending: EMERGENCY MEDICINE

## 2024-01-01 ENCOUNTER — HOSPITAL ENCOUNTER (OUTPATIENT)
Facility: HOSPITAL | Age: 89
Setting detail: OBSERVATION
Discharge: HOSPICE | End: 2024-01-01
Attending: EMERGENCY MEDICINE | Admitting: INTERNAL MEDICINE

## 2024-01-01 ENCOUNTER — APPOINTMENT (OUTPATIENT)
Dept: GENERAL RADIOLOGY | Facility: HOSPITAL | Age: 89
End: 2024-01-01

## 2024-01-01 ENCOUNTER — APPOINTMENT (OUTPATIENT)
Dept: PICC SERVICES | Facility: HOSPITAL | Age: 89
DRG: 189 | End: 2024-01-01
Attending: HOSPITALIST

## 2024-01-01 ENCOUNTER — HOSPITAL ENCOUNTER (INPATIENT)
Facility: HOSPITAL | Age: 89
LOS: 3 days | DRG: 189 | End: 2024-01-01
Attending: HOSPITALIST | Admitting: HOSPITALIST

## 2024-01-01 ENCOUNTER — PATIENT OUTREACH (OUTPATIENT)
Dept: CASE MANAGEMENT | Age: 89
End: 2024-01-01

## 2024-01-01 ENCOUNTER — APPOINTMENT (OUTPATIENT)
Dept: GENERAL RADIOLOGY | Facility: HOSPITAL | Age: 89
DRG: 291 | End: 2024-01-01
Attending: EMERGENCY MEDICINE

## 2024-01-01 ENCOUNTER — APPOINTMENT (OUTPATIENT)
Dept: GENERAL RADIOLOGY | Facility: HOSPITAL | Age: 89
End: 2024-01-01
Attending: HOSPITALIST

## 2024-01-01 VITALS
HEART RATE: 76 BPM | HEIGHT: 65 IN | BODY MASS INDEX: 18.91 KG/M2 | WEIGHT: 113.5 LBS | DIASTOLIC BLOOD PRESSURE: 81 MMHG | OXYGEN SATURATION: 94 % | SYSTOLIC BLOOD PRESSURE: 100 MMHG | TEMPERATURE: 98 F | RESPIRATION RATE: 20 BRPM

## 2024-01-01 VITALS
WEIGHT: 120.69 LBS | HEIGHT: 67 IN | DIASTOLIC BLOOD PRESSURE: 65 MMHG | HEART RATE: 81 BPM | RESPIRATION RATE: 20 BRPM | BODY MASS INDEX: 18.94 KG/M2 | SYSTOLIC BLOOD PRESSURE: 99 MMHG | TEMPERATURE: 96 F | OXYGEN SATURATION: 96 %

## 2024-01-01 VITALS
HEART RATE: 72 BPM | DIASTOLIC BLOOD PRESSURE: 48 MMHG | OXYGEN SATURATION: 94 % | TEMPERATURE: 98 F | RESPIRATION RATE: 6 BRPM | SYSTOLIC BLOOD PRESSURE: 76 MMHG

## 2024-01-01 DIAGNOSIS — I50.9 HEART FAILURE, UNSPECIFIED HF CHRONICITY, UNSPECIFIED HEART FAILURE TYPE (HCC): Primary | ICD-10-CM
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
## 2024-01-01 DIAGNOSIS — I50.9 ACUTE ON CHRONIC CONGESTIVE HEART FAILURE, UNSPECIFIED HEART FAILURE TYPE (HCC): ICD-10-CM

## 2024-01-01 DIAGNOSIS — J44.1 COPD EXACERBATION (HCC): ICD-10-CM

## 2024-01-01 DIAGNOSIS — J96.21 ACUTE ON CHRONIC RESPIRATORY FAILURE WITH HYPOXIA (HCC): ICD-10-CM

## 2024-01-01 DIAGNOSIS — M54.31 SCIATICA OF RIGHT SIDE: ICD-10-CM

## 2024-01-01 DIAGNOSIS — J96.10 CHRONIC RESPIRATORY FAILURE, UNSPECIFIED WHETHER WITH HYPOXIA OR HYPERCAPNIA (HCC): Primary | ICD-10-CM

## 2024-01-01 LAB
ALBUMIN SERPL-MCNC: 3.4 G/DL (ref 3.2–4.8)
ALBUMIN SERPL-MCNC: 3.5 G/DL (ref 3.2–4.8)
ALBUMIN/GLOB SERPL: 1.4 {RATIO} (ref 1–2)
ALBUMIN/GLOB SERPL: 1.5 {RATIO} (ref 1–2)
ALP LIVER SERPL-CCNC: 195 U/L
ALP LIVER SERPL-CCNC: 305 U/L
ALT SERPL-CCNC: 131 U/L
ALT SERPL-CCNC: 18 U/L
ANION GAP SERPL CALC-SCNC: 10 MMOL/L (ref 0–18)
ANION GAP SERPL CALC-SCNC: 10 MMOL/L (ref 0–18)
ANION GAP SERPL CALC-SCNC: 6 MMOL/L (ref 0–18)
ANION GAP SERPL CALC-SCNC: 7 MMOL/L (ref 0–18)
ANION GAP SERPL CALC-SCNC: 8 MMOL/L (ref 0–18)
ANION GAP SERPL CALC-SCNC: 9 MMOL/L (ref 0–18)
AST SERPL-CCNC: 36 U/L (ref ?–34)
AST SERPL-CCNC: 83 U/L (ref ?–34)
ATRIAL RATE: 88 BPM
ATRIAL RATE: 94 BPM
BASOPHILS # BLD AUTO: 0 X10(3) UL (ref 0–0.2)
BASOPHILS # BLD AUTO: 0.01 X10(3) UL (ref 0–0.2)
BASOPHILS # BLD AUTO: 0.01 X10(3) UL (ref 0–0.2)
BASOPHILS # BLD AUTO: 0.03 X10(3) UL (ref 0–0.2)
BASOPHILS # BLD AUTO: 0.04 X10(3) UL (ref 0–0.2)
BASOPHILS NFR BLD AUTO: 0 %
BASOPHILS NFR BLD AUTO: 0.1 %
BASOPHILS NFR BLD AUTO: 0.1 %
BASOPHILS NFR BLD AUTO: 0.2 %
BASOPHILS NFR BLD AUTO: 0.2 %
BASOPHILS NFR BLD AUTO: 0.3 %
BASOPHILS NFR BLD AUTO: 0.4 %
BILIRUB SERPL-MCNC: 0.9 MG/DL (ref 0.2–0.9)
BILIRUB SERPL-MCNC: 1.2 MG/DL (ref 0.2–0.9)
BILIRUB UR QL: NEGATIVE
BILIRUB UR QL: NEGATIVE
BNP SERPL-MCNC: 1794 PG/ML
BNP SERPL-MCNC: 2779 PG/ML
BUN BLD-MCNC: 41 MG/DL (ref 9–23)
BUN BLD-MCNC: 45 MG/DL (ref 9–23)
BUN BLD-MCNC: 50 MG/DL (ref 9–23)
BUN BLD-MCNC: 56 MG/DL (ref 9–23)
BUN BLD-MCNC: 61 MG/DL (ref 9–23)
BUN BLD-MCNC: 63 MG/DL (ref 9–23)
BUN BLD-MCNC: 63 MG/DL (ref 9–23)
BUN BLD-MCNC: 67 MG/DL (ref 9–23)
BUN/CREAT SERPL: 27.3 (ref 10–20)
BUN/CREAT SERPL: 29.3 (ref 10–20)
BUN/CREAT SERPL: 31.6 (ref 10–20)
BUN/CREAT SERPL: 37.9 (ref 10–20)
BUN/CREAT SERPL: 41.7 (ref 10–20)
BUN/CREAT SERPL: 46.2 (ref 10–20)
BUN/CREAT SERPL: 50.4 (ref 10–20)
BUN/CREAT SERPL: 54.5 (ref 10–20)
C DIFF GDH + TOXINS A+B STL QL IA.RAPID: NOT DETECTED
C DIFF TOX B STL QL: POSITIVE
CALCIUM BLD-MCNC: 8.4 MG/DL (ref 8.7–10.4)
CALCIUM BLD-MCNC: 8.5 MG/DL (ref 8.7–10.4)
CALCIUM BLD-MCNC: 8.9 MG/DL (ref 8.7–10.4)
CALCIUM BLD-MCNC: 9 MG/DL (ref 8.7–10.4)
CALCIUM BLD-MCNC: 9.1 MG/DL (ref 8.7–10.4)
CALCIUM BLD-MCNC: 9.3 MG/DL (ref 8.7–10.4)
CHLORIDE SERPL-SCNC: 103 MMOL/L (ref 98–112)
CHLORIDE SERPL-SCNC: 104 MMOL/L (ref 98–112)
CHLORIDE SERPL-SCNC: 104 MMOL/L (ref 98–112)
CHLORIDE SERPL-SCNC: 105 MMOL/L (ref 98–112)
CHLORIDE SERPL-SCNC: 92 MMOL/L (ref 98–112)
CHLORIDE SERPL-SCNC: 94 MMOL/L (ref 98–112)
CHLORIDE SERPL-SCNC: 98 MMOL/L (ref 98–112)
CHLORIDE SERPL-SCNC: 99 MMOL/L (ref 98–112)
CHOLEST SERPL-MCNC: 154 MG/DL (ref ?–200)
CK SERPL-CCNC: 175 U/L
CO2 SERPL-SCNC: 18 MMOL/L (ref 21–32)
CO2 SERPL-SCNC: 19 MMOL/L (ref 21–32)
CO2 SERPL-SCNC: 23 MMOL/L (ref 21–32)
CO2 SERPL-SCNC: 24 MMOL/L (ref 21–32)
CO2 SERPL-SCNC: 25 MMOL/L (ref 21–32)
CO2 SERPL-SCNC: 25 MMOL/L (ref 21–32)
COLOR UR: YELLOW
COLOR UR: YELLOW
CREAT BLD-MCNC: 1.08 MG/DL
CREAT BLD-MCNC: 1.23 MG/DL
CREAT BLD-MCNC: 1.25 MG/DL
CREAT BLD-MCNC: 1.32 MG/DL
CREAT BLD-MCNC: 1.32 MG/DL
CREAT BLD-MCNC: 1.5 MG/DL
CREAT BLD-MCNC: 1.77 MG/DL
CREAT BLD-MCNC: 2.15 MG/DL
DEPRECATED RDW RBC AUTO: 47.9 FL (ref 35.1–46.3)
DEPRECATED RDW RBC AUTO: 47.9 FL (ref 35.1–46.3)
DEPRECATED RDW RBC AUTO: 48.7 FL (ref 35.1–46.3)
DEPRECATED RDW RBC AUTO: 49.6 FL (ref 35.1–46.3)
DEPRECATED RDW RBC AUTO: 53 FL (ref 35.1–46.3)
DEPRECATED RDW RBC AUTO: 53.2 FL (ref 35.1–46.3)
DEPRECATED RDW RBC AUTO: 54.2 FL (ref 35.1–46.3)
EGFRCR SERPLBLD CKD-EPI 2021: 20 ML/MIN/1.73M2 (ref 60–?)
EGFRCR SERPLBLD CKD-EPI 2021: 26 ML/MIN/1.73M2 (ref 60–?)
EGFRCR SERPLBLD CKD-EPI 2021: 31 ML/MIN/1.73M2 (ref 60–?)
EGFRCR SERPLBLD CKD-EPI 2021: 37 ML/MIN/1.73M2 (ref 60–?)
EGFRCR SERPLBLD CKD-EPI 2021: 37 ML/MIN/1.73M2 (ref 60–?)
EGFRCR SERPLBLD CKD-EPI 2021: 39 ML/MIN/1.73M2 (ref 60–?)
EGFRCR SERPLBLD CKD-EPI 2021: 40 ML/MIN/1.73M2 (ref 60–?)
EGFRCR SERPLBLD CKD-EPI 2021: 47 ML/MIN/1.73M2 (ref 60–?)
EOSINOPHIL # BLD AUTO: 0 X10(3) UL (ref 0–0.7)
EOSINOPHIL # BLD AUTO: 0.03 X10(3) UL (ref 0–0.7)
EOSINOPHIL # BLD AUTO: 0.04 X10(3) UL (ref 0–0.7)
EOSINOPHIL # BLD AUTO: 0.05 X10(3) UL (ref 0–0.7)
EOSINOPHIL # BLD AUTO: 0.1 X10(3) UL (ref 0–0.7)
EOSINOPHIL NFR BLD AUTO: 0 %
EOSINOPHIL NFR BLD AUTO: 0.3 %
EOSINOPHIL NFR BLD AUTO: 0.3 %
EOSINOPHIL NFR BLD AUTO: 0.5 %
EOSINOPHIL NFR BLD AUTO: 0.8 %
ERYTHROCYTE [DISTWIDTH] IN BLOOD BY AUTOMATED COUNT: 13.9 % (ref 11–15)
ERYTHROCYTE [DISTWIDTH] IN BLOOD BY AUTOMATED COUNT: 14.3 % (ref 11–15)
ERYTHROCYTE [DISTWIDTH] IN BLOOD BY AUTOMATED COUNT: 15 % (ref 11–15)
ERYTHROCYTE [DISTWIDTH] IN BLOOD BY AUTOMATED COUNT: 15.4 % (ref 11–15)
ERYTHROCYTE [DISTWIDTH] IN BLOOD BY AUTOMATED COUNT: 15.8 % (ref 11–15)
ERYTHROCYTE [DISTWIDTH] IN BLOOD BY AUTOMATED COUNT: 15.9 % (ref 11–15)
ERYTHROCYTE [DISTWIDTH] IN BLOOD BY AUTOMATED COUNT: 16.3 % (ref 11–15)
FLUAV + FLUBV RNA SPEC NAA+PROBE: NEGATIVE
FLUAV + FLUBV RNA SPEC NAA+PROBE: NEGATIVE
GLOBULIN PLAS-MCNC: 2.4 G/DL (ref 2–3.5)
GLOBULIN PLAS-MCNC: 2.4 G/DL (ref 2–3.5)
GLUCOSE BLD-MCNC: 111 MG/DL (ref 70–99)
GLUCOSE BLD-MCNC: 112 MG/DL (ref 70–99)
GLUCOSE BLD-MCNC: 116 MG/DL (ref 70–99)
GLUCOSE BLD-MCNC: 118 MG/DL (ref 70–99)
GLUCOSE BLD-MCNC: 133 MG/DL (ref 70–99)
GLUCOSE BLD-MCNC: 136 MG/DL (ref 70–99)
GLUCOSE BLD-MCNC: 136 MG/DL (ref 70–99)
GLUCOSE BLD-MCNC: 158 MG/DL (ref 70–99)
GLUCOSE UR-MCNC: NORMAL MG/DL
GLUCOSE UR-MCNC: NORMAL MG/DL
HCT VFR BLD AUTO: 42.5 %
HCT VFR BLD AUTO: 42.5 %
HCT VFR BLD AUTO: 43 %
HCT VFR BLD AUTO: 43.7 %
HCT VFR BLD AUTO: 45.3 %
HCT VFR BLD AUTO: 45.7 %
HCT VFR BLD AUTO: 45.9 %
HDLC SERPL-MCNC: 37 MG/DL (ref 40–59)
HGB BLD-MCNC: 14.3 G/DL
HGB BLD-MCNC: 14.4 G/DL
HGB BLD-MCNC: 14.4 G/DL
HGB BLD-MCNC: 14.5 G/DL
HGB BLD-MCNC: 14.9 G/DL
HGB BLD-MCNC: 15.6 G/DL
HGB BLD-MCNC: 15.7 G/DL
HGB UR QL STRIP.AUTO: NEGATIVE
HGB UR QL STRIP.AUTO: NEGATIVE
HYALINE CASTS #/AREA URNS AUTO: PRESENT /LPF
HYALINE CASTS #/AREA URNS AUTO: PRESENT /LPF
IMM GRANULOCYTES # BLD AUTO: 0.05 X10(3) UL (ref 0–1)
IMM GRANULOCYTES # BLD AUTO: 0.07 X10(3) UL (ref 0–1)
IMM GRANULOCYTES # BLD AUTO: 0.07 X10(3) UL (ref 0–1)
IMM GRANULOCYTES # BLD AUTO: 0.08 X10(3) UL (ref 0–1)
IMM GRANULOCYTES # BLD AUTO: 0.08 X10(3) UL (ref 0–1)
IMM GRANULOCYTES # BLD AUTO: 0.09 X10(3) UL (ref 0–1)
IMM GRANULOCYTES # BLD AUTO: 0.12 X10(3) UL (ref 0–1)
IMM GRANULOCYTES NFR BLD: 0.5 %
IMM GRANULOCYTES NFR BLD: 0.7 %
IMM GRANULOCYTES NFR BLD: 0.8 %
IMM GRANULOCYTES NFR BLD: 0.8 %
IMM GRANULOCYTES NFR BLD: 1 %
KETONES UR-MCNC: NEGATIVE MG/DL
KETONES UR-MCNC: NEGATIVE MG/DL
LDLC SERPL CALC-MCNC: 94 MG/DL (ref ?–100)
LEUKOCYTE ESTERASE UR QL STRIP.AUTO: 500
LEUKOCYTE ESTERASE UR QL STRIP.AUTO: NEGATIVE
LYMPHOCYTES # BLD AUTO: 0.55 X10(3) UL (ref 1–4)
LYMPHOCYTES # BLD AUTO: 0.67 X10(3) UL (ref 1–4)
LYMPHOCYTES # BLD AUTO: 0.71 X10(3) UL (ref 1–4)
LYMPHOCYTES # BLD AUTO: 1.09 X10(3) UL (ref 1–4)
LYMPHOCYTES # BLD AUTO: 1.14 X10(3) UL (ref 1–4)
LYMPHOCYTES # BLD AUTO: 1.16 X10(3) UL (ref 1–4)
LYMPHOCYTES # BLD AUTO: 1.35 X10(3) UL (ref 1–4)
LYMPHOCYTES NFR BLD AUTO: 10.8 %
LYMPHOCYTES NFR BLD AUTO: 11 %
LYMPHOCYTES NFR BLD AUTO: 12 %
LYMPHOCYTES NFR BLD AUTO: 5.8 %
LYMPHOCYTES NFR BLD AUTO: 6.2 %
LYMPHOCYTES NFR BLD AUTO: 6.4 %
LYMPHOCYTES NFR BLD AUTO: 8.6 %
MAGNESIUM SERPL-MCNC: 2.6 MG/DL (ref 1.6–2.6)
MAGNESIUM SERPL-MCNC: 2.6 MG/DL (ref 1.6–2.6)
MCH RBC QN AUTO: 31.6 PG (ref 26–34)
MCH RBC QN AUTO: 32.1 PG (ref 26–34)
MCH RBC QN AUTO: 32.8 PG (ref 26–34)
MCH RBC QN AUTO: 32.9 PG (ref 26–34)
MCH RBC QN AUTO: 32.9 PG (ref 26–34)
MCH RBC QN AUTO: 33.6 PG (ref 26–34)
MCH RBC QN AUTO: 34.4 PG (ref 26–34)
MCHC RBC AUTO-ENTMCNC: 32.5 G/DL (ref 31–37)
MCHC RBC AUTO-ENTMCNC: 32.7 G/DL (ref 31–37)
MCHC RBC AUTO-ENTMCNC: 33.7 G/DL (ref 31–37)
MCHC RBC AUTO-ENTMCNC: 33.9 G/DL (ref 31–37)
MCHC RBC AUTO-ENTMCNC: 33.9 G/DL (ref 31–37)
MCHC RBC AUTO-ENTMCNC: 34.1 G/DL (ref 31–37)
MCHC RBC AUTO-ENTMCNC: 34.7 G/DL (ref 31–37)
MCV RBC AUTO: 96.4 FL
MCV RBC AUTO: 96.8 FL
MCV RBC AUTO: 97.2 FL
MCV RBC AUTO: 97.5 FL
MCV RBC AUTO: 98.2 FL
MCV RBC AUTO: 99.3 FL
MCV RBC AUTO: 99.3 FL
MONOCYTES # BLD AUTO: 0.85 X10(3) UL (ref 0.1–1)
MONOCYTES # BLD AUTO: 0.86 X10(3) UL (ref 0.1–1)
MONOCYTES # BLD AUTO: 0.92 X10(3) UL (ref 0.1–1)
MONOCYTES # BLD AUTO: 1 X10(3) UL (ref 0.1–1)
MONOCYTES # BLD AUTO: 1.01 X10(3) UL (ref 0.1–1)
MONOCYTES # BLD AUTO: 1.19 X10(3) UL (ref 0.1–1)
MONOCYTES # BLD AUTO: 1.52 X10(3) UL (ref 0.1–1)
MONOCYTES NFR BLD AUTO: 10.6 %
MONOCYTES NFR BLD AUTO: 11.1 %
MONOCYTES NFR BLD AUTO: 11.5 %
MONOCYTES NFR BLD AUTO: 8.1 %
MONOCYTES NFR BLD AUTO: 8.3 %
MONOCYTES NFR BLD AUTO: 8.4 %
MONOCYTES NFR BLD AUTO: 8.9 %
NEUTROPHILS # BLD AUTO: 10.33 X10 (3) UL (ref 1.5–7.7)
NEUTROPHILS # BLD AUTO: 10.33 X10(3) UL (ref 1.5–7.7)
NEUTROPHILS # BLD AUTO: 10.35 X10 (3) UL (ref 1.5–7.7)
NEUTROPHILS # BLD AUTO: 10.35 X10(3) UL (ref 1.5–7.7)
NEUTROPHILS # BLD AUTO: 7.1 X10 (3) UL (ref 1.5–7.7)
NEUTROPHILS # BLD AUTO: 7.1 X10(3) UL (ref 1.5–7.7)
NEUTROPHILS # BLD AUTO: 7.47 X10 (3) UL (ref 1.5–7.7)
NEUTROPHILS # BLD AUTO: 7.47 X10(3) UL (ref 1.5–7.7)
NEUTROPHILS # BLD AUTO: 8.02 X10 (3) UL (ref 1.5–7.7)
NEUTROPHILS # BLD AUTO: 8.02 X10(3) UL (ref 1.5–7.7)
NEUTROPHILS # BLD AUTO: 8.82 X10 (3) UL (ref 1.5–7.7)
NEUTROPHILS # BLD AUTO: 8.82 X10(3) UL (ref 1.5–7.7)
NEUTROPHILS # BLD AUTO: 9.74 X10 (3) UL (ref 1.5–7.7)
NEUTROPHILS # BLD AUTO: 9.74 X10(3) UL (ref 1.5–7.7)
NEUTROPHILS NFR BLD AUTO: 77.7 %
NEUTROPHILS NFR BLD AUTO: 78.3 %
NEUTROPHILS NFR BLD AUTO: 79.4 %
NEUTROPHILS NFR BLD AUTO: 79.4 %
NEUTROPHILS NFR BLD AUTO: 82 %
NEUTROPHILS NFR BLD AUTO: 82.1 %
NEUTROPHILS NFR BLD AUTO: 85 %
NITRITE UR QL STRIP.AUTO: NEGATIVE
NITRITE UR QL STRIP.AUTO: NEGATIVE
NONHDLC SERPL-MCNC: 117 MG/DL (ref ?–130)
OSMOLALITY SERPL CALC.SUM OF ELEC: 275 MOSM/KG (ref 275–295)
OSMOLALITY SERPL CALC.SUM OF ELEC: 279 MOSM/KG (ref 275–295)
OSMOLALITY SERPL CALC.SUM OF ELEC: 282 MOSM/KG (ref 275–295)
OSMOLALITY SERPL CALC.SUM OF ELEC: 284 MOSM/KG (ref 275–295)
OSMOLALITY SERPL CALC.SUM OF ELEC: 289 MOSM/KG (ref 275–295)
OSMOLALITY SERPL CALC.SUM OF ELEC: 299 MOSM/KG (ref 275–295)
OSMOLALITY SERPL CALC.SUM OF ELEC: 303 MOSM/KG (ref 275–295)
OSMOLALITY SERPL CALC.SUM OF ELEC: 303 MOSM/KG (ref 275–295)
OSMOLALITY UR: 331 MOSM/KG (ref 300–1100)
P AXIS: 68 DEGREES
P AXIS: 81 DEGREES
P-R INTERVAL: 152 MS
P-R INTERVAL: 160 MS
PH UR: 5 [PH] (ref 5–8)
PH UR: 6 [PH] (ref 5–8)
PHOSPHATE SERPL-MCNC: 4.7 MG/DL (ref 2.4–5.1)
PHOSPHATE SERPL-MCNC: 5.1 MG/DL (ref 2.4–5.1)
PLATELET # BLD AUTO: 171 10(3)UL (ref 150–450)
PLATELET # BLD AUTO: 188 10(3)UL (ref 150–450)
PLATELET # BLD AUTO: 199 10(3)UL (ref 150–450)
PLATELET # BLD AUTO: 213 10(3)UL (ref 150–450)
PLATELET # BLD AUTO: 217 10(3)UL (ref 150–450)
PLATELET # BLD AUTO: 224 10(3)UL (ref 150–450)
PLATELET # BLD AUTO: 242 10(3)UL (ref 150–450)
PLATELETS.RETICULATED NFR BLD AUTO: 7.1 % (ref 0–7)
PLATELETS.RETICULATED NFR BLD AUTO: 8.9 % (ref 0–7)
POTASSIUM SERPL-SCNC: 4.1 MMOL/L (ref 3.5–5.1)
POTASSIUM SERPL-SCNC: 4.3 MMOL/L (ref 3.5–5.1)
POTASSIUM SERPL-SCNC: 4.5 MMOL/L (ref 3.5–5.1)
POTASSIUM SERPL-SCNC: 4.9 MMOL/L (ref 3.5–5.1)
POTASSIUM SERPL-SCNC: 4.9 MMOL/L (ref 3.5–5.1)
POTASSIUM SERPL-SCNC: 5.2 MMOL/L (ref 3.5–5.1)
POTASSIUM SERPL-SCNC: 5.4 MMOL/L (ref 3.5–5.1)
POTASSIUM SERPL-SCNC: 5.4 MMOL/L (ref 3.5–5.1)
POTASSIUM SERPL-SCNC: 5.5 MMOL/L (ref 3.5–5.1)
PROCALCITONIN SERPL-MCNC: 0.18 NG/ML (ref ?–0.05)
PROT SERPL-MCNC: 5.8 G/DL (ref 5.7–8.2)
PROT SERPL-MCNC: 5.9 G/DL (ref 5.7–8.2)
PROT UR-MCNC: 30 MG/DL
PROT UR-MCNC: 50 MG/DL
Q-T INTERVAL: 354 MS
Q-T INTERVAL: 370 MS
QRS DURATION: 88 MS
QRS DURATION: 90 MS
QTC CALCULATION (BEZET): 428 MS
QTC CALCULATION (BEZET): 462 MS
R AXIS: -15 DEGREES
R AXIS: -62 DEGREES
RBC # BLD AUTO: 4.28 X10(6)UL
RBC # BLD AUTO: 4.39 X10(6)UL
RBC # BLD AUTO: 4.41 X10(6)UL
RBC # BLD AUTO: 4.45 X10(6)UL
RBC # BLD AUTO: 4.56 X10(6)UL
RBC # BLD AUTO: 4.72 X10(6)UL
RBC # BLD AUTO: 4.74 X10(6)UL
RSV RNA SPEC NAA+PROBE: NEGATIVE
SARS-COV-2 RNA RESP QL NAA+PROBE: NOT DETECTED
SODIUM SERPL-SCNC: 125 MMOL/L (ref 136–145)
SODIUM SERPL-SCNC: 126 MMOL/L (ref 136–145)
SODIUM SERPL-SCNC: 127 MMOL/L (ref 136–145)
SODIUM SERPL-SCNC: 130 MMOL/L (ref 136–145)
SODIUM SERPL-SCNC: 133 MMOL/L (ref 136–145)
SODIUM SERPL-SCNC: 135 MMOL/L (ref 136–145)
SODIUM SERPL-SCNC: 136 MMOL/L (ref 136–145)
SODIUM SERPL-SCNC: 137 MMOL/L (ref 136–145)
SODIUM SERPL-SCNC: <10 MMOL/L
SP GR UR STRIP: 1.02 (ref 1–1.03)
SP GR UR STRIP: 1.03 (ref 1–1.03)
T AXIS: 42 DEGREES
T AXIS: 59 DEGREES
TRIGL SERPL-MCNC: 131 MG/DL (ref 30–149)
TROPONIN I SERPL HS-MCNC: 161 NG/L
TROPONIN I SERPL HS-MCNC: 166 NG/L
TROPONIN I SERPL HS-MCNC: 171 NG/L
TROPONIN I SERPL HS-MCNC: 33 NG/L
UROBILINOGEN UR STRIP-ACNC: 2
UROBILINOGEN UR STRIP-ACNC: NORMAL
VENTRICULAR RATE: 88 BPM
VENTRICULAR RATE: 94 BPM
VLDLC SERPL CALC-MCNC: 21 MG/DL (ref 0–30)
WBC # BLD AUTO: 10.1 X10(3) UL (ref 4–11)
WBC # BLD AUTO: 10.8 X10(3) UL (ref 4–11)
WBC # BLD AUTO: 12.2 X10(3) UL (ref 4–11)
WBC # BLD AUTO: 12.3 X10(3) UL (ref 4–11)
WBC # BLD AUTO: 13.2 X10(3) UL (ref 4–11)
WBC # BLD AUTO: 8.7 X10(3) UL (ref 4–11)
WBC # BLD AUTO: 9.6 X10(3) UL (ref 4–11)
WBC #/AREA URNS AUTO: >50 /HPF

## 2024-01-01 PROCEDURE — 76770 US EXAM ABDO BACK WALL COMP: CPT | Performed by: INTERNAL MEDICINE

## 2024-01-01 PROCEDURE — 70450 CT HEAD/BRAIN W/O DYE: CPT | Performed by: EMERGENCY MEDICINE

## 2024-01-01 PROCEDURE — 99232 SBSQ HOSP IP/OBS MODERATE 35: CPT | Performed by: INTERNAL MEDICINE

## 2024-01-01 PROCEDURE — 99233 SBSQ HOSP IP/OBS HIGH 50: CPT | Performed by: HOSPITALIST

## 2024-01-01 PROCEDURE — 71045 X-RAY EXAM CHEST 1 VIEW: CPT | Performed by: EMERGENCY MEDICINE

## 2024-01-01 PROCEDURE — 99223 1ST HOSP IP/OBS HIGH 75: CPT | Performed by: HOSPITALIST

## 2024-01-01 PROCEDURE — 99231 SBSQ HOSP IP/OBS SF/LOW 25: CPT | Performed by: INTERNAL MEDICINE

## 2024-01-01 PROCEDURE — 99222 1ST HOSP IP/OBS MODERATE 55: CPT | Performed by: HOSPITALIST

## 2024-01-01 PROCEDURE — 99233 SBSQ HOSP IP/OBS HIGH 50: CPT | Performed by: INTERNAL MEDICINE

## 2024-01-01 PROCEDURE — 99223 1ST HOSP IP/OBS HIGH 75: CPT | Performed by: INTERNAL MEDICINE

## 2024-01-01 PROCEDURE — 93306 TTE W/DOPPLER COMPLETE: CPT | Performed by: INTERNAL MEDICINE

## 2024-01-01 PROCEDURE — 72125 CT NECK SPINE W/O DYE: CPT | Performed by: EMERGENCY MEDICINE

## 2024-01-01 PROCEDURE — 99231 SBSQ HOSP IP/OBS SF/LOW 25: CPT | Performed by: REGISTERED NURSE

## 2024-01-01 PROCEDURE — 71045 X-RAY EXAM CHEST 1 VIEW: CPT | Performed by: HOSPITALIST

## 2024-01-01 PROCEDURE — 99232 SBSQ HOSP IP/OBS MODERATE 35: CPT | Performed by: HOSPITALIST

## 2024-01-01 PROCEDURE — 99239 HOSP IP/OBS DSCHRG MGMT >30: CPT | Performed by: HOSPITALIST

## 2024-01-01 PROCEDURE — 99222 1ST HOSP IP/OBS MODERATE 55: CPT | Performed by: REGISTERED NURSE

## 2024-01-01 PROCEDURE — 99238 HOSP IP/OBS DSCHRG MGMT 30/<: CPT | Performed by: HOSPITALIST

## 2024-01-01 RX ORDER — MELATONIN
3 NIGHTLY PRN
Status: DISCONTINUED | OUTPATIENT
Start: 2024-01-01 | End: 2024-01-01

## 2024-01-01 RX ORDER — DEXTROSE AND SODIUM CHLORIDE 5; .45 G/100ML; G/100ML
INJECTION, SOLUTION INTRAVENOUS CONTINUOUS
Status: ACTIVE | OUTPATIENT
Start: 2024-01-01 | End: 2024-01-01

## 2024-01-01 RX ORDER — HALOPERIDOL 5 MG/ML
2 INJECTION INTRAMUSCULAR
Status: DISCONTINUED | OUTPATIENT
Start: 2024-01-01 | End: 2024-01-01

## 2024-01-01 RX ORDER — IPRATROPIUM BROMIDE AND ALBUTEROL SULFATE 2.5; .5 MG/3ML; MG/3ML
3 SOLUTION RESPIRATORY (INHALATION) EVERY 6 HOURS PRN
Status: DISCONTINUED | OUTPATIENT
Start: 2024-01-01 | End: 2024-01-01

## 2024-01-01 RX ORDER — FUROSEMIDE 20 MG/1
10 TABLET ORAL DAILY
Status: DISCONTINUED | OUTPATIENT
Start: 2024-01-01 | End: 2024-01-01

## 2024-01-01 RX ORDER — MORPHINE SULFATE 20 MG/ML
10 SOLUTION ORAL
Status: DISCONTINUED | OUTPATIENT
Start: 2024-01-01 | End: 2024-01-01

## 2024-01-01 RX ORDER — ATORVASTATIN CALCIUM 10 MG/1
10 TABLET, FILM COATED ORAL NIGHTLY
Status: DISCONTINUED | OUTPATIENT
Start: 2024-01-01 | End: 2024-01-01

## 2024-01-01 RX ORDER — FUROSEMIDE 10 MG/ML
20 INJECTION INTRAMUSCULAR; INTRAVENOUS ONCE
Status: COMPLETED | OUTPATIENT
Start: 2024-01-01 | End: 2024-01-01

## 2024-01-01 RX ORDER — PSEUDOEPHEDRINE HCL 30 MG
100 TABLET ORAL 2 TIMES DAILY
Status: SHIPPED | COMMUNITY
Start: 2024-01-01

## 2024-01-01 RX ORDER — FLUTICASONE FUROATE AND VILANTEROL 100; 25 UG/1; UG/1
1 POWDER RESPIRATORY (INHALATION) DAILY
Status: DISCONTINUED | OUTPATIENT
Start: 2024-01-01 | End: 2024-01-01

## 2024-01-01 RX ORDER — SODIUM CHLORIDE 9 MG/ML
INJECTION, SOLUTION INTRAVENOUS CONTINUOUS
Status: DISCONTINUED | OUTPATIENT
Start: 2024-01-01 | End: 2024-01-01

## 2024-01-01 RX ORDER — ACETAMINOPHEN 500 MG
500 TABLET ORAL EVERY 4 HOURS PRN
Status: DISCONTINUED | OUTPATIENT
Start: 2024-01-01 | End: 2024-01-01

## 2024-01-01 RX ORDER — HALOPERIDOL 5 MG/ML
1 INJECTION INTRAMUSCULAR
Status: DISCONTINUED | OUTPATIENT
Start: 2024-01-01 | End: 2024-01-01

## 2024-01-01 RX ORDER — IPRATROPIUM BROMIDE AND ALBUTEROL SULFATE 2.5; .5 MG/3ML; MG/3ML
3 SOLUTION RESPIRATORY (INHALATION) ONCE
Status: COMPLETED | OUTPATIENT
Start: 2024-01-01 | End: 2024-01-01

## 2024-01-01 RX ORDER — POLYETHYLENE GLYCOL 3350 17 G/17G
17 POWDER, FOR SOLUTION ORAL DAILY PRN
Status: SHIPPED | COMMUNITY
Start: 2024-01-01

## 2024-01-01 RX ORDER — HEPARIN SODIUM 5000 [USP'U]/ML
5000 INJECTION, SOLUTION INTRAVENOUS; SUBCUTANEOUS EVERY 12 HOURS SCHEDULED
Status: DISCONTINUED | OUTPATIENT
Start: 2024-01-01 | End: 2024-01-01

## 2024-01-01 RX ORDER — ALBUMIN (HUMAN) 12.5 G/50ML
12.5 SOLUTION INTRAVENOUS ONCE
Status: COMPLETED | OUTPATIENT
Start: 2024-01-01 | End: 2024-01-01

## 2024-01-01 RX ORDER — FUROSEMIDE 10 MG/ML
40 INJECTION INTRAMUSCULAR; INTRAVENOUS EVERY 8 HOURS PRN
Status: DISCONTINUED | OUTPATIENT
Start: 2024-01-01 | End: 2024-01-01

## 2024-01-01 RX ORDER — ACETAMINOPHEN 650 MG/1
650 SUPPOSITORY RECTAL EVERY 4 HOURS PRN
Status: DISCONTINUED | OUTPATIENT
Start: 2024-01-01 | End: 2024-01-01

## 2024-01-01 RX ORDER — ALBUTEROL SULFATE 2.5 MG/3ML
2.5 SOLUTION RESPIRATORY (INHALATION) EVERY 4 HOURS PRN
Status: DISCONTINUED | OUTPATIENT
Start: 2024-01-01 | End: 2024-01-01

## 2024-01-01 RX ORDER — ENEMA 19; 7 G/133ML; G/133ML
1 ENEMA RECTAL ONCE AS NEEDED
Status: DISCONTINUED | OUTPATIENT
Start: 2024-01-01 | End: 2024-01-01

## 2024-01-01 RX ORDER — HYDROCODONE BITARTRATE AND ACETAMINOPHEN 5; 325 MG/1; MG/1
1 TABLET ORAL EVERY 4 HOURS PRN
Status: DISCONTINUED | OUTPATIENT
Start: 2024-01-01 | End: 2024-01-01

## 2024-01-01 RX ORDER — PREDNISONE 20 MG/1
40 TABLET ORAL DAILY
Status: DISCONTINUED | OUTPATIENT
Start: 2024-01-01 | End: 2024-01-01

## 2024-01-01 RX ORDER — SCOLOPAMINE TRANSDERMAL SYSTEM 1 MG/1
1 PATCH, EXTENDED RELEASE TRANSDERMAL
Status: DISCONTINUED | OUTPATIENT
Start: 2024-01-01 | End: 2024-01-01

## 2024-01-01 RX ORDER — BISACODYL 10 MG
10 SUPPOSITORY, RECTAL RECTAL
Status: DISCONTINUED | OUTPATIENT
Start: 2024-01-01 | End: 2024-01-01

## 2024-01-01 RX ORDER — LORAZEPAM 2 MG/ML
1 CONCENTRATE ORAL EVERY 4 HOURS PRN
Status: DISCONTINUED | OUTPATIENT
Start: 2024-01-01 | End: 2024-01-01

## 2024-01-01 RX ORDER — SODIUM CHLORIDE 0.9 % (FLUSH) 0.9 %
10 SYRINGE (ML) INJECTION AS NEEDED
Status: DISCONTINUED | OUTPATIENT
Start: 2024-01-01 | End: 2024-01-01

## 2024-01-01 RX ORDER — CEFAZOLIN SODIUM/WATER 2 G/20 ML
2 SYRINGE (ML) INTRAVENOUS EVERY 12 HOURS
Status: DISCONTINUED | OUTPATIENT
Start: 2024-01-01 | End: 2024-01-01

## 2024-01-01 RX ORDER — ACETAMINOPHEN 325 MG/1
650 TABLET ORAL ONCE
Status: COMPLETED | OUTPATIENT
Start: 2024-01-01 | End: 2024-01-01

## 2024-01-01 RX ORDER — AZITHROMYCIN 250 MG/1
250 TABLET, FILM COATED ORAL NIGHTLY
Qty: 4 TABLET | Refills: 0 | Status: COMPLETED | OUTPATIENT
Start: 2024-01-01 | End: 2024-01-01

## 2024-01-01 RX ORDER — POLYETHYLENE GLYCOL 3350 17 G/17G
17 POWDER, FOR SOLUTION ORAL DAILY PRN
Status: DISCONTINUED | OUTPATIENT
Start: 2024-01-01 | End: 2024-01-01

## 2024-01-01 RX ORDER — HYDROCODONE BITARTRATE AND ACETAMINOPHEN 5; 325 MG/1; MG/1
1 TABLET ORAL EVERY 4 HOURS PRN
Qty: 20 TABLET | Refills: 0 | Status: SHIPPED | OUTPATIENT
Start: 2024-01-01 | End: 2024-05-20

## 2024-01-01 RX ORDER — DOCUSATE SODIUM 100 MG/1
100 CAPSULE, LIQUID FILLED ORAL 2 TIMES DAILY
Status: DISCONTINUED | OUTPATIENT
Start: 2024-01-01 | End: 2024-01-01

## 2024-01-01 RX ORDER — MORPHINE SULFATE 2 MG/ML
1 INJECTION, SOLUTION INTRAMUSCULAR; INTRAVENOUS
Status: DISCONTINUED | OUTPATIENT
Start: 2024-01-01 | End: 2024-01-01

## 2024-01-01 RX ORDER — HEPARIN SODIUM 5000 [USP'U]/ML
5000 INJECTION, SOLUTION INTRAVENOUS; SUBCUTANEOUS EVERY 8 HOURS SCHEDULED
Status: DISCONTINUED | OUTPATIENT
Start: 2024-01-01 | End: 2024-01-01

## 2024-01-01 RX ORDER — CEFADROXIL 500 MG/1
500 CAPSULE ORAL 2 TIMES DAILY
Status: SHIPPED | COMMUNITY
Start: 2024-01-01 | End: 2024-01-01

## 2024-01-01 RX ORDER — GLYCOPYRROLATE 0.2 MG/ML
0.4 INJECTION, SOLUTION INTRAMUSCULAR; INTRAVENOUS
Status: DISCONTINUED | OUTPATIENT
Start: 2024-01-01 | End: 2024-01-01

## 2024-01-01 RX ORDER — IPRATROPIUM BROMIDE AND ALBUTEROL SULFATE 2.5; .5 MG/3ML; MG/3ML
3 SOLUTION RESPIRATORY (INHALATION) EVERY 6 HOURS PRN
Status: SHIPPED | COMMUNITY
Start: 2024-01-01

## 2024-01-01 RX ORDER — ONDANSETRON 2 MG/ML
4 INJECTION INTRAMUSCULAR; INTRAVENOUS EVERY 6 HOURS PRN
Status: DISCONTINUED | OUTPATIENT
Start: 2024-01-01 | End: 2024-01-01

## 2024-01-01 RX ORDER — FLUTICASONE FUROATE AND VILANTEROL 100; 25 UG/1; UG/1
1 POWDER RESPIRATORY (INHALATION) EVERY MORNING
Status: DISCONTINUED | OUTPATIENT
Start: 2024-01-01 | End: 2024-01-01

## 2024-01-01 RX ORDER — MIDODRINE HYDROCHLORIDE 5 MG/1
5 TABLET ORAL 3 TIMES DAILY
Qty: 90 TABLET | Refills: 0 | Status: SHIPPED | OUTPATIENT
Start: 2024-01-01 | End: 2024-05-20

## 2024-01-01 RX ORDER — PREDNISONE 20 MG/1
TABLET ORAL
Qty: 7 TABLET | Refills: 0 | Status: SHIPPED | OUTPATIENT
Start: 2024-01-01 | End: 2024-01-01

## 2024-01-01 RX ORDER — METHYLPREDNISOLONE SODIUM SUCCINATE 40 MG/ML
40 INJECTION, POWDER, LYOPHILIZED, FOR SOLUTION INTRAMUSCULAR; INTRAVENOUS ONCE
Status: COMPLETED | OUTPATIENT
Start: 2024-01-01 | End: 2024-01-01

## 2024-01-01 RX ORDER — MIDODRINE HYDROCHLORIDE 5 MG/1
5 TABLET ORAL 3 TIMES DAILY
Status: DISCONTINUED | OUTPATIENT
Start: 2024-01-01 | End: 2024-01-01

## 2024-01-01 RX ORDER — AZITHROMYCIN 250 MG/1
500 TABLET, FILM COATED ORAL ONCE
Qty: 2 TABLET | Refills: 0 | Status: COMPLETED | OUTPATIENT
Start: 2024-01-01 | End: 2024-01-01

## 2024-04-29 ENCOUNTER — TELEPHONE (OUTPATIENT)
Dept: PULMONOLOGY | Facility: CLINIC | Age: 89
End: 2024-04-29

## 2024-04-29 NOTE — TELEPHONE ENCOUNTER
Spoke with patient, states she has shortness of breath with activity and at rest, cough with little amount of white mucous, chest tightness, constant, achy chest pain rates 5/10.  Requesting appointment with Dr. Hutchins, follow up appointment scheduled with Dr. Hutchins on 5/7/24 at 3:45pm, verified date, time, location & parking , patient verbalized understanding.

## 2024-04-29 NOTE — TELEPHONE ENCOUNTER
Patient's relative calling regards sooner appointment if possible for patient. Currently scheduled for 10/28/24. Please call and advise.

## 2024-05-01 ENCOUNTER — APPOINTMENT (OUTPATIENT)
Dept: CT IMAGING | Facility: HOSPITAL | Age: 89
DRG: 291 | End: 2024-05-01
Attending: EMERGENCY MEDICINE

## 2024-05-01 ENCOUNTER — APPOINTMENT (OUTPATIENT)
Dept: CT IMAGING | Facility: HOSPITAL | Age: 89
End: 2024-05-01
Attending: EMERGENCY MEDICINE

## 2024-05-01 ENCOUNTER — APPOINTMENT (OUTPATIENT)
Dept: GENERAL RADIOLOGY | Facility: HOSPITAL | Age: 89
DRG: 291 | End: 2024-05-01
Attending: EMERGENCY MEDICINE

## 2024-05-01 ENCOUNTER — APPOINTMENT (OUTPATIENT)
Dept: GENERAL RADIOLOGY | Facility: HOSPITAL | Age: 89
End: 2024-05-01
Attending: EMERGENCY MEDICINE

## 2024-05-01 ENCOUNTER — APPOINTMENT (OUTPATIENT)
Dept: CV DIAGNOSTICS | Facility: HOSPITAL | Age: 89
DRG: 291 | End: 2024-05-01
Attending: INTERNAL MEDICINE

## 2024-05-01 ENCOUNTER — APPOINTMENT (OUTPATIENT)
Dept: CV DIAGNOSTICS | Facility: HOSPITAL | Age: 89
End: 2024-05-01
Attending: INTERNAL MEDICINE

## 2024-05-01 ENCOUNTER — HOSPITAL ENCOUNTER (INPATIENT)
Facility: HOSPITAL | Age: 89
LOS: 3 days | Discharge: NURSING, REHAB OR LONG-TERM CARE | End: 2024-05-04
Attending: EMERGENCY MEDICINE | Admitting: HOSPITALIST

## 2024-05-01 ENCOUNTER — HOSPITAL ENCOUNTER (INPATIENT)
Facility: HOSPITAL | Age: 89
LOS: 3 days | Discharge: NURSING, REHAB OR LONG-TERM CARE | DRG: 291 | End: 2024-05-04
Attending: EMERGENCY MEDICINE | Admitting: HOSPITALIST

## 2024-05-01 PROBLEM — I50.9 HEART FAILURE, UNSPECIFIED HF CHRONICITY, UNSPECIFIED HEART FAILURE TYPE (HCC): Status: ACTIVE | Noted: 2024-05-01

## 2024-05-01 PROBLEM — J96.21 ACUTE ON CHRONIC RESPIRATORY FAILURE WITH HYPOXIA (HCC): Status: ACTIVE | Noted: 2024-01-01

## 2024-05-01 PROBLEM — J96.21 ACUTE ON CHRONIC RESPIRATORY FAILURE WITH HYPOXIA (HCC): Status: ACTIVE | Noted: 2024-05-01

## 2024-05-01 PROBLEM — I50.9 HEART FAILURE, UNSPECIFIED HF CHRONICITY, UNSPECIFIED HEART FAILURE TYPE (HCC): Status: ACTIVE | Noted: 2024-01-01

## 2024-05-01 NOTE — CM/SW NOTE
05/01/24 1700   CM/SW Referral Data   Referral Source Social Work (self-referral)   Reason for Referral Discharge planning   Informant Patient   Medical Hx   Does patient have an established PCP? Yes   Patient Info   Patient's Current Mental Status at Time of Assessment Alert;Oriented   Patient Communication Issues Hearing impairment   Patient's Home Environment House   Number of Levels in Home 2   Patient lives with Alone   Patient Status Prior to Admission   Independent with ADLs and Mobility Yes   Discharge Needs   Anticipated D/C needs To be determined         MDO DC Planning- likely snf placement    SW met with patient at bedside, introduced self and role. Patient alert and oriented at time of visit, however is hard of hearing. Patient lives alone at (address correct on face sheet). Patient reports that she is independent with ADL's, however she does not drive. Patient confirmed PCP Dr. Mendes. Patient does not use DME. Patient does not currently have home care/caregiver services.     Patient has home O2, however she cannot recall her baseline liter flow or supplier.     SW notified patient that she will be evaluated by PT/OT, discussed possible [facility]. Patient states that she would not be agreeable to [facility], reports, \"I have already tried that.\"    SW inquired with patient about home health PT as an alternative. Patient reports, \" I am too old for that.\" SW attempted to further discuss home health care. At this time, patient reports that she is only in agreement with a home health RN.     Patient does no further questions.       PLAN: From home alone. PT/OT to eval. Patient is not in agreement with [facility] placement at this time. Patient is not in agreement with home PT at this time. Only agreeable to a home health RN. Tentative home health referrals pending in [place], need to follow up with choice list, orders*    SW will place tentative [facility] referrals/[facility] in the case that patient changes her mind*     Will need  [facility] if patient changes her mind about DAYANNA*    Christina Rodriguez, MSW, LSW    [id number]

## 2024-05-01 NOTE — H&P
[facility]    PATIENT'S NAME: ANDREEA BARTH   ATTENDING PHYSICIAN: Meera Hodgson MD   PATIENT ACCOUNT#:   [id number]    LOCATION:  [place] [address]  MEDICAL RECORD #:   [id number]       YOB: 1926  ADMISSION DATE:       05/01/2024    HISTORY AND PHYSICAL EXAMINATION    CHIEF COMPLAINT:  Urinary tract infection, generalized weakness.    HISTORY OF PRESENT ILLNESS:  Patient is a 89-year-old  female who lives by herself with underlying COPD and pulmonary artery hypertension, was found by her daughter lying on the floor in her house today.  Patient reported that she fell last night.  Did not hit her head, but she could not get up because her legs were giving out.  She decided to lie on the floor until she was found by her daughter this morning.  In the emergency room, urinalysis showed evidence of urinary tract infection.  CBC showed white blood cell count of 12.2 with left shift.  Chemistry showed GFR 37, which is below her baseline.  CK was 175.  B-natriuretic peptide 2779.  Patient had a chest x-ray which showed emphysema with chronic prominent pulmonary interstitium, slight progression of diffuse interstitial prominence may reflect progression of pulmonary interstitial fibrosis versus atypical viral pneumonia.  CT scan of the cervical spine and CT scan of the brain, besides osteoarthritis, no acute findings.  Patient will be admitted to the hospital for further management.      PAST MEDICAL HISTORY:  Chronic obstructive pulmonary disease, emphysema, pulmonary artery hypertension with severe tricuspid regurgitation and possible chronic right-sided heart failure.  She has chronically elevated troponins.  She uses oxygen at home.  Hyperlipidemia, osteoarthritis, and osteoporosis with prior vertebral compression fractures from mechanical falls.      PAST SURGICAL HISTORY:  Appendectomy, bilateral cataract procedure, total hip arthroplasty, tonsillectomy, and basal cell  carcinoma resection.      MEDICATIONS:  Please see medication reconciliation list.     ALLERGIES:  No known drug allergies.     FAMILY HISTORY:  Mother had heart disease.  Father had hypertension and cancer.      SOCIAL HISTORY:  Ex-tobacco user.  Lives by herself.  Uses a walker.  Usually independent for basic activities of daily living, but she is high risk of falling.     REVIEW OF SYSTEMS:  Patient reports that lately she has been feeling weaker.  She cannot give me specific symptoms of dysuria or urine frequency.  No fever or chills.  She does have chronic cough, nonproductive.  She is chronically oxygen dependent and uses oxygen at home.  She uses a rolling walker to ambulate around.  Last night, she fell down and managed to lower herself to the floor and could not get up because her legs were giving out.  Other 12-point review of systems is negative.       PHYSICAL EXAMINATION:    GENERAL:  Alert and oriented to time, place and person.  Seems fatigued but no acute distress.   VITAL SIGNS:  Temperature 97.5, pulse 96, respiratory rate 20, blood pressure 104/78, pulse 94% on 6L nasal cannula oxygen.   HEENT:  Atraumatic.  Oropharynx clear.  Dry mucous membranes.    NECK:  Supple.  No lymphadenopathy.  No jugular venous distention.   LUNGS:  Clear to auscultation bilaterally.  Normal respiratory effort.    HEART:  Regular rate and rhythm.  S1 and S2 auscultated.  Systolic murmur best heard at the apex and right second intercostal space.    ABDOMEN:  Soft, nondistended.  No tenderness.  Positive bowel sounds.   EXTREMITIES:  No edema, clubbing or cyanosis.   NEUROLOGIC:  Motor and sensory intact.  Cranial nerves II to XII are intact.      ASSESSMENT AND PLAN:  1.   Generalized weakness with underlying musculoskeletal deconditioning and chronic lung disease.    2.   Urinary tract infection.    3.   Acute kidney injury.  4.   Elevated troponin of unclear clinical significance.    PLAN:  Patient will be admitted to  general medical floor, remote telemetry.  Continue to trend troponin level.  Gentle hydration.  IV Rocephin.  Fall precautions.  Physical and occupational therapy.   to evaluate patient for possible subacute rehab placement.  Further recommendations to follow.     Dictated By Dangelo Novak MD  d: 05/01/2024 12:24:09  t: 05/01/2024 12:41:42  Job [id number]  FB/

## 2024-05-01 NOTE — ED INITIAL ASSESSMENT (HPI)
Pt to ED via EMS from home c/o possible fall. Pt was found on the floor in her bedroom this morning. Last seen well yesterday at 1600. Pt does use O2 at home and was found with O2 line stretching from kitchen bedroom. Pt alert to her norm. Denies complaints. Per family, patient has been weaker than normal the past few days.

## 2024-05-01 NOTE — ED QUICK NOTES
Orders for admission, patient is aware of plan and ready to go upstairs. Any questions, please call ED RN Ekta at extension [phone].     Patient Covid vaccination status: Fully vaccinated     COVID Test Ordered in ED: SARS-CoV-2/Flu A and B/RSV by PCR (GeneXpert)    COVID Suspicion at Admission: N/A    Running Infusions:  None    Mental Status/LOC at time of transport: A/Ox3-4    Other pertinent information:   CIWA score: N/A   NIH score:  N/A

## 2024-05-01 NOTE — ED PROVIDER NOTES
Patient Seen in: [facility] 3w/sw      History     Chief Complaint   Patient presents with    Fatigue    Fall     Stated Complaint: Fall, initial encounter    Subjective:   HPI    89-year-old female presents via EMS for evaluation after a fall.  Patient was found on the ground, reports she fell last night.  She denies any pain.  She was noted to be hypoxemic for EMS, she wears 5 L nasal cannula at home, had very long oxygen extension tubing at home.  This improved once placed on EMS oxygen.  She reports she has had increased shortness of breath over the past few days to weeks.  No chest pain or pressure, fever, focal weakness or numbness.  Niece at the bedside reports patient seems to been slowing down over the past few weeks.        Objective:   Past Medical History:    Appendicitis    APPENDECTOMY    Arthritis    Basal cell carcinoma    Of Face     Chicken pox    COPD (chronic obstructive pulmonary disease) (HCC)    Left arm numbness    Measles    Other and unspecified hyperlipidemia    Pneumonia    Tonsillitis    Unspecified essential hypertension              No pertinent past surgical history.              No pertinent social history.            Review of Systems    Positive for stated complaint: Fall, initial encounter  Other systems are as noted in HPI.  Constitutional and vital signs reviewed.      All other systems reviewed and negative except as noted above.    Physical Exam     ED Triage Vitals [05/01/24 0958]   /78   Pulse 98   Resp 20   Temp 97.5 °F (36.4 °C)   Temp src Temporal   SpO2 95 %   O2 Device Nasal cannula       Current:BP 94/67 (BP Location: Right arm)   Pulse 89   Temp 97.4 °F (36.3 °C) (Oral)   Resp 18   Ht 165.1 cm (5' 5\")   Wt 51.1 kg   SpO2 93%   BMI 18.75 kg/m²         Physical Exam  Vitals and nursing note reviewed.   Constitutional:       General: She is not in acute distress.     Appearance: She is well-developed.   HENT:      Head: Normocephalic and atraumatic.    Eyes:      Conjunctiva/sclera: Conjunctivae normal.   Cardiovascular:      Rate and Rhythm: Normal rate and regular rhythm.      Heart sounds: Normal heart sounds.   Pulmonary:      Effort: Pulmonary effort is normal. No respiratory distress.      Breath sounds: Normal breath sounds.   Abdominal:      General: Bowel sounds are normal. There is no distension.      Palpations: Abdomen is soft.      Tenderness: There is no abdominal tenderness. There is no guarding or rebound.   Musculoskeletal:         General: Normal range of motion.      Cervical back: Normal range of motion and neck supple.   Skin:     General: Skin is warm and dry.      Findings: No rash.   Neurological:      General: No focal deficit present.      Mental Status: She is alert and oriented to person, place, and time.               ED Course     Labs Reviewed   BASIC METABOLIC PANEL (8) - Abnormal; Notable for the following components:       Result Value    Glucose 133 (*)     BUN 61 (*)     Creatinine 1.32 (*)     BUN/CREA Ratio 46.2 (*)     Calculated Osmolality 303 (*)     eGFR-Cr 37 (*)     All other components within normal limits   CK CREATINE KINASE (NOT CREATININE) - Abnormal; Notable for the following components:     (*)     All other components within normal limits   URINALYSIS WITH CULTURE REFLEX - Abnormal; Notable for the following components:    Clarity Urine Turbid (*)     Protein Urine 50 (*)     Leukocyte Esterase Urine 500 (*)     WBC Urine >50 (*)     RBC Urine 3-5 (*)     Bacteria Urine 3+ (*)     Squamous Epi. Cells Few (*)     Hyaline Casts Present (*)     All other components within normal limits   TROPONIN I HIGH SENSITIVITY - Abnormal; Notable for the following components:    Troponin I (High Sensitivity) 166 (*)     All other components within normal limits   PRO BETA NATRIURETIC PEPTIDE - Abnormal; Notable for the following components:    Beta Natriuretic Peptide 2,779 (*)     All other components within normal  limits   LIPID PANEL - Abnormal; Notable for the following components:    HDL Cholesterol 37 (*)     All other components within normal limits   CBC W/ DIFFERENTIAL - Abnormal; Notable for the following components:    WBC 12.2 (*)     RDW-SD 47.9 (*)     Neutrophil Absolute Prelim 10.33 (*)     Neutrophil Absolute 10.33 (*)     Lymphocyte Absolute 0.71 (*)     Monocyte Absolute 1.01 (*)     All other components within normal limits   SARS-COV-2/FLU A AND B/RSV BY PCR ([employer]ERT) - Normal    Narrative:     This test is intended for the qualitative detection and differentiation of SARS-CoV-2, influenza A, influenza B, and respiratory syncytial virus (RSV) viral RNA in nasopharyngeal or nares swabs from individuals suspected of respiratory viral infection consistent with COVID-19 by their healthcare provider. Signs and symptoms of respiratory viral infection due to SARS-CoV-2, influenza, and RSV can be similar.    Test performed using the Xpert Xpress SARS-CoV-2/FLU/RSV (real time RT-PCR)  assay on the [employer] instrument, [employer], [place] [address].   This test is being used under the Food and Drug Administration's Emergency Use Authorization.    The authorized Fact Sheet for Healthcare Providers for this assay is available upon request from the laboratory.   CBC WITH DIFFERENTIAL WITH PLATELET    Narrative:     The following orders were created for panel order CBC With Differential With Platelet.  Procedure                               Abnormality         Status                     ---------                               -----------         ------                     CBC W/ DIFFERENTIAL[[id number]]          Abnormal            Final result                 Please view results for these tests on the individual orders.   TROPONIN I HIGH SENSITIVITY   URINE CULTURE, ROUTINE     EKG    Rate, intervals and axes as noted on EKG Report.  Rate: 94  Rhythm: Sinus Rhythm  Reading: Sinus rhythm, no STEMI                  Imaging Results Available and Reviewed while in ED:   XR CHEST AP PORTABLE  (CPT=71045)   Final Result   PROCEDURE: XR CHEST AP PORTABLE  (CPT=71045)   TIME: 1051 hours       COMPARISON: [facility], XR CHEST AP PORTABLE (CPT=71045),    3/22/2023, 10:46        INDICATIONS: Hypoxemia.  Shortness of breath COPD       TECHNIQUE:   Single view.         FINDINGS:    CARDIAC/VASC: Borderline cardiomegaly.  Prominent central vasculature.    MEDIAST/CARLOS:   Atherosclerotic aorta with no visible aneurysm.     LUNGS/PLEURA: Emphysematous changes with chronic perihilar basilar    pulmonary interstitium that has progressed.  Findings may reflect acute on    chronic changes.  Progression of interstitial prominence may reflect    progression of interstitial lung disease    versus superimposed atypical viral pneumonia.  Recommend clinical    correlation.  Granulomatous calcifications left lung base.  Minimal    chronic right basilar pleural reaction.   BONES: Mild scoliosis with mild degenerative disc disease and spondylosis.        OTHER: Negative.                     =====   CONCLUSION:    1. Emphysematous changes with chronic prominent pulmonary interstitium.   2. Slight progression of diffuse interstitial prominence may reflect    progression of pulmonary interstitial fibrosis versus superimposed    atypical viral pneumonia.  Recommend clinical correlation.   3. Borderline cardiomegaly.  Prominent central vasculature.               Dictated by (CST): Sky Linn MD on 5/01/2024 at 11:13 AM        Finalized by (CST): Sky Linn MD on 5/01/2024 at 11:16 AM               CT BRAIN OR HEAD (79399)   Final Result   PROCEDURE: CT BRAIN OR HEAD (CPT=70450)       COMPARISON: [facility], CT BRAIN HEAD WO    CONTRAST, 11/25/2011, 5:00 PM.  [facility], CT BRAIN OR    HEAD (CPT=70450), 8/14/2022, 4:13 PM.       INDICATIONS: Fall. Transient alteration of awareness.        TECHNIQUE: CT images were obtained without contrast material. Automated    exposure control for dose reduction was used. Dose information was    transmitted to the ACR (American College of Radiology) NRDR (National    Radiology Data Registry), which includes the    Dose Index Registry.         FINDINGS:    CSF SPACES: The ventricles, cisterns, and sulci are dilated, but remain    commensurate in caliber, consistent with parenchymal volume loss of a    degree that is appropriate for age. No hydrocephalus, subarachnoid    hemorrhage, or effacement of the basal    cisterns is appreciated. There is no extra-axial fluid collection.    CEREBRUM: No acute intraparenchymal hemorrhage, edema, or cortical sulcal    effacement is apparent. There is no space-occupying lesion, mass effect,    or shift of midline structures. The gray-white matter junction is    preserved and bilaterally symmetric in    appearance.    Scattered periventricular and subcortical white matter hypodensities are    present, consistent with chronic microvascular ischemic disease.   CEREBELLUM: No edema, hemorrhage, mass, or acute infarction is seen. There    is cerebellar folial expansion consistent with atrophy.     BRAINSTEM: Patchy areas of low attenuation likely reflect sequela of    chronic small vessel ischemic disease. No edema, hemorrhage, mass, or    acute infarction is seen. There is commensurate atrophy.     CALVARIUM: There is no apparent depressed fracture, mass, or other    significant visible lesion.     SINUSES: Limited views demonstrate diffuse mucosal thickening and/or fluid    of the maxillary sinuses bilaterally. Sclerosis and wall thickening likely    reflect chronic mucoperiosteal reaction. Extensive aerated secretions are    seen throughout the frontal    sinuses, extending to the frontoethmoidal recesses. Scattered mucosal    thickening and aerated secretions of the ethmoid air cells and bilateral    sphenoid sinuses are  seen.     ORBITS: Limited views are notable for postoperative changes of the lenses    bilaterally.       OTHER: Atherosclerotic vascular calcifications are perceived in the    cavernous carotid and distal vertebral arteries. Extensive filling defects    are present in the right external auditory canal, likely reflecting    impacted cerumen. Extensive dental amalgam    is seen on the  view.                         =====   CONCLUSION:    1. Negative for depressed calvarial fracture, coup/contrecoup    intraparenchymal contusion, intracranial hemorrhage, or further evidence    of acute intracranial process by noncontrast CT technique.       2. Senescent changes of parenchymal volume loss with sequela of chronic    microvascular ischemic disease.        3. There is also large vessel atherosclerosis involving the anterior and    posterior intracranial circulations.       4. Extensive paranasal sinus disease is apparent.       5. Lesser incidental findings as above.                  Dictated by (CST): Vitaliy Tran MD on 5/01/2024 at 11:07 AM        Finalized by (CST): Vitaliy Tran MD on 5/01/2024 at 11:09 AM               CT SPINE CERVICAL (CPT=72125)   Final Result   PROCEDURE: CT SPINE CERVICAL (CPT=72125)       COMPARISON: [facility], [place], CT SPINE CERVICAL (CPT=72125),    8/14/2022, 4:13 PM.       INDICATIONS: Fall with posttraumatic neck pain.       TECHNIQUE: Multidetector CT images of the cervical spine were obtained    without the infusion of non-ionic intravenous contrast material. Automated    exposure control for dose reduction was used. Adjustment of the mA and/or    kV was done based on the patient's    size. Iterative reconstruction technique for dose reduction was employed.    Dose information was transmitted to the ACR (American College of    Radiology) NRDR (National Radiology Data Registry), which includes the    Dose Index Registry.         FINDINGS:   ALIGNMENT: The anatomic  cervical lordosis is straightened with trace    anterolisthesis of C2 on C3 and C3 on C4, as well as minimal    anterolisthesis of C5 on C6.   BONES: No fractures or osseous lesions are apparent. Multilevel anterior    osteophyte formation is demonstrated.   CRANIOCERVICAL AREA: Normal foramen magnum without Chiari malformation.     CERVICAL DISC LEVELS: There is degeneration between the anterior arch of    C1 and the odontoid process. There is no rotational abnormality of the    atlantoaxial articulation. There is posterior disc osteophyte complex    formation at C4-C5, C5-C6, and C6-C7    with prominent uncovertebral hypertrophy. Significant degeneration is seen    at C2-C3 and C3-C4 with associated uncovertebral joint hypertrophy.    Multilevel facet arthrosis is demonstrated.   PARASPINAL AREA: No visible mass.     OTHER: There is no visible swelling of the prevertebral soft tissues.    Atherosclerotic vascular calcifications of the carotid bifurcations are    seen. Background parenchymal findings of severe upper lobe predominant    centrilobular emphysema are noted. Right    pleural effusion is demonstrated.                       =====   CONCLUSION:    1. No acute fracture or posttraumatic malalignment of the cervical spine    is demonstrated.       2. Substantial multilevel degenerative changes of cervical spine are    evident.       3. Partially visualized severe centrilobular emphysema.       4. Incompletely delineated right pleural effusion.       5. Lesser incidental findings as above.               Dictated by (CST): Vitaliy Tran MD on 5/01/2024 at 11:11 AM        Finalized by (CST): Vitaliy Tran MD on 5/01/2024 at 11:13 AM                   ED Medications Administered:   Medications   cefTRIAXone (Rocephin) 1 g in D5W 100 mL IVPB-ADD (1 g Intravenous New Bag 5/1/24 7050)   heparin (Porcine) 5000 UNIT/ML injection 5,000 Units (has no administration in time range)   acetaminophen (Tylenol Extra  Strength) tab 500 mg (has no administration in time range)   guaiFENesin (Robitussin) 100 MG/5 ML oral liquid 200 mg (has no administration in time range)   sodium chloride 0.9% infusion ( Intravenous New Bag 5/1/24 1524)   cefTRIAXone (Rocephin) 1 g in D5W 100 mL IVPB-ADD (0 g Intravenous Stopped 5/1/24 1348)       Vitals:    05/01/24 1400 05/01/24 1430 05/01/24 1508 05/01/24 1518   BP: 106/82 107/85 94/67    BP Location:   Right arm    Pulse: 93 89     Resp: 18 18     Temp:   97.4 °F (36.3 °C)    TempSrc:   Oral    SpO2: 93% 93%     Weight:    51.1 kg   Height:         *I personally reviewed and interpreted all ED vitals.           MDM        Admission disposition: 5/1/2024 11:48 AM                                        Medical Decision Making  Differential diagnosis includes but is not limited to intracranial hemorrhage, anemia, electrolyte imbalance, dehydration, malignancy, pneumothorax, pneumonia    Patient with evidence of heart failure, troponin elevated but decreased compared to prior as is CK.  BNP is elevated.  Blood pressure at the low end of normal, Lasix held, she is saturating normally on home O2 now.  Discussed with and admitted to hospitalist.  Discussed with cardiology, no further recommendations for the emergency department.    Problems Addressed:  Acute on chronic respiratory failure with hypoxia (HCC): chronic illness or injury with exacerbation, progression, or side effects of treatment  Fall, initial encounter: acute illness or injury  Heart failure, unspecified HF chronicity, unspecified heart failure type (HCC): acute illness or injury    Amount and/or Complexity of Data Reviewed  Independent Historian: EMS  External Data Reviewed: labs.     Details: Mild leukocytosis, increase in creatinine, otherwise CBC and BMP stable compared to labs from 3/22/2023  Labs: ordered.  Radiology: ordered and independent interpretation performed.     Details: CT brain images reviewed, no obvious intracranial  hemorrhage, other incidental findings deferred to radiology  ECG/medicine tests: ordered and independent interpretation performed. Decision-making details documented in ED Course.  Discussion of management or test interpretation with external provider(s): Discussed with hospitalist and cardiology        Disposition and Plan     Clinical Impression:  1. Heart failure, unspecified HF chronicity, unspecified heart failure type (HCC)    2. Fall, initial encounter    3. Acute on chronic respiratory failure with hypoxia (HCC)         Disposition:  Admit  5/1/2024 11:48 am    Follow-up:  No follow-up provider specified.  We recommend that you schedule follow up care with a primary care provider within the next three months to obtain basic health screening including reassessment of your blood pressure.      Medications Prescribed:  Current Discharge Medication List                            Hospital Problems       Present on Admission  Date Reviewed: 6/29/2023            ICD-10-CM Noted POA    * (Principal) Heart failure, unspecified HF chronicity, unspecified heart failure type (HCC) I50.9 5/1/2024 Unknown    Acute on chronic respiratory failure with hypoxia (HCC) J96.21 5/1/2024 Unknown    Fall, initial encounter W19.XXXA 8/14/2022 Unknown

## 2024-05-01 NOTE — CONSULTS
[facility]  part of [facility]    Cardiology Consultation    Lina Dudley Patient Status:  Emergency    1926 MRN [id number]   Location [facility] EMERGENCY DEPARTMENT Attending Meera Hodgson MD   Hosp Day # 0 PCP ROBYN KULKARNI MD     Date of Admission:  2024  Date of Consult:  2024  Reason for Consultation:   COPD  QUITA  Elevated troponins    History of Present Illness:   Patient is a 89 year old female with history of COPD on home oxygen for many years, pulmonary hypertension severe tricuspid regurgitation who was brought to the ED today by her family after being found on floor in the morning.  Patient states that while trying to go to bed last night her legs gave way and she came to lie on the floor and could not get up.  The patient was found to be lying on floor by her niece.  Denies any chest pain or loss of consciousness  In the ED, UA showed urinary tract infection with CBC showing leftward shift.   Her BNP was 2779.    ED Workup:  EKG: NSR LAHB APCs  CXR: Borderline cardiomegaly possible atypical viral pneumonia  Troponin I: 166  BNP:2779  Bun/ Creat 61/ 1.32      Past Medical History  Past Medical History:    Appendicitis    APPENDECTOMY    Arthritis    Basal cell carcinoma    Of Face     Chicken pox    COPD (chronic obstructive pulmonary disease) (HCC)    Left arm numbness    Measles    Other and unspecified hyperlipidemia    Pneumonia    Tonsillitis    Unspecified essential hypertension     Past Surgical History  Past Surgical History:   Procedure Laterality Date    Appendectomy      Cataract extraction Bilateral     Dr. Tirado's office    Glaucoma surgery      Hip replacement surgery      Skin surgery  Excision of Basal cell carcinoma of face     Tonsillectomy      T&A     Family History  Family History   Problem Relation Age of Onset    Cancer Father     Hypertension Father     Heart Disease Father     Heart Disease Mother      Gastro-Intestinal Disorder Maternal Grandmother         TUBERCULOSIS    Breast Cancer Maternal Aunt         80s    Neurological Disorder Other         GREAT AUNT, EPILEPSY    Lipids Other         HYPERLIPIDEMIA    Glaucoma Neg     Diabetes Neg      Social History  Pediatric History   Patient Parents    Not on file     Other Topics Concern     Service Not Asked    Blood Transfusions Not Asked    Caffeine Concern Yes     Comment: COFFEE 3 CUPS/DAY    Occupational Exposure Not Asked    Hobby Hazards Not Asked    Sleep Concern Not Asked    Stress Concern Not Asked    Weight Concern Not Asked    Special Diet Not Asked    Back Care Not Asked    Exercise Not Asked    Bike Helmet Not Asked    Seat Belt Not Asked    Self-Exams Not Asked   Social History Narrative    Not on file         Current Medications:  No current facility-administered medications for this encounter.     (Not in a hospital admission)    Allergies  Allergies   Allergen Reactions    Bacitracin UNKNOWN    Neosporin Original [Neomycin-Bacitracin-Polymyxin] ITCHING    Polymyxin B UNKNOWN       Review of Systems:   ROS  10 point review of systems completed and negative except as noted.    Physical Exam:   Patient Vitals for the past 24 hrs:   BP Temp Temp src Pulse Resp SpO2 Height Weight   05/01/24 1400 106/82 -- -- 93 18 93 % -- --   05/01/24 1330 110/80 -- -- 88 18 93 % -- --   05/01/24 1315 110/83 -- -- 94 -- 92 % -- --   05/01/24 1230 111/88 -- -- 87 -- 94 % -- --   05/01/24 1130 107/75 -- -- 96 20 94 % -- --   05/01/24 1115 111/77 -- -- 58 -- 94 % -- --   05/01/24 0958 104/78 97.5 °F (36.4 °C) Temporal 98 20 95 % 5' 5\" (1.651 m) 120 lb (54.4 kg)     Intake/Output:   Last 3 shifts:   Intake/Output                   04/29/24 0700 - 04/30/24 0659 (Not Admitted) 04/30/24 0700 - 05/01/24 0659 (Not Admitted) 05/01/24 0700 - 05/02/24 0659       Intake    IV PIGGYBACK  --  --  100    Volume (mL) (cefTRIAXone (Rocephin) 1 g in D5W 100 mL IVPB-ADD) -- --  100    Total Intake -- -- 100       Output    Urine  --  --  300    Intermittent/Straight Cath (mL) -- -- 300    Total Output -- -- 300       Net I/O     -- -- -200          Vent Settings:    Hemodynamic parameters (last 24 hours):    Scheduled Meds:   Continuous Infusions:   Physical Exam:  General: Alert and oriented x 3. No apparent distress.   HEENT: Normocephalic, anicteric sclera, neck supple, no thyromegaly or adenopathy.  Neck: No JVD, carotids 2+, no bruits.  Cardiac: Regular rate and rhythm. S1, S2 normal. No murmur, pericardial rub, S3, or extra cardiac sounds.  Lungs: Clear without wheezes, rales, rhonchi or dullness.  Normal excursions and effort.  Abdomen: Soft, non-tender. No organosplenomegally, mass or rebound, BS-present.  Extremities: Without clubbing or cyanosis. No edema.    Neurologic: Alert and oriented, normal affect. No focal defects  Skin: Warm and dry.     Results:   Laboratory Data:  Lab Results   Component Value Date    WBC 12.2 (H) 05/01/2024    HGB 15.6 05/01/2024    HCT 45.7 05/01/2024    .0 05/01/2024    CREATSERUM 1.32 (H) 05/01/2024    BUN 61 (H) 05/01/2024     05/01/2024    K 4.9 05/01/2024     05/01/2024    CO2 24.0 05/01/2024     (H) 05/01/2024    CA 9.3 05/01/2024    ALB 2.4 (L) 08/19/2022    ALKPHO 79 08/19/2022    TP 5.4 (L) 08/19/2022    AST 20 08/19/2022    ALT 34 08/19/2022    TSH 3.47 08/28/2018     (H) 05/01/2024         Recent Labs   Lab 05/01/24  1014   *   BUN 61*   CREATSERUM 1.32*   CA 9.3      K 4.9      CO2 24.0     Recent Labs   Lab 05/01/24  1014   RBC 4.74   HGB 15.6   HCT 45.7   MCV 96.4   MCH 32.9   MCHC 34.1   RDW 13.9   NEPRELIM 10.33*   WBC 12.2*   .0       Recent Labs   Lab 05/01/24  1014   BNPML 2,779*       Recent Labs   Lab 05/01/24  1014   *       Imaging:  XR CHEST AP PORTABLE  (CPT=71045)    Result Date: 5/1/2024  CONCLUSION:  1. Emphysematous changes with chronic prominent pulmonary  interstitium. 2. Slight progression of diffuse interstitial prominence may reflect progression of pulmonary interstitial fibrosis versus superimposed atypical viral pneumonia.  Recommend clinical correlation. 3. Borderline cardiomegaly.  Prominent central vasculature.    Dictated by (CST): Sky Linn MD on 5/01/2024 at 11:13 AM     Finalized by (CST): Sky Linn MD on 5/01/2024 at 11:16 AM          CT SPINE CERVICAL (CPT=72125)    Result Date: 5/1/2024  CONCLUSION:  1. No acute fracture or posttraumatic malalignment of the cervical spine is demonstrated.  2. Substantial multilevel degenerative changes of cervical spine are evident.  3. Partially visualized severe centrilobular emphysema.  4. Incompletely delineated right pleural effusion.  5. Lesser incidental findings as above.    Dictated by (CST): Vitaliy Tran MD on 5/01/2024 at 11:11 AM     Finalized by (CST): Vitaliy Tran MD on 5/01/2024 at 11:13 AM          CT BRAIN OR HEAD (02106)    Result Date: 5/1/2024  CONCLUSION:  1. Negative for depressed calvarial fracture, coup/contrecoup intraparenchymal contusion, intracranial hemorrhage, or further evidence of acute intracranial process by noncontrast CT technique.  2. Senescent changes of parenchymal volume loss with sequela of chronic microvascular ischemic disease.  3. There is also large vessel atherosclerosis involving the anterior and posterior intracranial circulations.  4. Extensive paranasal sinus disease is apparent.  5. Lesser incidental findings as above.      Dictated by (CST): Vitaliy Tran MD on 5/01/2024 at 11:07 AM     Finalized by (CST): Vitaliy Tran MD on 5/01/2024 at 11:09 AM           Assessment:     Severe COPD/emphysema/ pulmonary hypertension on home oxygen  Acute on chronic CHF  BNP 2776    Chronically Elevated Troponin I   No CP Or SoB  Troponin I 166  EKG without any change    UTI  Abx per ID    QUITA on Ckd    Generalized weakness and  deconditioning    Plan:  Asa 81 mg po daily  Atorvastatin  Gentle diuresis  Trend Troponin I  Echo & doppler  Abx per ID  Dvt prophylaxis      Thank you for allowing me to participate in the care of your patient.    Van Nix MD  [facility]  5/1/2024    Level C 5

## 2024-05-02 ENCOUNTER — APPOINTMENT (OUTPATIENT)
Dept: CV DIAGNOSTICS | Facility: HOSPITAL | Age: 89
End: 2024-05-02
Attending: INTERNAL MEDICINE

## 2024-05-02 ENCOUNTER — APPOINTMENT (OUTPATIENT)
Dept: CV DIAGNOSTICS | Facility: HOSPITAL | Age: 89
DRG: 291 | End: 2024-05-02
Attending: INTERNAL MEDICINE

## 2024-05-02 PROBLEM — Z71.89 GOALS OF CARE, COUNSELING/DISCUSSION: Status: ACTIVE | Noted: 2024-01-01

## 2024-05-02 PROBLEM — Z71.89 ADVANCE CARE PLANNING: Status: ACTIVE | Noted: 2024-01-01

## 2024-05-02 PROBLEM — Z51.5 PALLIATIVE CARE BY SPECIALIST: Status: ACTIVE | Noted: 2024-01-01

## 2024-05-02 PROBLEM — Z71.89 ADVANCE CARE PLANNING: Status: ACTIVE | Noted: 2024-05-02

## 2024-05-02 PROBLEM — Z71.89 GOALS OF CARE, COUNSELING/DISCUSSION: Status: ACTIVE | Noted: 2024-05-02

## 2024-05-02 PROBLEM — Z51.5 PALLIATIVE CARE BY SPECIALIST: Status: ACTIVE | Noted: 2024-05-02

## 2024-05-02 NOTE — PLAN OF CARE
Pt continued on IV fluids and abx. Remains on 4L O2 NC. Trops tending down. Incontinent w/ purewick.     Call light within reach, safety precautions in place  Problem: CARDIOVASCULAR - ADULT  Goal: Maintains optimal cardiac output and hemodynamic stability  Description: INTERVENTIONS:  - Monitor vital signs, rhythm, and trends  - Monitor for bleeding, hypotension and signs of decreased cardiac output  - Evaluate effectiveness of vasoactive medications to optimize hemodynamic stability  - Monitor arterial and/or venous puncture sites for bleeding and/or hematoma  - Assess quality of pulses, skin color and temperature  - Assess for signs of decreased coronary artery perfusion - ex. Angina  - Evaluate fluid balance, assess for edema, trend weights  Outcome: Progressing  Goal: Absence of cardiac arrhythmias or at baseline  Description: INTERVENTIONS:  - Continuous cardiac monitoring, monitor vital signs, obtain 12 lead EKG if indicated  - Evaluate effectiveness of antiarrhythmic and heart rate control medications as ordered  - Initiate emergency measures for life threatening arrhythmias  - Monitor electrolytes and administer replacement therapy as ordered  Outcome: Progressing     Problem: RESPIRATORY - ADULT  Goal: Achieves optimal ventilation and oxygenation  Description: INTERVENTIONS:  - Assess for changes in respiratory status  - Assess for changes in mentation and behavior  - Position to facilitate oxygenation and minimize respiratory effort  - Oxygen supplementation based on oxygen saturation or ABGs  - Provide Smoking Cessation handout, if applicable  - Encourage broncho-pulmonary hygiene including cough, deep breathe, Incentive Spirometry  - Assess the need for suctioning and perform as needed  - Assess and instruct to report SOB or any respiratory difficulty  - Respiratory Therapy support as indicated  - Manage/alleviate anxiety  - Monitor for signs/symptoms of CO2 retention  Outcome: Progressing     Problem:  PAIN - ADULT  Goal: Verbalizes/displays adequate comfort level or patient's stated pain goal  Description: INTERVENTIONS:  - Encourage pt to monitor pain and request assistance  - Assess pain using appropriate pain scale  - Administer analgesics based on type and severity of pain and evaluate response  - Implement non-pharmacological measures as appropriate and evaluate response  - Consider cultural and social influences on pain and pain management  - Manage/alleviate anxiety  - Utilize distraction and/or relaxation techniques  - Monitor for opioid side effects  - Notify MD/LIP if interventions unsuccessful or patient reports new pain  - Anticipate increased pain with activity and pre-medicate as appropriate  Outcome: Progressing     Problem: SAFETY ADULT - FALL  Goal: Free from fall injury  Description: INTERVENTIONS:  - Assess pt frequently for physical needs  - Identify cognitive and physical deficits and behaviors that affect risk of falls.  - Perry fall precautions as indicated by assessment.  - Educate pt/family on patient safety including physical limitations  - Instruct pt to call for assistance with activity based on assessment  - Modify environment to reduce risk of injury  - Provide assistive devices as appropriate  - Consider OT/PT consult to assist with strengthening/mobility  - Encourage toileting schedule  Outcome: Progressing     Problem: DISCHARGE PLANNING  Goal: Discharge to home or other facility with appropriate resources  Description: INTERVENTIONS:  - Identify barriers to discharge w/pt and caregiver  - Include patient/family/discharge partner in discharge planning  - Arrange for needed discharge resources and transportation as appropriate  - Identify discharge learning needs (meds, wound care, etc)  - Arrange for interpreters to assist at discharge as needed  - Consider post-discharge preferences of patient/family/discharge partner  - Complete POLST form as appropriate  - Assess patient's  ability to be responsible for managing their own health  - Refer to Case Management Department for coordinating discharge planning if the patient needs post-hospital services based on physician/LIP order or complex needs related to functional status, cognitive ability or social support system  Outcome: Progressing

## 2024-05-02 NOTE — OCCUPATIONAL THERAPY NOTE
OCCUPATIONAL THERAPY EVALUATION - INPATIENT     Room Number: 336/336-A  Evaluation Date: 5/2/2024  Type of Evaluation: Initial       Physician Order: IP Consult to Occupational Therapy  Reason for Therapy: ADL/IADL Dysfunction and Discharge Planning    OCCUPATIONAL THERAPY ASSESSMENT     Patient is a 89 year old female admitted 5/1/2024 for CHF; intestinal lung disease; chronic O2 use.  Prior to admission, pt was independent; more difficulty with self-care lately.  Patient is currently requiring up to max A for ADLs overall along with mod A for sit to supine and STS and for functional transfer at  level. Pt tolerated about <1 minutes of standing in supported standing.  Pt has the following impairments: decreased functional strength, decreased functional reach, decreased endurance, medical status, decreased insight to deficits, and decreased safety awareness.    RN cleared pt for participation in OT session, which was completed in collaboration with PT. Upon arrival, pt was seated in bedside chair  and agreeable to activity. Aunt present during session. Gait belt used. Pt was left in bed and alarm was activated. Call light and all needs left in reach. Handoff given to RN.    Education provided  Educated pt about role of OT and hospital therapy process as well as proper safety techniques, deep breathing techniques, and benefits of rehab. Pt verbalized/demonstrated fair carryover.    Patient will benefit from continued skilled OT Services to promote return to prior level of function and safety with continuous assistance and gradual rehabilitative therapy     PLAN  OT Treatment Plan: Balance activities;Energy conservation/work simplification techniques;Continued evaluation;Compensatory technique education;Fine motor coordination activities;Neuromuscluar reeducation;Equipment eval/education;Patient/Family training;Patient/Family education;Cognitive reorientation;Endurance training;UE strengthening/ROM;Functional  transfer training;Visual perceptual training;IADL training;ADL training      OCCUPATIONAL THERAPY MEDICAL/SOCIAL HISTORY     Problem List  Principal Problem:    Heart failure, unspecified HF chronicity, unspecified heart failure type (HCC)  Active Problems:    Fall, initial encounter    Acute on chronic respiratory failure with hypoxia (HCC)    Goals of care, counseling/discussion    Advance care planning    Palliative care by specialist      Past Medical History  Past Medical History:    Appendicitis    APPENDECTOMY    Arthritis    Basal cell carcinoma    Of Face     Chicken pox    COPD (chronic obstructive pulmonary disease) (HCC)    Left arm numbness    Measles    Other and unspecified hyperlipidemia    Pneumonia    Tonsillitis    Unspecified essential hypertension       Past Surgical History  Past Surgical History:   Procedure Laterality Date    Appendectomy      Cataract extraction Bilateral     Dr. Tirado's office    Glaucoma surgery      Hip replacement surgery      Skin surgery  Excision of Basal cell carcinoma of face     Tonsillectomy      T&A       HOME SITUATION  Type of Home: House  Home Layout: Multi-level  Lives With: Alone                      Drives: No  Patient Regularly Uses: Home O2 (5L)    SUBJECTIVE  \"I have to use the bathroom.\"    OCCUPATIONAL THERAPY EXAMINATION      OBJECTIVE  Precautions: Cardiac;Hard of hearing  Fall Risk: High fall risk    PAIN ASSESSMENT  Ratin          O2 SATURATIONS  Oxygen Therapy  SPO2% on Oxygen at Rest: 90  At rest oxygen flow (liters per minute): 4  SPO2% Ambulation on Oxygen: 84  Ambulation oxygen flow (liters per minute): 5       RANGE OF MOTION   Upper extremity ROM is within functional limits     STRENGTH ASSESSMENT  Upper extremity strength is within functional limits     COORDINATION  Gross Motor: WFL   Fine Motor: WFL     ACTIVITIES OF DAILY LIVING ASSESSMENT  AM-PAC ‘6-Clicks’ Inpatient Daily Activity Short Form  How much help from another person does  the patient currently need…  -   Putting on and taking off regular lower body clothing?: A Lot  -   Bathing (including washing, rinsing, drying)?: A Lot  -   Toileting, which includes using toilet, bedpan or urinal? : A Lot  -   Putting on and taking off regular upper body clothing?: A Little  -   Taking care of personal grooming such as brushing teeth?: A Little  -   Eating meals?: None    AM-PAC Score:  Score: 16  Approx Degree of Impairment: 53.32%  Standardized Score (AM-PAC Scale): 35.96  CMS Modifier (G-Code): CK      BED MOBILITY  Sit to Supine (OT): Moderate Assist      FUNCTIONAL TRANSFER ASSESSMENT        Sit to stand: mod A  Toilet Transfer: mod A  Chair Transfer: mod A    FUNCTIONAL ADL ASSESSMENT  Overall max A    The patient's Approx Degree of Impairment: 53.32% has been calculated based on documentation in the [facility] '6 clicks' Inpatient Daily Activity Short Form.  Research supports that patients with this level of impairment may benefit from DAYANNA. Final disposition will be made by interdisciplinary medical team.    OT Goals  Patients self stated goal is: to get stronger     Patient will complete functional transfer with SBA  Comment:     Patient will complete toileting with min A  Comment:     Patient will tolerate standing for 1 minutes in prep for adls with SBA   Comment:    Patient will complete item retrieval with SBA  Comment:          Goals  on:   Frequency: 3-5x/wk    Patient Evaluation Complexity Level:   Occupational Profile/Medical History MODERATE - Expanded review of history including review of medical or therapy record   Specific performance deficits impacting engagement in ADL/IADL MODERATE  3 - 5 performance deficits   Client Assessment/Performance Deficits MODERATE - Comorbidities and min to mod modifications of tasks    Clinical Decision Making MODERATE - Analysis of occupational profile, detailed assessments, several treatment options    Overall Complexity MODERATE     OT  Session Time: 45 minutes  Self-Care Home Management: 23 minutes           Beverley Mcghee OT  [facility]  Inpatient Rehabilitation  Occupational Therapy  [phone]

## 2024-05-02 NOTE — PROGRESS NOTES
Progress Note     Lina Dudley Patient Status:  Inpatient    1926 MRN [id number]   Location [facility] 3W/SW Attending Demi Arthur MD   Hosp Day # 1 PCP ROBYN KULKARNI MD     Chief Complaint: weakness     Subjective:   S: Patient here with weakness and fall  Hard of hearing but a+o x 3  Denies cp  + sob  Has been requiring more oxgyen  Has had several falls at home    Review of Systems:   10 point ROS completed and was negative, except for pertinent positive and negatives stated in subjective.    Objective:   Vital signs:  Temp:  [97.4 °F (36.3 °C)-97.7 °F (36.5 °C)] 97.7 °F (36.5 °C)  Pulse:  [87-96] 89  Resp:  [18-20] 20  BP: ()/(67-88) 108/77  SpO2:  [92 %-96 %] 93 %    Wt Readings from Last 6 Encounters:   24 112 lb 11.2 oz (51.1 kg)   23 105 lb (47.6 kg)   23 116 lb (52.6 kg)   23 124 lb 9.6 oz (56.5 kg)   23 124 lb 11.2 oz (56.6 kg)   23 124 lb 6.4 oz (56.4 kg)         Physical Exam:    General: No acute distress. Alert ,         Respiratory: Clear to auscultation bilaterally. No wheezes. No rhonchi.  Cardiovascular: S1, S2. Regular rate and rhythm. No murmurs, rubs or gallops.   Abdomen: Soft, nontender, nondistended.  Positive bowel sounds. No rebound or guarding.  Neurologic: No focal neurological deficits.   Musculoskeletal: Moves all extremities.  Extremities: No edema.    Results:   Diagnostic Data:      Labs:    Labs Last 24 Hours:   BMP     CBC    Other     Na 136 Cl 104 BUN 67 Glu 136   Hb 14.9   PTT - Procal -   K 4.1 CO2 25.0 Cr 1.23   WBC 10.1 >< .0  INR - CRP -   Renal Lytes Endo    Hct 45.9   Trop - D dim -   eGFR - Ca 9.0 POC Gluc  -    LFT   pBNP - Lactic -   eGFR AA - PO4 - A1c -   AST - APk - Prot -  LDL -     Mg - TSH -   ALT - T briseida - Alb -        COVID-19 Lab Results    COVID-19  Lab Results   Component Value Date    COVID19 Not Detected 2024    COVID19 Not Detected 2023    COVID19 Not Detected  08/17/2022       Pro-Calcitonin  No results for input(s): \"PCT\" in the last 168 hours.    Cardiac  No results for input(s): \"TROP\", \"PBNP\" in the last 168 hours.    Creatinine Kinase  Recent Labs   Lab 05/01/24  1014   *       Inflammatory Markers  No results for input(s): \"CRP\", \"ANASTASIIA\", \"LDH\", \"DDIMER\" in the last 168 hours.    Imaging: Imaging data reviewed in Epic.    Medications:    cefTRIAXone  1 g Intravenous Q24H    heparin  5,000 Units Subcutaneous 2 times per day    atorvastatin  10 mg Oral Nightly       Assessment & Plan:   ASSESSMENT / PLAN:     #Weakness   #UTI  -started iv rocephin--> switch to iv cefazolin  -follow-up cultures  -pt/ot will need rheab    #QUITA  -creatinine baseline ~0.6-->1.32-->1.23  -not on fluids with elevated bnp and fluid overload  -cautious diuresis    #ILD  -no requiring more oxygen  -plan pulm eval    #Ch. Elevated Troponin  # Fluid overload  -BNP elevated at 2779  -repeat echo  -Neurology consultation appreciated    HL          Quality:  DVT Prophylaxis: Heparin subcu  CODE status: DNR select  [place]:    Central line: n/a  Dispo: further recs pending clinical course      Will the patient be referred to TCC on discharge?: yes  Estimated date of discharge: ~2-3 days  Discharge is dependent on: clinical improvement  At this point Ms. Dudley is expected to be discharge to: rehab    Plan of care discussed with patient, niece, nursing    Outpatient records or previous hospital records reviewed.   Patient and/or patient's family given opportunity to ask questions and note understanding and agreeing with therapeutic plan as outlined     Coordinated care with providers and counseling re: treatment plan and workup    MDM: High complexity     BAUTISTA LUND MD    Supplementary Documentation:         **Certification      PHYSICIAN Certification of Need for Inpatient Hospitalization - Initial Certification    Patient will require inpatient services that will reasonably be expected to  span two midnight's based on the clinical documentation in H+P.   Based on patients current state of illness, I anticipate that, after discharge, patient will require TBD.

## 2024-05-02 NOTE — CONSULTS
[facility]  part of [facility]  Palliative Care Initial Consult Note    Lina Dudley Patient Status:  Inpatient    1926 MRN [id number]   Location [facility] 3W/SW Attending Demi Arthur MD   Hosp Day # 1 PCP ROBYN KULKARNI MD     Date of Consult: 2024  Patient seen at: [facility] Inpatient    The  Cures Act makes medical notes like these available to patients in the interest of transparency. Please be advised this is a medical document. Medical documents are intended to carry relevant information, facts as evident, and the clinical opinion of the practitioner. The medical note is intended as peer to peer communication and may appear blunt or direct. It is written in medical language and may contain abbreviations or verbiage that are unfamiliar.     Reason for Consultation: Consult ordered by:: Dr. Arthur for evaluation of Palliative Care needs and Goals of care discussion.    Subjective     History of Present Illness: Lina Dudley is a 89 year old female with history of COPD with chronic respiratory failure who was admitted on 2024 for s/p fall and weakness. See below for reviewed labs and imaging. Pt was admitted for treatment and evaluation of UTI, QUITA, elevated troponin and weakness. See below for reviewed imaging.     She is being followed by cardiology services.    She has a history of COPD with chronic respiratory failure on home O2 5L at baseline, HLD, severe TR, OA, pulmonary HTN, and h/o compression fractures.  Reviewed labs and imaging. History was obtained from Epic and pt/niece Christina by phone.  Today is day 1 of hospitalization. Pt denies any other recent hospitalizations.    Pt has no code status listed or any advance directives on file in EPIC.    Reviewed symptom needs past 24 hrs: tylenol 500 mg po x1    Patient was seen and examined in bed with no family at bedside. Pt is alert and oriented to person/place/situation, +mild  forgetfulness noted. +[place]. Pt appears very frail and thin. She reports chronic dyspnea symptoms with activity r/t her COPD, she does not remember how much O2 she wears at home, but niece reports 5L at baseline. Pt denies pain issues. She reports poor appetite, denies abdominal pain, n/v and moved her bowels today. +occasional issues with constipation. See physical exam and ROS below.    Review of Systems/Palliative Care symptom needs assessed:   Pertinent items are noted in HPI/below    Fatigue:  Yes  Dyspnea: +chronic dyspnea with exertion   Current O2 therapy: nc5L  Cough: denies  Pain Present: denies  Non-verbal signs of pain present: NO  Appetite: poor  Constipation: +occasional issues  Diarrhea: denies  Nausea/Vomiting: denies  Depression: denies  Anxiety: denies    Medical History: obtained from [employer]  Past Medical History:    Appendicitis    APPENDECTOMY    Arthritis    Basal cell carcinoma    Of Face     Chicken pox    COPD (chronic obstructive pulmonary disease) (HCC)    Left arm numbness    Measles    Other and unspecified hyperlipidemia    Pneumonia    Tonsillitis    Unspecified essential hypertension     Past Surgical History:   Procedure Laterality Date    Appendectomy      Cataract extraction Bilateral     Dr. Tirado's office    Glaucoma surgery      Hip replacement surgery      Skin surgery  Excision of Basal cell carcinoma of face     Tonsillectomy      T&A       Family History: obtained from [facility]  Family History   Problem Relation Age of Onset    Cancer Father     Hypertension Father     Heart Disease Father     Heart Disease Mother     Gastro-Intestinal Disorder Maternal Grandmother         TUBERCULOSIS    Breast Cancer Maternal Aunt         80s    Neurological Disorder Other         GREAT AUNT, EPILEPSY    Lipids Other         HYPERLIPIDEMIA    Glaucoma Neg     Diabetes Neg        Palliative Care Social History:   Marital Status: single  Children: none  Living Situation Prior to Admit: lives  alone  Does Patient Live Alone: Yes  Occupational History: Retired,     Substance History:   reports that she quit smoking about 34 years ago. Her smoking use included cigarettes. She started smoking about 64 years ago. She has a 30 pack-year smoking history. She has quit using smokeless tobacco.  reports no history of alcohol use.  reports no history of drug use.  Hx of Substance Use/Abuse: No    Spiritual Assessment:   Yazdanism: Mandaeism   requested: declined    Allergies:  Allergies   Allergen Reactions    Bacitracin UNKNOWN    Neosporin Original [Neomycin-Bacitracin-Polymyxin] ITCHING    Polymyxin B UNKNOWN       Medications:     Current Facility-Administered Medications:     cefTRIAXone (Rocephin) 1 g in D5W 100 mL IVPB-ADD, 1 g, Intravenous, Q24H    heparin (Porcine) 5000 UNIT/ML injection 5,000 Units, 5,000 Units, Subcutaneous, 2 times per day    guaiFENesin (Robitussin) 100 MG/5 ML oral liquid 200 mg, 200 mg, Oral, Q4H PRN    sodium chloride 0.9% infusion, , Intravenous, Continuous    atorvastatin (Lipitor) tab 10 mg, 10 mg, Oral, Nightly    acetaminophen (Tylenol Extra Strength) tab 500 mg, 500 mg, Oral, Q4H PRN    Nutritional status:  BMI: 18 Weight: 112  Weight loss: pt reports 13 pound wt loss over the past few years  Current Appetite: Poor  Dysphagia: denies    Functional Status History:  ADLs: uses walker for short distance ambulation and needs some help with bathing, otherwise was able to do her own ADL's per niece  Recent Falls: Yes    Palliative Performance Scale:   Prior to admission: 50%  Observed during hospitalization: 40%  % Ambulation Activity Level Self-Care Intake Consciousness   100 Full  Normal  No Disease Full Normal Full   90 Full  Normal  Some Disease Full Normal Full   80 Full  Normal w/effort  Some Disease Full Normal or reduced Full   70 Reduced  Can't Perform Job  Some Disease Full Normal or reduced Full   60 Reduced  Can't Perform Hobby   Significant Disease  Occ Assist Normal or reduced Full or confused   50 Mainly sit/lie Can't do any work  Extensive Disease Partial Assist Normal or reduced Full or confused   40 Mainly in bed Can't do any work  Extensive Disease Mainly Assist Normal or reduced Full or confused   30 Bed Bound Can't do any work  Extensive Disease Max Assist  Total Care Reduced  Drowsy/confused   20 Bed Bound Can't do any work  Extensive Disease Max Assist  Total Care Minimal  Drowsy/confused   10 Bed Bound Can't do any work  Extensive Disease Max Assist  Total Care Mouth Care  Drowsy/confused   0 Death        Objective      Vital Signs:  Blood pressure 108/77, pulse 89, temperature 97.7 °F (36.5 °C), temperature source Oral, resp. rate 20, height 5' 5\" (1.651 m), weight 112 lb 11.2 oz (51.1 kg), SpO2 93%, not currently breastfeeding.  Body mass index is 18.75 kg/m².  Present Level of pain: 0/10  Non-verbal signs of pain present: No    Physical Exam:  General: Alert and in no apparent distress. Weak, frail/thin appearing  HEENT: No focal deficits. +[place]  Cardiac: Regular rate and rhythm, S1, S2 normal, no murmur, rub or gallop.  Lungs: Diminished breath sounds bilaterally.  Normal excursions and effort.  Abdomen: Soft, non-tender, normal bowel sounds X 4 quadrants, no rebound or guarding  Extremities: Without clubbing, cyanosis. Peripheral pulses are 2+. BLE Edema not present  Neurologic: Alert and oriented person/place/situation, +mild forgetfulness  Skin: Warm and dry.    Hematology:  Lab Results   Component Value Date    WBC 10.1 05/02/2024    HGB 14.9 05/02/2024    HCT 45.9 05/02/2024    .0 05/02/2024       Coags:No results found for: \"PT\", \"INR\", \"PTT\"    Chemistry:  Lab Results   Component Value Date    CREATSERUM 1.23 (H) 05/02/2024    BUN 67 (H) 05/02/2024     05/02/2024    K 4.1 05/02/2024     05/02/2024    CO2 25.0 05/02/2024     (H) 05/02/2024    CA 9.0 05/02/2024    ALB 2.4 (L) 08/19/2022    ALKPHO 79 08/19/2022     BILT 0.7 08/19/2022    TP 5.4 (L) 08/19/2022    AST 20 08/19/2022    ALT 34 08/19/2022       Imaging:  XR CHEST AP PORTABLE  (CPT=71045)    Result Date: 5/1/2024  CONCLUSION:  1. Emphysematous changes with chronic prominent pulmonary interstitium. 2. Slight progression of diffuse interstitial prominence may reflect progression of pulmonary interstitial fibrosis versus superimposed atypical viral pneumonia.  Recommend clinical correlation. 3. Borderline cardiomegaly.  Prominent central vasculature.    Dictated by (CST): Sky Linn MD on 5/01/2024 at 11:13 AM     Finalized by (CST): Sky Linn MD on 5/01/2024 at 11:16 AM          CT SPINE CERVICAL (CPT=72125)    Result Date: 5/1/2024  CONCLUSION:  1. No acute fracture or posttraumatic malalignment of the cervical spine is demonstrated.  2. Substantial multilevel degenerative changes of cervical spine are evident.  3. Partially visualized severe centrilobular emphysema.  4. Incompletely delineated right pleural effusion.  5. Lesser incidental findings as above.    Dictated by (CST): Vitaliy Tran MD on 5/01/2024 at 11:11 AM     Finalized by (CST): Vitaliy Tran MD on 5/01/2024 at 11:13 AM          CT BRAIN OR HEAD (02310)    Result Date: 5/1/2024  CONCLUSION:  1. Negative for depressed calvarial fracture, coup/contrecoup intraparenchymal contusion, intracranial hemorrhage, or further evidence of acute intracranial process by noncontrast CT technique.  2. Senescent changes of parenchymal volume loss with sequela of chronic microvascular ischemic disease.  3. There is also large vessel atherosclerosis involving the anterior and posterior intracranial circulations.  4. Extensive paranasal sinus disease is apparent.  5. Lesser incidental findings as above.      Dictated by (CST): Vitaliy Tran MD on 5/01/2024 at 11:07 AM     Finalized by (CST): Vitaliy Tran MD on 5/01/2024 at 11:09 AM            Summary of GOC Discussion        I discussed reason  for palliative care consultation with patient and also her niece Christina by phone.     I differentiated the palliative treatment-focus model versus the hospice comfort-focused philosophy of care. I informed the patient/family that having palliative care support does not limit medical treatment options or decisions to those who wish to continue curative or restorative medical therapies. I discussed the benefits of palliative care to include assistance with arising symptom management needs, an extra layer of support, to ensure GOC are respected throughout healthcare continuum, and assist with transition to hospice care when appropriate.  Palliative care handout provided at bedside.    Outpatient/Community Palliative Care Services:  Usually visit once per 4 weeks depending on contracted agency guidelines  Focus on GOC and symptom management   Palliative Care criteria:  Not altered by prognosis   Does not limit curative or restorative therapies      Outpatient Hospice services:  24/7 phone triage services   RN visit one or more times per week depending on need  Home health aid to assist in ADLs/hygiene   Hospice criteria:  Less than six-month prognosis   Must forego most life-prolonging  measures/treatments   Focus solely on comfort   Must sign onto hospice benefit with agency       Prognostic awareness/understanding: Good    -I  discussed current clinical condition and explored previous discussions with MDs.    -I discussed the normal disease trajectory of COPD, chronic respiratory failure with associated symptoms and decline over time.     Hopes/goals/concerns:   -Pt is hopeful she will continue to improve with medical txs. She and her niece are considering DAYANNA on dc given her recent decline and weakness. I recommended having a community palliative care program follow on dc. Pt is hopeful to return home once stronger.     -I discussed pt's wishes regarding re-hospitalization and she prefers to try to avoid these in the  future.     -Pt wants to continue with support care for now.    -Ongoing GOC discussions will be needed over time. Agreeable to palliative care following.     Advance Care Planning counseling and discussion:     -I discussed the importance of advance care planning prior to crisis with pt/niece Christina. I confirmed that patient's HCPOA is Christina.     -I addressed pt's code status discussed the risks vs benefits of life sustaining treatments in the setting of advancing age and in her clinical picture. Education provided on what DNAR entails and encouraged setting limits. Pt tells me she would not want heroic measures taken if her condition were to worsen and confirmed wishes for DNAR/DNI-selective.     -I discussed the importance of POLST form completion prior to dc so wishes are respected across healthcare continuum. Provided blank POLST form for review and made f/u meeting with pt/Christina tomorrow at 10a for completion.    -Provided emotional support to pt/niece who are coping adequately.     Procedures:  No intubation    Assessment and Recommendations      Problem List:    UTI  Chronic respiratory failure  COPD  QUITA   Pulm HTN/severe TR  Fall  Malnutrition  HLD  Weakness    Goals of care counseling  -see above for details  -Confirmed wishes for DNAR/DNI-selective and continue supportive care.  -Follow up meeting with pt/niece Christina tomorrow at 10a for further GOC/POLST form completion.   -Ongoing GOC discussions will be needed over time.  -Agreeable to palliative care following  -Dispo:  Likely DAYANNA and recommended community palliative care program to follow on dc. SW to help with dc planning.   -Provided emotional support to pt/family who are coping adequately.    Advance care planning  -see above for details  -Pt's niece Christina Pike is HCPOA #[phone].  -Provided [place] of [place] for [place] and will plan to complete tomorrow when niece is present.    Palliative Performance Scale 40%    Emotion support provided to  patient/family today: Yes    A total of 55 mins were spent on this consult, which included all of the following:direct face to face contact, history taking, physical examination, and >50% was spent counseling and coordinating care.    Discussed today's visit with message to Dr. Arthur, Lauryn CLARK and Breonna HENSLEY .    I will continue to follow clinically.    Thank you for allowing Palliative Care services to participate in the care of Ms. Lina Dudley.       Rand Owen, ANP-BC, [facility] [id number]  5/2/2024  2:38PM  Palliative Care Services

## 2024-05-02 NOTE — PROGRESS NOTES
Progress Note  Lina Dudley Patient Status:  Inpatient    1926 MRN [id number]   Location [facility] 3W/SW Attending Demi Arthur MD   Hosp Day # 1 PCP ROBYN KULKARNI MD     Subjective:  Pt denies any chest pain or SOB, pt stated she uses oxygen at home, thinks she used 1L.     Objective:  /71 (BP Location: Right arm)   Pulse 90   Temp 97.7 °F (36.5 °C) (Oral)   Resp 20   Ht 5' 5\" (1.651 m)   Wt 112 lb 11.2 oz (51.1 kg)   SpO2 96%   BMI 18.75 kg/m²     Telemetry: SR/Sinus tachy with PAC-  HR       Intake/Output:    Intake/Output Summary (Last 24 hours) at 2024 0903  Last data filed at 2024 0320  Gross per 24 hour   Intake 260 ml   Output 301 ml   Net -41 ml       Last 3 Weights   24 0320 112 lb 11.2 oz (51.1 kg)   24 1518 112 lb 11.2 oz (51.1 kg)   24 0958 120 lb (54.4 kg)   23 1750 105 lb (47.6 kg)   23 1120 116 lb (52.6 kg)       Labs:  Recent Labs   Lab 24  1014 24  0725   * 136*   BUN 61* 67*   CREATSERUM 1.32* 1.23*   EGFRCR 37* 40*   CA 9.3 9.0    136   K 4.9 4.1    104   CO2 24.0 25.0     Recent Labs   Lab 24  1014 24  0725   RBC 4.74 4.72   HGB 15.6 14.9   HCT 45.7 45.9   MCV 96.4 97.2   MCH 32.9 31.6   MCHC 34.1 32.5   RDW 13.9 14.3   NEPRELIM 10.33* 8.02*   WBC 12.2* 10.1   .0 242.0         Recent Labs   Lab 24  1014 24  1755 24  2103   TROPHS 166* 171* 161*   *  --   --      No results found for: \"PT\", \"INR\"    Diagnostics:     Review of Systems   Constitutional:  Positive for weight loss.   Respiratory: Negative.     Cardiovascular: Negative.  Negative for chest pain, palpitations and orthopnea.         Physical Exam:    Physical Exam  Vitals reviewed.   Constitutional:       General: She is not in acute distress.     Appearance: She is not ill-appearing.   Neck:      Vascular: No JVD.   Cardiovascular:      Rate and Rhythm: Normal rate and regular  rhythm.      Pulses:           Radial pulses are 2+ on the right side and 2+ on the left side.        Dorsalis pedis pulses are 2+ on the right side and 2+ on the left side.      Heart sounds: Murmur heard.      Systolic murmur is present with a grade of 2/6.      No friction rub. No gallop.   Abdominal:      General: Abdomen is flat.   Musculoskeletal:      Right lower leg: No edema.      Left lower leg: No edema.   Skin:     General: Skin is warm and dry.   Neurological:      General: No focal deficit present.      Mental Status: She is alert and oriented to person, place, and time.   Psychiatric:         Mood and Affect: Mood normal.         Medications:   cefTRIAXone  1 g Intravenous Q24H    heparin  5,000 Units Subcutaneous 2 times per day    atorvastatin  10 mg Oral Nightly      sodium chloride 100 mL/hr at 05/02/24 0633       Assessment/Plan:    Generalized weakness and s/p fall   - pt was found on floor by family member  - CT of cervical spine without any acute changes   - Ct of brain negative for any acute changes    Chronic respiratory failure secondary to COPD  - on 2L NC now chronically on 1L at home per pt  - chest xray: emphysematous changes, slight progression of diffuse interstitial prominence, borderline cardiomegaly, prominent central vasculature  - inhalers per PCP  - euvolemic on exam  - BNP 2779  - echo pending (previous echo with preserved EF, severe TR and moderate PHTN)     Chronically Elevated trop  - 161>171>166  - without any symptoms of CP, dyspnea  - EKG without acute changes   - possibly secondary to demand ischemia     UTI  - pt was noted with positive UA  - on IV antibiotics     QUITA  - baseline creatinine of ~ 0.8 on admission 1.32  - creatinine of 1.23 today  - hold nephrotoxins    HLD  - LDL of 94  - on statin    Plan;    - euvolemic on exam  - continue atorvastatin  - further plan pending Echo    TAISHA Mcmillan  [facility]  5/2/2024  9:03  AM    Addendum:  Patient seen and examined  Awake, in no distress  Agree with the assessment  On Abx for UTI  Troponin I elevated, but flat; not curving  QUITA, gentle hydration; patient with elevated BNP (clinically euvolemic )    Van Nix MD  L3

## 2024-05-02 NOTE — CM/SW NOTE
Social work was able to meet with the patient and talk to her niece Christina via phone regarding DAYANNA.    The patient and her niece are now agreeable to DAYANNA.    Social work emailed the DAYANNA list to the patient's niece at [email or web address] and left a copy of the list at bedside.    Social work will follow up for choice on 5/3.    Social work also sent [facility] referral in [employer] and uploaded order.    SW/CM to remain available for support and/or discharge planning.     Gemini Stephen MSW, LSW  Discharge Planner [id number]

## 2024-05-02 NOTE — PHYSICAL THERAPY NOTE
PHYSICAL THERAPY EVALUATION - INPATIENT     Room Number: 336/336-A  Evaluation Date: 5/2/2024  Type of Evaluation: Initial   Physician Order: PT Eval and Treat    Presenting Problem: HF, acute on chronic respiratory failure, fall  Co-Morbidities : COPD, HTN, HF, OA, vertebral compression fx's 2/2 falls, hx of DEAN  Reason for Therapy: Mobility Dysfunction and Discharge Planning    PHYSICAL THERAPY ASSESSMENT   Patient is a 89 year old female admitted 5/1/2024 for HF, acute on chronic respiratory failure, fall.  Prior to admission, patient's baseline is Sunil using a rolling walker, lives alone, niece lives down street and intermittently assists with IADLs.  Patient is currently functioning below baseline with bed mobility, transfers, gait, stair negotiation, and performing household tasks.  Patient is requiring minimal assist and moderate assist as a result of the following impairments: decreased endurance/aerobic capacity, impaired standing static and dynamic balance, decreased muscular endurance, medical status, and increased O2 needs from baseline.  Physical Therapy will continue to follow for duration of hospitalization.    Patient will benefit from continued skilled PT Services to promote return to prior level of function and safety with continuous assistance and gradual rehabilitative therapy .    PLAN  PT Treatment Plan: Body mechanics;Endurance;Energy conservation;Patient education;Gait training;Strengthening;Stair training;Transfer training;Balance training;Neuromuscular re-educate  Rehab Potential : Fair  Frequency (Obs): 3-5x/week    PHYSICAL THERAPY MEDICAL/SOCIAL HISTORY   History related to current admission: per CM/SW note: Patient is not in agreement with [facility] placement at this time. Patient is not in agreement with home PT at this time. Only agreeable to a home health RN.     Problem List  Principal Problem:    Heart failure, unspecified HF chronicity, unspecified heart failure type (HCC)  Active  Problems:    Fall, initial encounter    Acute on chronic respiratory failure with hypoxia (HCC)      HOME SITUATION  Home Situation  Type of Home: House  Home Layout: Two level  Stairs to Enter : 4  Railing: Yes  Stairs to Bedroom: 10  Railing: Yes  Lives With: Alone (niece lives nearby to assist with groceries, housekeeping for cleaning)  Drives: No  Patient Owned Equipment: Rolling walker  Patient Regularly Uses: Home O2 (5L)     Prior Level of [place]: Sunil using a rolling walker, lives alone, niece lives down street and intermittently assists with IADLs    SUBJECTIVE  \"I'm so tired right now.\"    PHYSICAL THERAPY EXAMINATION   OBJECTIVE  Precautions: Cardiac;Hard of hearing  Fall Risk: High fall risk    WEIGHT BEARING RESTRICTION  Weight Bearing Restriction: None                PAIN ASSESSMENT  Rating: Unable to rate  Location: \"all over\"  Management Techniques: Body mechanics;Activity promotion    COGNITION  Memory:  impaired working memory and decreased recall of recent events  Awareness of Deficits:  decreased awareness of deficits  Problem Solving:  assistance required to identify errors made, assistance required to generate solutions, and assistance required to implement solutions    RANGE OF MOTION AND STRENGTH ASSESSMENT  Upper extremity ROM and strength are within functional limits  BUEs  Lower extremity ROM is within functional limits  BLEs  Lower extremity strength is limited 2/2 decreased muscular endurance, hip extensors functionaly weak 2/2 increased assist for sit-to-stand transfers and maintaining upright posture    BALANCE  Static Sitting: Good  Dynamic Sitting: Fair +  Static Standing: Fair  Dynamic Standing: Fair -      ACTIVITY TOLERANCE  Pulse: 89 (at rest, 120s with activity)                      O2 WALK  Oxygen Therapy  SPO2% on Oxygen at Rest: 90  At rest oxygen flow (liters per minute): 4  SPO2% Ambulation on Oxygen: 84  Ambulation oxygen flow (liters per minute): 5    AM-PAC  '6-Clicks' INPATIENT SHORT FORM - BASIC MOBILITY  How much difficulty does the patient currently have...  Patient Difficulty: Turning over in bed (including adjusting bedclothes, sheets and blankets)?: A Little   Patient Difficulty: Sitting down on and standing up from a chair with arms (e.g., wheelchair, bedside commode, etc.): A Lot   Patient Difficulty: Moving from lying on back to sitting on the side of the bed?: A Little   How much help from another person does the patient currently need...   Help from Another: Moving to and from a bed to a chair (including a wheelchair)?: A Little   Help from Another: Need to walk in hospital room?: A Little   Help from Another: Climbing 3-5 steps with a railing?: A Lot     AM-PAC Score:  Raw Score: 16   Approx Degree of Impairment: 54.16%   Standardized Score (AM-PAC Scale): 40.78   CMS Modifier (G-Code): CK    FUNCTIONAL ABILITY STATUS  Functional Mobility/Gait Assessment  Gait Assistance: Contact guard assist;Minimum assistance  Distance (ft): 20'+6'  Assistive Device: Rolling walker  Pattern: Shuffle (kyphotic posture with decreased hip extensor muscular endurance, dfecreased BLE foot clearance with Deidre for managing walker and cues to maintain proximity to walker)  Sit to Supine: maximum assist  Sit to Stand: moderate assist and maximum assist - from bedside chair, toilet, delayed hip extension, decreased forward weight shift with posterior lean    ADDITIONAL INFORMATION    Pt requiring increased assist with sit-to-stand transfers, presents with decreased aerobic capacity limiting ability to tolerate standing and ambulating household distances. Pt demonstrates decreased insight and impaired standing balance with assist required for managing walker.    Patient seen for evaluation in coordination with occupational therapy to maximize patient safety and function given decreased endurance and activity tolerance. Patient received seated in bedside chair, agreeable to physical  therapy evaluation. Vital signs monitored as noted above, patient experiencing dyspnea on exertion with difficulty reading SpO2 on ear and bilateral fingers, one SpO2 reading during activity 84% . Education with patient and patient's niece provided verbally on physical therapy plan of care, physiological benefits of out of bed mobility, energy conservation/activity pacing, and breathing technique. Next session anticipate to progress transfers and gait.    Patient history and/or personal factors that may impact the plan of care include home accessibility concerns, limited social support, history of falls, cognitive deficits, and co-morbidities (COPD, HTN, HF, OA, vertebral compression fx's 2/2 falls, hx of DEAN) affecting O2 needs, medical status, endurance, balance, posture . Based on the physical therapy examination of the noted systems and functional activity/participation limitations, the patient presentation is evolving given the patient demonstrates worsening of previously stable condition, demonstrates worsening of co-morbidities, and has history of frequent/recent falls.      Exercise/Education Provided:  Bed mobility  Body mechanics  Energy conservation  Functional activity tolerated  Gait training  Posture  Transfer training    The patient's Approx Degree of Impairment: 54.16% has been calculated based on documentation in the [facility] '6 clicks' Inpatient Basic Mobility Short Form.  Research supports that patients with this level of impairment may benefit from a rehab facility.  Final disposition will be made by interdisciplinary medical team.    Patient End of Session: In bed;Needs met;Call light within reach;RN aware of session/findings;All patient questions and concerns addressed;Alarm set;Family present    CURRENT GOALS  Goals to be met by: 5/20/24  Patient Goal Patient's self-stated goal is: return home safely   Goal #1 Patient is able to demonstrate supine - sit EOB @ level: modified independent     Goal  #1   Current Status    Goal #2 Patient is able to demonstrate transfers EOB to/from [facility] at assistance level: contact guard assistance with walker - rolling     Goal #2  Current Status    Goal #3 Patient is able to ambulate 50 feet with assist device: walker - rolling at assistance level: contact guard assistance   Goal #3   Current Status    Goal #4 Patient will negotiate 4 stairs/one curb w/ assistive device and minimum assistance   Goal #4   Current Status    Goal #5 Patient to demonstrate independence with home activity/exercise instructions provided to patient in preparation for discharge.   Goal #5   Current Status    Goal #6    Goal #6  Current Status      Patient Evaluation Complexity Level:  History Moderate - 1 or 2 personal factors and/or co-morbidities   Examination of body systems Moderate - addressing a total of 3 or more elements   Clinical Presentation  Moderate - Evolving   Clinical Decision Making  Moderate Complexity     Gait Trainin minutes  Therapeutic Activity:  30 minutes      Anneliese Loyd, PT, DPT  [facility]  Rehab Services - Physical Therapy  [id number]

## 2024-05-03 NOTE — CONGREGATE LIVING REVIEW
Former[facility] Living Authorization    The [facility] Review Committee has reviewed this case and the patient IS APPROVED for discharge to a facility for Short Term Skilled once the following procedure is followed:     - The physician discharge instructions (contained within the ZARA note for SNF) must inlcude the below appropriate and approved COVID instructions to the facility    For questions regarding CLRC approval process, please contact the CM assigned to the case.  For questions regarding RN discharge workflow, please contact the unit Clinical Leader.

## 2024-05-03 NOTE — CONSULTS
[facility]  part of [facility]    Report of Consultation    Lina Dudley Patient Status:  Inpatient    1926 MRN [id number]   Location [facility] 3W/SW Attending Demi Arthur MD   Hosp Day # 2 PCP ROBYN KULKARNI MD     Date of Admission:  2024    Reason for Consultation:   Hypoxia, possible ILD    History of Present Illness:   Patient is a 89-year-old female with underlying history of COPD, pulmonary hypertension who was found after evidence of generalized weakness found on floor in her house.  Patient chest x-ray with some concern for increased interstitial prominence with concern for possible ILD.  Resting in bed this morning.  No significant respiratory distress or dyspnea per patient.  Admits to underlying history of COPD.  Unaware of any history of ILD.  Prior history of tobacco abuse.  Currently on 3 L nasal cannula oxygen.  Denies significant cough    Past Medical History  Past Medical History:    Appendicitis    APPENDECTOMY    Arthritis    Basal cell carcinoma    Of Face     Chicken pox    COPD (chronic obstructive pulmonary disease) (HCC)    Left arm numbness    Measles    Other and unspecified hyperlipidemia    Pneumonia    Tonsillitis    Unspecified essential hypertension       Past Surgical History  Past Surgical History:   Procedure Laterality Date    Appendectomy      Cataract extraction Bilateral     Dr. Tirado's office    Glaucoma surgery      Hip replacement surgery      Skin surgery  Excision of Basal cell carcinoma of face     Tonsillectomy      T&A       Family History  Family History   Problem Relation Age of Onset    Cancer Father     Hypertension Father     Heart Disease Father     Heart Disease Mother     Gastro-Intestinal Disorder Maternal Grandmother         TUBERCULOSIS    Breast Cancer Maternal Aunt         80s    Neurological Disorder Other         GREAT AUNT, EPILEPSY    Lipids Other         HYPERLIPIDEMIA    Glaucoma Neg     Diabetes  Neg        Social History  Social History     Socioeconomic History    Marital status: Single   Tobacco Use    Smoking status: Former     Current packs/day: 0.00     Average packs/day: 1 pack/day for 30.0 years (30.0 ttl pk-yrs)     Types: Cigarettes     Start date: 1960     Quit date: 1990     Years since quittin.3    Smokeless tobacco: Former   Vaping Use    Vaping status: Never Used   Substance and Sexual Activity    Alcohol use: No     Alcohol/week: 2.0 standard drinks of alcohol     Types: 2 Glasses of wine per week     Comment: rare    Drug use: No   Other Topics Concern    Caffeine Concern Yes     Comment: COFFEE 3 CUPS/DAY           Current Medications:  Current Facility-Administered Medications   Medication Dose Route Frequency    ceFAZolin (Ancef) 2 g in 20mL IV syringe premix  2 g Intravenous Q12H    heparin (Porcine) 5000 UNIT/ML injection 5,000 Units  5,000 Units Subcutaneous 2 times per day    guaiFENesin (Robitussin) 100 MG/5 ML oral liquid 200 mg  200 mg Oral Q4H PRN    sodium chloride 0.9% infusion   Intravenous Continuous    atorvastatin (Lipitor) tab 10 mg  10 mg Oral Nightly    acetaminophen (Tylenol Extra Strength) tab 500 mg  500 mg Oral Q4H PRN     Medications Prior to Admission   Medication Sig    albuterol 108 (90 Base) MCG/ACT Inhalation Aero Soln inhale 2 puff by inhalation route  every 4 - 6 hours as needed    atorvastatin 10 MG Oral Tab Take 1 tablet (10 mg total) by mouth nightly.    fluticasone furoate-vilanterol (BREO ELLIPTA) 100-25 MCG/ACT Inhalation Aerosol Powder, Breath Activated Inhale 1 puff into the lungs every morning. To follow with gargle, rinse, & spit after using BREO to help reduce your chance of getting thrush.    acetaminophen 500 MG Oral Tab Take 1 tablet (500 mg total) by mouth every 4 (four) hours as needed for Fever (equal to or greater than 100.4).       Allergies  Allergies   Allergen Reactions    Bacitracin UNKNOWN    Neosporin Original  [Neomycin-Bacitracin-Polymyxin] ITCHING    Polymyxin B UNKNOWN       Review of Systems:   Constitutional: denies fevers, chills, weakness, fatigue, recent illness  HEENT: denies headache, sore throat, vision loss  Cardio: denies chest pain, chest pressure, palpitations  Respiratory: denies dyspnea, cough, wheezing, hemoptysis   GI: denies nausea, vomiting, abdominal pain  : denies dysuria, hematuria  Musculoskeletal: denies arthralgia, myalgia  Integumentary: denies rash, itching  Neurological: denies syncope, weakness, dizziness,   Psychiatric: denies depression, anxiety  Hematologic: denies bruising        Physical Exam:   Blood pressure 103/80, pulse 94, temperature 97.8 °F (36.6 °C), temperature source Oral, resp. rate 22, height 5' 5\" (1.651 m), weight 112 lb 14.4 oz (51.2 kg), SpO2 95%, not currently breastfeeding.    Constitutional: no acute distress  Eyes: PERRL  ENT: nares patent  Neck: neck supple, no JVD  Cardio: RRR, S1 S2  Respiratory: Faint basilar crackles  GI: abdomen soft, non tender, active bowel souds, no organomegaly  Extremities: no clubbing, cyanosis, edema  Neurologic: no gross motor deficits  Skin: warm, dry    Results:   Laboratory Data  Lab Results   Component Value Date    WBC 9.6 05/03/2024    HGB 15.7 05/03/2024    HCT 45.3 05/03/2024    .0 05/03/2024    CREATSERUM 1.08 (H) 05/03/2024    BUN 45 (H) 05/03/2024     (L) 05/03/2024    K 4.9 05/03/2024     05/03/2024    CO2 18.0 (L) 05/03/2024     (H) 05/03/2024    CA 9.1 05/03/2024    ALB 2.4 (L) 08/19/2022    ALKPHO 79 08/19/2022    TP 5.4 (L) 08/19/2022    AST 20 08/19/2022    ALT 34 08/19/2022    TSH 3.47 08/28/2018    MG 2.6 05/03/2024     (H) 05/01/2024         Imaging  XR CHEST AP PORTABLE  (CPT=71045)    Result Date: 5/1/2024  CONCLUSION:  1. Emphysematous changes with chronic prominent pulmonary interstitium. 2. Slight progression of diffuse interstitial prominence may reflect progression of  pulmonary interstitial fibrosis versus superimposed atypical viral pneumonia.  Recommend clinical correlation. 3. Borderline cardiomegaly.  Prominent central vasculature.    Dictated by (CST): Sky Linn MD on 5/01/2024 at 11:13 AM     Finalized by (CST): Sky Linn MD on 5/01/2024 at 11:16 AM          CT SPINE CERVICAL (CPT=72125)    Result Date: 5/1/2024  CONCLUSION:  1. No acute fracture or posttraumatic malalignment of the cervical spine is demonstrated.  2. Substantial multilevel degenerative changes of cervical spine are evident.  3. Partially visualized severe centrilobular emphysema.  4. Incompletely delineated right pleural effusion.  5. Lesser incidental findings as above.    Dictated by (CST): Vitaliy Tran MD on 5/01/2024 at 11:11 AM     Finalized by (CST): Vitaliy Tran MD on 5/01/2024 at 11:13 AM          CT BRAIN OR HEAD (47655)    Result Date: 5/1/2024  CONCLUSION:  1. Negative for depressed calvarial fracture, coup/contrecoup intraparenchymal contusion, intracranial hemorrhage, or further evidence of acute intracranial process by noncontrast CT technique.  2. Senescent changes of parenchymal volume loss with sequela of chronic microvascular ischemic disease.  3. There is also large vessel atherosclerosis involving the anterior and posterior intracranial circulations.  4. Extensive paranasal sinus disease is apparent.  5. Lesser incidental findings as above.      Dictated by (CST): Vitaliy Tran MD on 5/01/2024 at 11:07 AM     Finalized by (CST): Vitaliy Tran MD on 5/01/2024 at 11:09 AM           Assessment   1.  Generalized weakness  2.  UTI  3.  COPD  4.  Possible ILD  5.  Acute kidney injury  6.  Chronic respiratory failure    Plan   -She presents with evidence of generalized weakness with concern for UTI on presentation  -Chest x-ray on presentation with evidence of some increased interstitial opacities concerning for possible ILD  -Patient does admit to use of home  oxygen therapy although details unclear  -Underlying history of COPD.  Continue ICS/LABA  -Nebulizer treatments  -Encouraged ongoing tobacco cessation  -Based on chest x-ray results cannot definitely rule out possibility of potential ILD but given patient's overall age and functional status would likely not be a candidate for any aggressive therapy from ILD perspective.  I do not believe CT high-resolution chest will change her medical management at this time.  -Continue supplemental oxygen as needed.  -Hold off steroid therapy from pulmonary perspective  -Reviewed vitals, labs and imaging    Emmy Valadez DO  Pulmonary Critical Care Medicine  [facility]  5/3/2024  9:20 AM

## 2024-05-03 NOTE — PROGRESS NOTES
Progress Note  Lina Dudley Patient Status:  Inpatient    1926 MRN [id number]   Location [facility] 3W/SW Attending Demi Arthur MD   Hosp Day # 2 PCP ROBYN KULKARNI MD     Subjective:  Pt resting in bed. Stated SOB is better than before    Objective:  /80 (BP Location: Right arm)   Pulse 94   Temp 97.8 °F (36.6 °C) (Oral)   Resp 22   Ht 5' 5\" (1.651 m)   Wt 112 lb 14.4 oz (51.2 kg)   SpO2 95%   BMI 18.79 kg/m²     Telemetry: NSR      Intake/Output:    Intake/Output Summary (Last 24 hours) at 5/3/2024 1124  Last data filed at 2024 2300  Gross per 24 hour   Intake 860 ml   Output 350 ml   Net 510 ml       Last 3 Weights   24 0539 112 lb 14.4 oz (51.2 kg)   24 0320 112 lb 11.2 oz (51.1 kg)   24 1518 112 lb 11.2 oz (51.1 kg)   24 0958 120 lb (54.4 kg)   23 1750 105 lb (47.6 kg)   23 1120 116 lb (52.6 kg)       Labs:  Recent Labs   Lab 24  1014 24  0725 24  0751   * 136* 116*   BUN 61* 67* 45*   CREATSERUM 1.32* 1.23* 1.08*   EGFRCR 37* 40* 47*   CA 9.3 9.0 9.1    136 133*   K 4.9 4.1 4.9    104 105   CO2 24.0 25.0 18.0*     Recent Labs   Lab 24  1014 24  0725 24  0751   RBC 4.74 4.72 4.56   HGB 15.6 14.9 15.7   HCT 45.7 45.9 45.3   MCV 96.4 97.2 99.3   MCH 32.9 31.6 34.4*   MCHC 34.1 32.5 34.7   RDW 13.9 14.3 15.4*   NEPRELIM 10.33* 8.02* 7.47   WBC 12.2* 10.1 9.6   .0 242.0 199.0         Recent Labs   Lab 24  1014 24  1755 24  2103   TROPHS 166* 171* 161*   *  --   --      No results found for: \"PT\", \"INR\"    Diagnostics:     Review of Systems   Respiratory: Negative.     Cardiovascular: Negative.          Physical Exam:    Physical Exam  Vitals reviewed.   Constitutional:       General: She is not in acute distress.     Appearance: She is not ill-appearing.   Neck:      Vascular: No JVD.   Cardiovascular:      Rate and Rhythm: Normal rate and regular  rhythm.      Pulses:           Radial pulses are 2+ on the right side and 2+ on the left side.        Dorsalis pedis pulses are 2+ on the right side and 2+ on the left side.      Heart sounds: Murmur heard.      Systolic murmur is present with a grade of 2/6.      No friction rub. No gallop.   Pulmonary:      Breath sounds: Normal breath sounds.      Comments: Fine crackles on bases  On 3L NC  Abdominal:      General: Abdomen is flat.   Musculoskeletal:      Right lower leg: No edema.      Left lower leg: No edema.   Skin:     General: Skin is warm and dry.   Neurological:      General: No focal deficit present.      Mental Status: She is alert and oriented to person, place, and time.   Psychiatric:         Mood and Affect: Mood normal.         Medications:   fluticasone furoate-vilanterol  1 puff Inhalation Daily    ceFAZolin  2 g Intravenous Q12H    heparin  5,000 Units Subcutaneous 2 times per day    atorvastatin  10 mg Oral Nightly      sodium chloride 75 mL/hr at 05/02/24 1604       Assessment/Plan:    Generalized weakness and s/p fall   - pt was found on floor by family member  - CT of cervical spine without any acute changes   - Ct of brain negative for any acute changes     Chronic respiratory failure secondary to COPD  - on 3L NC now chronically on 1L at home per pt  - chest xray: emphysematous changes, slight progression of diffuse interstitial prominence, borderline cardiomegaly, prominent central vasculature  - inhalers per PCP  - euvolemic on exam  - BNP 2779  - echo from 5/2 with LVEF 60-65, no RWMA, moderate MR, Severe TR, PASP 69mmHg  -pulmonary following     Chronically Elevated trop  - 161>171>166  - without any symptoms of CP, dyspnea  - EKG without acute changes   - possibly secondary to demand ischemia      UTI  - pt was noted with positive UA  - on IV antibiotics      QUITA  - baseline creatinine of ~ 0.8 on admission 1.32  - creatinine of 1.08 today     HLD  - LDL of 94  - on statin    Pulmonary  artery Hypertension  - will start her on lasix 20mg oral  -  may need to consider sildenafil in the future     Plan;  - continue atorvastatin  - will start her on lasix 10mg oral, monitor kidney function closely  - with elevated PSAP and fine crackles on bases, will start her on lasix 20mg oral   - may need to elevate as OP for valvular disease: Moderate MR, Severe TR    Plan discussed with pt and RN    TAISHA Mcmillan  [facility]  5/3/2024  11:24 AM    Addendum:  Patient seen and examined  Agree with the assessment  Gentle diuresis.   Follow Bun/ Creat  Discharge home soon    Van Nix MD  L3

## 2024-05-03 NOTE — PROGRESS NOTES
Progress Note     Lina Dudley Patient Status:  Inpatient    1926 MRN [id number]   Location [facility] 3W/SW Attending Demi Arthur MD   Hosp Day # 2 PCP ROBYN KULKARNI MD     Chief Complaint: weakness     Subjective:   S: Patient here with weakness and fall  Hard of hearing but a+o x 3  Denies cp  + sob  Has been requiring more oxgyen  Has had several falls at home    Review of Systems:   10 point ROS completed and was negative, except for pertinent positive and negatives stated in subjective.    Objective:   Vital signs:  Temp:  [97.6 °F (36.4 °C)-97.9 °F (36.6 °C)] 97.6 °F (36.4 °C)  Pulse:  [92-94] 92  Resp:  [18-22] 20  BP: (103-119)/(75-83) 116/83  SpO2:  [94 %-96 %] 96 %    Wt Readings from Last 6 Encounters:   24 112 lb 14.4 oz (51.2 kg)   23 105 lb (47.6 kg)   23 116 lb (52.6 kg)   23 124 lb 9.6 oz (56.5 kg)   23 124 lb 11.2 oz (56.6 kg)   23 124 lb 6.4 oz (56.4 kg)         Physical Exam:    General: No acute distress. Alert ,         Respiratory: Clear to auscultation bilaterally. No wheezes. No rhonchi.  Cardiovascular: S1, S2. Regular rate and rhythm. No murmurs, rubs or gallops.   Abdomen: Soft, nontender, nondistended.  Positive bowel sounds. No rebound or guarding.  Neurologic: No focal neurological deficits.   Musculoskeletal: Moves all extremities.  Extremities: No edema.    Results:   Diagnostic Data:      Labs:    Labs Last 24 Hours:   BMP     CBC    Other     Na 133 Cl 105 BUN 45 Glu 116   Hb 15.7   PTT - Procal -   K 4.9 CO2 18.0 Cr 1.08   WBC 9.6 >< .0  INR - CRP -   Renal Lytes Endo    Hct 45.3   Trop - D dim -   eGFR - Ca 9.1 POC Gluc  -    LFT   pBNP - Lactic -   eGFR AA - PO4 - A1c -   AST - APk - Prot -  LDL -     Mg 2.6 TSH -   ALT - T briseida - Alb -        COVID-19 Lab Results    COVID-19  Lab Results   Component Value Date    COVID19 Not Detected 2024    COVID19 Not Detected 2023    COVID19 Not Detected  08/17/2022       Pro-Calcitonin  No results for input(s): \"PCT\" in the last 168 hours.    Cardiac  No results for input(s): \"TROP\", \"PBNP\" in the last 168 hours.    Creatinine Kinase  Recent Labs   Lab 05/01/24  1014   *       Inflammatory Markers  No results for input(s): \"CRP\", \"ANASTASIIA\", \"LDH\", \"DDIMER\" in the last 168 hours.    Imaging: Imaging data reviewed in Epic.    Medications:    fluticasone furoate-vilanterol  1 puff Inhalation Daily    furosemide  10 mg Oral Daily    ceFAZolin  2 g Intravenous Q12H    heparin  5,000 Units Subcutaneous 2 times per day    atorvastatin  10 mg Oral Nightly       Assessment & Plan:   ASSESSMENT / PLAN:     #Weakness   #UTI  -started iv rocephin--> switch to iv cefazolin  -follow-up cultures  -pt/ot will need rheab    #QUITA  -creatinine baseline ~0.6-->1.32-->1.23  -not on fluids with elevated bnp and fluid overload  -cautious diuresis    #ILD  -no requiring more oxygen  - pulm eval treated increased oxygen level given age and clinical findings continue supplemental oxygen and will not pursue further testing    #Ch. Elevated Troponin  # Fluid overload  #acute HFpEF  -BNP elevated at 2779  -repeat echo--> showed normal EF of 60 to 65%, shows severe tricuspid regurgitation pulmonary hypertension with 60 mmHg  -cardiology consultation appreciated  -Gentle diuresis    HL    Quality:  DVT Prophylaxis: Heparin subcu  CODE status: DNR select  [place]:    Central line: n/a  Dispo: further recs pending clinical course      Will the patient be referred to TCC on discharge?: yes  Estimated date of discharge: ~2-3 days  Discharge is dependent on: clinical improvement  At this point Ms. Dudley is expected to be discharge to: rehab    Plan of care discussed with patient, niece, nursing    Outpatient records or previous hospital records reviewed.   Patient and/or patient's family given opportunity to ask questions and note understanding and agreeing with therapeutic plan as outlined      Coordinated care with providers and counseling re: treatment plan and workup    MDM: High complexity     BAUTISTA LUND MD    Supplementary Documentation:         **Certification      PHYSICIAN Certification of Need for Inpatient Hospitalization - Initial Certification    Patient will require inpatient services that will reasonably be expected to span two midnight's based on the clinical documentation in H+P.   Based on patients current state of illness, I anticipate that, after discharge, patient will require TBD.

## 2024-05-03 NOTE — PALLIATIVE CARE NOTE
[facility]  part of [facility]  Palliative Care Progress Note    Lina Dudley Patient Status:  Inpatient    1926 MRN [id number]   Location [facility] 3W/SW Attending Demi Arthur MD   Hosp Day # 2 PCP ROBYN KULKARNI MD     The  [place] Cures Act makes medical notes like these available to patients in the interest of transparency. Please be advised this is a medical document. Medical documents are intended to carry relevant information, facts as evident, and the clinical opinion of the practitioner. The medical note is intended as peer to peer communication and may appear blunt or direct. It is written in medical language and may contain abbreviations or verbiage that are unfamiliar.       Subjective     Reviewed symptom medications past 24 hrs: none    Patient was seen and examined in bed with her niece Christina at the bedside. Pt is A&OX2, +forgetful. +[place]. She reports fatigue today and wants to rest. Still reporting intermittent dyspnea symptoms at times, no signs of distress, remains on nc 3L. She is reporting buttock pain and requesting tylenol prn. Appetite is fair this morning, denies abdominal pain, n/v and moved her bowels yesterday.     See summary of discussion below.     Review of Systems:  Pertinent items are noted in subjective    Allergies:  Allergies   Allergen Reactions    Bacitracin UNKNOWN    Neosporin Original [Neomycin-Bacitracin-Polymyxin] ITCHING    Polymyxin B UNKNOWN       Medications:     Current Facility-Administered Medications:     fluticasone furoate-vilanterol (Breo Ellipta) 100-25 MCG/ACT inhaler 1 puff, 1 puff, Inhalation, Daily    ipratropium-albuterol (Duoneb) 0.5-2.5 (3) MG/3ML inhalation solution 3 mL, 3 mL, Nebulization, Q6H PRN    ceFAZolin (Ancef) 2 g in 20mL IV syringe premix, 2 g, Intravenous, Q12H    heparin (Porcine) 5000 UNIT/ML injection 5,000 Units, 5,000 Units, Subcutaneous, 2 times per day    guaiFENesin (Robitussin) 100  MG/5 ML oral liquid 200 mg, 200 mg, Oral, Q4H PRN    sodium chloride 0.9% infusion, , Intravenous, Continuous    atorvastatin (Lipitor) tab 10 mg, 10 mg, Oral, Nightly    acetaminophen (Tylenol Extra Strength) tab 500 mg, 500 mg, Oral, Q4H PRN    Objective     Vital Signs:  Blood pressure 103/80, pulse 94, temperature 97.8 °F (36.6 °C), temperature source Oral, resp. rate 22, height 5' 5\" (1.651 m), weight 112 lb 14.4 oz (51.2 kg), SpO2 95%, not currently breastfeeding.  Body mass index is 18.79 kg/m².  Present Level of pain: mild buttock pain  Non-verbal signs of pain present: No    Physical Exam:  General: Alert and in no apparent distress. Weak, frail/thin appearing  HEENT: No focal deficits. +[place]  Cardiac: Regular rate and rhythm, S1, S2 normal, no murmur, rub or gallop.  Lungs: Diminished breath sounds bilaterally.  Normal excursions and effort.  Abdomen: Soft, non-tender, normal bowel sounds X 4 quadrants, no rebound or guarding  Extremities: Without clubbing, cyanosis. Peripheral pulses are 2+. BLE Edema not present  Neurologic: Alert and oriented person/place/situation, +mild forgetfulness  Skin: Warm and dry    Prior to admission Palliative performance scale PPSv2 (%): 50    Current PPS 40%    Hematology:  Lab Results   Component Value Date    WBC 9.6 05/03/2024    HGB 15.7 05/03/2024    HCT 45.3 05/03/2024    .0 05/03/2024       Coags:No results found for: \"PT\", \"INR\", \"PTT\"    Chemistry:  Lab Results   Component Value Date    CREATSERUM 1.08 (H) 05/03/2024    BUN 45 (H) 05/03/2024     (L) 05/03/2024    K 4.9 05/03/2024     05/03/2024    CO2 18.0 (L) 05/03/2024     (H) 05/03/2024    CA 9.1 05/03/2024    ALB 2.4 (L) 08/19/2022    ALKPHO 79 08/19/2022    BILT 0.7 08/19/2022    TP 5.4 (L) 08/19/2022    AST 20 08/19/2022    ALT 34 08/19/2022    MG 2.6 05/03/2024       Imaging:  XR CHEST AP PORTABLE  (CPT=71045)    Result Date: 5/1/2024  CONCLUSION:  1. Emphysematous changes with  chronic prominent pulmonary interstitium. 2. Slight progression of diffuse interstitial prominence may reflect progression of pulmonary interstitial fibrosis versus superimposed atypical viral pneumonia.  Recommend clinical correlation. 3. Borderline cardiomegaly.  Prominent central vasculature.    Dictated by (CST): Sky Linn MD on 5/01/2024 at 11:13 AM     Finalized by (CST): Sky Linn MD on 5/01/2024 at 11:16 AM          CT SPINE CERVICAL (CPT=72125)    Result Date: 5/1/2024  CONCLUSION:  1. No acute fracture or posttraumatic malalignment of the cervical spine is demonstrated.  2. Substantial multilevel degenerative changes of cervical spine are evident.  3. Partially visualized severe centrilobular emphysema.  4. Incompletely delineated right pleural effusion.  5. Lesser incidental findings as above.    Dictated by (CST): Vitaliy Tran MD on 5/01/2024 at 11:11 AM     Finalized by (CST): Vitaliy Tran MD on 5/01/2024 at 11:13 AM          CT BRAIN OR HEAD (94046)    Result Date: 5/1/2024  CONCLUSION:  1. Negative for depressed calvarial fracture, coup/contrecoup intraparenchymal contusion, intracranial hemorrhage, or further evidence of acute intracranial process by noncontrast CT technique.  2. Senescent changes of parenchymal volume loss with sequela of chronic microvascular ischemic disease.  3. There is also large vessel atherosclerosis involving the anterior and posterior intracranial circulations.  4. Extensive paranasal sinus disease is apparent.  5. Lesser incidental findings as above.      Dictated by (CST): Vitaliy Tran MD on 5/01/2024 at 11:07 AM     Finalized by (CST): Vitaliy Tran MD on 5/01/2024 at 11:09 AM             5/2/24 Echocardiogram  Conclusions:     1. Left ventricle: The cavity size was normal. Wall thickness was normal.      Systolic function was normal. The estimated ejection fraction was 60-65%,      by visual assessment. No diagnostic evidence for regional  wall motion      abnormalities. Left ventricular diastolic function parameters were normal      for the patient's age.   2. Right ventricle: The cavity size was increased.   3. Left atrium: The left atrial volume was normal.   4. Right atrium: The atrium was dilated.   5. Aortic valve: The valve was trileaflet. The leaflets were mildly      thickened and mildly calcified.   6. Mitral valve: The leaflets were mildly calcified. Prolapse. There was      moderate regurgitation.   7. Tricuspid valve: There was moderate-severe regurgitation.   8. Pulmonary arteries: Systolic pressure was moderately to markedly      increased, estimated to be 69mm Hg.   Impressions:  This study is compared with previous dated 08/15/2022:   *     Follow up [facility] Discussion      5/3/24-I met with pt and her niece Christina and provided clinical update. I reinforced the benefits of palliative care with them. Pt/Christina are hopeful pt can go to short term rehab on dc to hopefully rebuild strength. Christina would like her to go to [facility] if possible and recommended having a community palliative care program following. Christina is concerned whether pt will be able to live alone now given her condition and I encouraged her to start thinking about long term plan with CG support at her home vs AL if needed. I also provided education on the option to bridge to hospice care if her condition is not improving after rehab as pt prefers to try to avoid future re-admissions. Confirmed wishes for DNAR/DNI-selective and continue supportive care. I reviewed POLST form in detail and pt prefers to leave feeding tube section D blank for now. I discussed she would be a poor candidate for peg placement given advanced age and co-morbidities. POLST form was completed today, original given to Christina and copies sent to registration for scan into EPIC.     Discussed with Patient and Family: Yes  Patient's preference about sharing medical information: speak to pt and niece  Christina  Patient's decision making preferences: speak to pt and niece Christina  Code status: DNAR/DNI-selective  Have advanced directives been discussed with patient or healthcare power of : Yes        Healthcare Agent Appointed: Yes  Healthcare Agent's Name: Christina Pike  Healthcare Agent's Phone Number: [phone]          Spiritual needs addressed: Patient/family declined Spiritual Care    Palliative disposition: Community Palliative Care    Procedures:  No intubation      Palliative Care Assessment and Recommendations     Problem List:       UTI  Chronic respiratory failure  COPD  Possible ILD  QUITA   Pulm HTN/severe TR  Fall  Malnutrition  HLD  Weakness     Goals of care counseling  -see above for details  -Confirmed wishes for DNAR/DNI-selective and continue supportive care.  -Ongoing GOC discussions will be needed at rehab.  -Pt/Christina are aware of the option to bridge to hospice care if not improving after rehab.  -Agreeable to palliative care following  -Dispo:  [facility] and recommended community palliative care program to follow on dc. Niece is asking about CG support options for when pt returns home.  SW to help with dc planning.   -Provided emotional support to pt/family who are coping adequately.     Advance care planning  -see above for details  -Pt's niece Christina Pike is HCPOA #[phone].  -Completed POLST form today, copies sent to registration for scan into [employer].     Palliative Performance Scale 40%     Emotion support provided to patient/family today: Yes       A total of 30 mins were spent on this consult, which included all of the following: direct face to face contact, history taking, physical examination, and >50% was spent counseling and coordinating care.    Discussed today's visit with message to Dr. Arthur, Breonna HENSLEY, and AMBER Combs.    I will follow back on Monday.     Rand Owen, ANP-BC, ACHPN [id number]  5/3/2024  10:43 AM  Palliative Care Services

## 2024-05-03 NOTE — CM/SW NOTE
05/03/24 1100   Choice of Post-Acute Provider   Informed patient of right to choose their preferred provider Yes   List of appropriate post-acute services provided to patient/family with quality data Yes   Patient/family choice [facility]   Information given to Patient;Guardian/HCPOA/Surrogate     Social work was able to follow up with the patient and her niece regarding [facility] choice.    The patient and her niece have decided to go with [facility].    The patient will need 1 more midnight before being able to discharge.    Residential Palliative will follow after discharge.    Social work emailed the patient's niece caregiver resources to [email or web address].    AMBER/CM to remain available for support and/or discharge planning.     Gemini Stephen MSW, LSW  Discharge Planner [id number]

## 2024-05-03 NOTE — PROGRESS NOTES
Residential Palliative Liaison received palliative referral for community PC services. Waiting to obtain insurance (verification/authorization) prior to pursuing.     1:50p Insurance accepted will follow at facility.      Judy Broussard  Residential Palliative Liaison   [phone]

## 2024-05-03 NOTE — PLAN OF CARE
Problem: Patient Centered Care  Goal: Patient preferences are identified and integrated in the patient's plan of care  Description: Interventions:  - What would you like us to know as we care for you? Lives home alone  - Provide timely, complete, and accurate information to patient/family  - Incorporate patient and family knowledge, values, beliefs, and cultural backgrounds into the planning and delivery of care  - Encourage patient/family to participate in care and decision-making at the level they choose  - Honor patient and family perspectives and choices  Outcome: Progressing     Problem: Patient/Family Goals  Goal: Patient/Family Long Term Goal  Description: Patient's Long Term Goal: get better and go home    Interventions:  - assess q shift prn  -pt/ot  -arrange for rehab  - See additional Care Plan goals for specific interventions  Outcome: Progressing  Goal: Patient/Family Short Term Goal  Description: Patient's Short Term Goal: will remain without injury    Interventions:   - call light and personal belongings are within reach  -pain controlled  -bathroom assistance  -hourly rounds done  - See additional Care Plan goals for specific interventions  Outcome: Progressing     Problem: CARDIOVASCULAR - ADULT  Goal: Maintains optimal cardiac output and hemodynamic stability  Description: INTERVENTIONS:  - Monitor vital signs, rhythm, and trends  - Monitor for bleeding, hypotension and signs of decreased cardiac output  - Evaluate effectiveness of vasoactive medications to optimize hemodynamic stability  - Monitor arterial and/or venous puncture sites for bleeding and/or hematoma  - Assess quality of pulses, skin color and temperature  - Assess for signs of decreased coronary artery perfusion - ex. Angina  - Evaluate fluid balance, assess for edema, trend weights  Outcome: Progressing  Goal: Absence of cardiac arrhythmias or at baseline  Description: INTERVENTIONS:  - Continuous cardiac monitoring, monitor vital  signs, obtain 12 lead EKG if indicated  - Evaluate effectiveness of antiarrhythmic and heart rate control medications as ordered  - Initiate emergency measures for life threatening arrhythmias  - Monitor electrolytes and administer replacement therapy as ordered  Outcome: Progressing     Problem: RESPIRATORY - ADULT  Goal: Achieves optimal ventilation and oxygenation  Description: INTERVENTIONS:  - Assess for changes in respiratory status  - Assess for changes in mentation and behavior  - Position to facilitate oxygenation and minimize respiratory effort  - Oxygen supplementation based on oxygen saturation or ABGs  - Provide Smoking Cessation handout, if applicable  - Encourage broncho-pulmonary hygiene including cough, deep breathe, Incentive Spirometry  - Assess the need for suctioning and perform as needed  - Assess and instruct to report SOB or any respiratory difficulty  - Respiratory Therapy support as indicated  - Manage/alleviate anxiety  - Monitor for signs/symptoms of CO2 retention  Outcome: Progressing     Problem: PAIN - ADULT  Goal: Verbalizes/displays adequate comfort level or patient's stated pain goal  Description: INTERVENTIONS:  - Encourage pt to monitor pain and request assistance  - Assess pain using appropriate pain scale  - Administer analgesics based on type and severity of pain and evaluate response  - Implement non-pharmacological measures as appropriate and evaluate response  - Consider cultural and social influences on pain and pain management  - Manage/alleviate anxiety  - Utilize distraction and/or relaxation techniques  - Monitor for opioid side effects  - Notify MD/LIP if interventions unsuccessful or patient reports new pain  - Anticipate increased pain with activity and pre-medicate as appropriate  Outcome: Progressing     Problem: SAFETY ADULT - FALL  Goal: Free from fall injury  Description: INTERVENTIONS:  - Assess pt frequently for physical needs  - Identify cognitive and  physical deficits and behaviors that affect risk of falls.  - [place] fall precautions as indicated by assessment.  - Educate pt/family on patient safety including physical limitations  - Instruct pt to call for assistance with activity based on assessment  - Modify environment to reduce risk of injury  - Provide assistive devices as appropriate  - Consider OT/PT consult to assist with strengthening/mobility  - Encourage toileting schedule  Outcome: Progressing     Problem: DISCHARGE PLANNING  Goal: Discharge to home or other facility with appropriate resources  Description: INTERVENTIONS:  - Identify barriers to discharge w/pt and caregiver  - Include patient/family/discharge partner in discharge planning  - Arrange for needed discharge resources and transportation as appropriate  - Identify discharge learning needs (meds, wound care, etc)  - Arrange for interpreters to assist at discharge as needed  - Consider post-discharge preferences of patient/family/discharge partner  - Complete POLST form as appropriate  - Assess patient's ability to be responsible for managing their own health  - Refer to Case Management Department for coordinating discharge planning if the patient needs post-hospital services based on physician/LIP order or complex needs related to functional status, cognitive ability or social support system  Outcome: Progressing

## 2024-05-03 NOTE — PLAN OF CARE
Problem: Patient Centered Care  Goal: Patient preferences are identified and integrated in the patient's plan of care  Description: Interventions:  - What would you like us to know as we care for you?   - Provide timely, complete, and accurate information to patient/family  - Incorporate patient and family knowledge, values, beliefs, and cultural backgrounds into the planning and delivery of care  - Encourage patient/family to participate in care and decision-making at the level they choose  - Honor patient and family perspectives and choices  Outcome: Progressing     Problem: Patient/Family Goals  Goal: Patient/Family Long Term Goal  Description: Patient's Long Term Goal:     Interventions:  - See additional Care Plan goals for specific interventions  Outcome: Progressing  Goal: Patient/Family Short Term Goal  Description: Patient's Short Term Goal:    Interventions:   - See additional Care Plan goals for specific interventions  Outcome: Progressing     Problem: CARDIOVASCULAR - ADULT  Goal: Maintains optimal cardiac output and hemodynamic stability  Description: INTERVENTIONS:  - Monitor vital signs, rhythm, and trends  - Monitor for bleeding, hypotension and signs of decreased cardiac output  - Evaluate effectiveness of vasoactive medications to optimize hemodynamic stability  - Monitor arterial and/or venous puncture sites for bleeding and/or hematoma  - Assess quality of pulses, skin color and temperature  - Assess for signs of decreased coronary artery perfusion - ex. Angina  - Evaluate fluid balance, assess for edema, trend weights  Outcome: Progressing  Goal: Absence of cardiac arrhythmias or at baseline  Description: INTERVENTIONS:  - Continuous cardiac monitoring, monitor vital signs, obtain 12 lead EKG if indicated  - Evaluate effectiveness of antiarrhythmic and heart rate control medications as ordered  - Initiate emergency measures for life threatening arrhythmias  - Monitor electrolytes and administer  replacement therapy as ordered  Outcome: Progressing     Problem: RESPIRATORY - ADULT  Goal: Achieves optimal ventilation and oxygenation  Description: INTERVENTIONS:  - Assess for changes in respiratory status  - Assess for changes in mentation and behavior  - Position to facilitate oxygenation and minimize respiratory effort  - Oxygen supplementation based on oxygen saturation or ABGs  - Provide Smoking Cessation handout, if applicable  - Encourage broncho-pulmonary hygiene including cough, deep breathe, Incentive Spirometry  - Assess the need for suctioning and perform as needed  - Assess and instruct to report SOB or any respiratory difficulty  - Respiratory Therapy support as indicated  - Manage/alleviate anxiety  - Monitor for signs/symptoms of CO2 retention  Outcome: Progressing     Problem: PAIN - ADULT  Goal: Verbalizes/displays adequate comfort level or patient's stated pain goal  Description: INTERVENTIONS:  - Encourage pt to monitor pain and request assistance  - Assess pain using appropriate pain scale  - Administer analgesics based on type and severity of pain and evaluate response  - Implement non-pharmacological measures as appropriate and evaluate response  - Consider cultural and social influences on pain and pain management  - Manage/alleviate anxiety  - Utilize distraction and/or relaxation techniques  - Monitor for opioid side effects  - Notify MD/LIP if interventions unsuccessful or patient reports new pain  - Anticipate increased pain with activity and pre-medicate as appropriate  Outcome: Progressing     Problem: SAFETY ADULT - FALL  Goal: Free from fall injury  Description: INTERVENTIONS:  - Assess pt frequently for physical needs  - Identify cognitive and physical deficits and behaviors that affect risk of falls.  - [place] fall precautions as indicated by assessment.  - Educate pt/family on patient safety including physical limitations  - Instruct pt to call for assistance with activity  based on assessment  - Modify environment to reduce risk of injury  - Provide assistive devices as appropriate  - Consider OT/PT consult to assist with strengthening/mobility  - Encourage toileting schedule  Outcome: Progressing     Problem: DISCHARGE PLANNING  Goal: Discharge to home or other facility with appropriate resources  Description: INTERVENTIONS:  - Identify barriers to discharge w/pt and caregiver  - Include patient/family/discharge partner in discharge planning  - Arrange for needed discharge resources and transportation as appropriate  - Identify discharge learning needs (meds, wound care, etc)  - Arrange for interpreters to assist at discharge as needed  - Consider post-discharge preferences of patient/family/discharge partner  - Complete POLST form as appropriate  - Assess patient's ability to be responsible for managing their own health  - Refer to Case Management Department for coordinating discharge planning if the patient needs post-hospital services based on physician/LIP order or complex needs related to functional status, cognitive ability or social support system  Outcome: Progressing

## 2024-05-03 NOTE — CDS QUERY
CLINICAL DOCUMENTATION CLARIFICATION FORM  Dear Doctor: Jerson    Please clarify the type and acuity of the CHF? Thank you     PLEASE (X) ALL DIAGNOSES THAT APPLY.    (    ) Acute Systolic Heart Failure   (    ) Acute on Chronic Systolic Heart Failure    (    ) Chronic Systolic Heart Failure      (    ) Acute Diastolic Heart Failure  (    ) Acute on Chronic Diastolic Heart Failure   (    ) Chronic Diastolic Heart Failure       (    ) Acute combined systolic and diastolic Heart Failure  (    ) Acute on chronic combined systolic and diastolic Heart Failure  (    ) Chronic combined systolic and diastolic Heart Failure     (  x  ) Other - please specify: Acute HFpEF  (    ) The patient does not have CHF         Documentation from the Medical Record:     ED Provider- patient with evidence of heart failure, troponin elevated but decreased compared to prior as in CK    5/1/2024--Echo with EF 60-65%   5/1/24-- BNP 2779  Cardio consult \"acute on chronic chf\"   Currently not on IV fluids trying to be cautious with fluid overload   PMH- COPD, pulmonary artery htn  5/2/24-- PN \"fluid overload, repeat echo\"      If you have any questions, please contact Clinical :  Anson SUMNER RN at [phone]     Thank You!     THIS FORM IS A PERMANENT PART OF THE MEDICAL RECORD

## 2024-05-03 NOTE — DISCHARGE INSTRUCTIONS
[facility] to follow at discharge. Please call [facility] with any questions, they can be reached by phone at [phone].   Follow-up with PCP in 1-2 weeks  Follow-up with community [facility]  Call MD with any concerns

## 2024-05-04 NOTE — PLAN OF CARE
Problem: Patient Centered Care  Goal: Patient preferences are identified and integrated in the patient's plan of care  Description: Interventions:  - What would you like us to know as we care for you?   - Provide timely, complete, and accurate information to patient/family  - Incorporate patient and family knowledge, values, beliefs, and cultural backgrounds into the planning and delivery of care  - Encourage patient/family to participate in care and decision-making at the level they choose  - Honor patient and family perspectives and choices  Outcome: Progressing     Problem: Patient/Family Goals  Goal: Patient/Family Long Term Goal  Description: Patient's Long Term Goal:    Interventions:  - See additional Care Plan goals for specific interventions  Outcome: Progressing  Goal: Patient/Family Short Term Goal  Description: Patient's Short Term Goal:     Interventions:   - See additional Care Plan goals for specific interventions  Outcome: Progressing     Problem: CARDIOVASCULAR - ADULT  Goal: Maintains optimal cardiac output and hemodynamic stability  Description: INTERVENTIONS:  - Monitor vital signs, rhythm, and trends  - Monitor for bleeding, hypotension and signs of decreased cardiac output  - Evaluate effectiveness of vasoactive medications to optimize hemodynamic stability  - Monitor arterial and/or venous puncture sites for bleeding and/or hematoma  - Assess quality of pulses, skin color and temperature  - Assess for signs of decreased coronary artery perfusion - ex. Angina  - Evaluate fluid balance, assess for edema, trend weights  Outcome: Progressing  Goal: Absence of cardiac arrhythmias or at baseline  Description: INTERVENTIONS:  - Continuous cardiac monitoring, monitor vital signs, obtain 12 lead EKG if indicated  - Evaluate effectiveness of antiarrhythmic and heart rate control medications as ordered  - Initiate emergency measures for life threatening arrhythmias  - Monitor electrolytes and administer  replacement therapy as ordered  Outcome: Progressing     Problem: RESPIRATORY - ADULT  Goal: Achieves optimal ventilation and oxygenation  Description: INTERVENTIONS:  - Assess for changes in respiratory status  - Assess for changes in mentation and behavior  - Position to facilitate oxygenation and minimize respiratory effort  - Oxygen supplementation based on oxygen saturation or ABGs  - Provide Smoking Cessation handout, if applicable  - Encourage broncho-pulmonary hygiene including cough, deep breathe, Incentive Spirometry  - Assess the need for suctioning and perform as needed  - Assess and instruct to report SOB or any respiratory difficulty  - Respiratory Therapy support as indicated  - Manage/alleviate anxiety  - Monitor for signs/symptoms of CO2 retention  Outcome: Progressing     Problem: PAIN - ADULT  Goal: Verbalizes/displays adequate comfort level or patient's stated pain goal  Description: INTERVENTIONS:  - Encourage pt to monitor pain and request assistance  - Assess pain using appropriate pain scale  - Administer analgesics based on type and severity of pain and evaluate response  - Implement non-pharmacological measures as appropriate and evaluate response  - Consider cultural and social influences on pain and pain management  - Manage/alleviate anxiety  - Utilize distraction and/or relaxation techniques  - Monitor for opioid side effects  - Notify MD/LIP if interventions unsuccessful or patient reports new pain  - Anticipate increased pain with activity and pre-medicate as appropriate  Outcome: Progressing     Problem: SAFETY ADULT - FALL  Goal: Free from fall injury  Description: INTERVENTIONS:  - Assess pt frequently for physical needs  - Identify cognitive and physical deficits and behaviors that affect risk of falls.  - [place] fall precautions as indicated by assessment.  - Educate pt/family on patient safety including physical limitations  - Instruct pt to call for assistance with activity  based on assessment  - Modify environment to reduce risk of injury  - Provide assistive devices as appropriate  - Consider OT/PT consult to assist with strengthening/mobility  - Encourage toileting schedule  Outcome: Progressing     Problem: DISCHARGE PLANNING  Goal: Discharge to home or other facility with appropriate resources  Description: INTERVENTIONS:  - Identify barriers to discharge w/pt and caregiver  - Include patient/family/discharge partner in discharge planning  - Arrange for needed discharge resources and transportation as appropriate  - Identify discharge learning needs (meds, wound care, etc)  - Arrange for interpreters to assist at discharge as needed  - Consider post-discharge preferences of patient/family/discharge partner  - Complete POLST form as appropriate  - Assess patient's ability to be responsible for managing their own health  - Refer to Case Management Department for coordinating discharge planning if the patient needs post-hospital services based on physician/LIP order or complex needs related to functional status, cognitive ability or social support system  Outcome: Progressing

## 2024-05-04 NOTE — CM/SW NOTE
AMBER was informed that patient is medically stable to DC to [facility]. AMBER followed up with E liaison to confirm bed avail. Todd will follow up with AMBER in regard to bed avail.        05/04/24 1100   Discharge disposition   Expected discharge disposition subacute   Post Acute Care Provider   (Ruthy Hernandez [place])   Discharge transportation [facility]     1154am- AMBER was informed by [facility]- Todd that they have a bed avail for patient today. AMBER updated team. Pt requires an ambulance according to the RN due to being a max assist. AMBER called and ordered an Ambulance from [facility] to transport pt to [facility]  at 4:00 PM.  PCS flow sheet completed. RN to attached to AVS and print out.  RN to call report to Collin/EL at [phone].    Plan: DC to E, [place] [facility] @ 4pm    AMBER/GARRETT to remain available for support and/or discharge planning.     Tiffany Blue, MSW, LSW   x [phone]

## 2024-05-04 NOTE — PLAN OF CARE
Patient is alert and oriented x3 and forgetful at times. Hard of hearing. Is on 3L of O2. PRN pain medication given. Stool softener given. Frequent rounding done throughout the shift. Safety precautions in place. Report given to Maria Victoria HENSLEY from [facility].    Problem: Patient Centered Care  Goal: Patient preferences are identified and integrated in the patient's plan of care  Description: Interventions:  - What would you like us to know as we care for you?   - Provide timely, complete, and accurate information to patient/family  - Incorporate patient and family knowledge, values, beliefs, and cultural backgrounds into the planning and delivery of care  - Encourage patient/family to participate in care and decision-making at the level they choose  - Honor patient and family perspectives and choices  Outcome: Completed     Problem: Patient/Family Goals  Goal: Patient/Family Long Term Goal  Description: Patient's Long Term Goal:     Interventions:  -   - See additional Care Plan goals for specific interventions  Outcome: Completed  Goal: Patient/Family Short Term Goal  Description: Patient's Short Term Goal:     Interventions:   -   - See additional Care Plan goals for specific interventions  Outcome: Completed     Problem: CARDIOVASCULAR - ADULT  Goal: Maintains optimal cardiac output and hemodynamic stability  Description: INTERVENTIONS:  - Monitor vital signs, rhythm, and trends  - Monitor for bleeding, hypotension and signs of decreased cardiac output  - Evaluate effectiveness of vasoactive medications to optimize hemodynamic stability  - Monitor arterial and/or venous puncture sites for bleeding and/or hematoma  - Assess quality of pulses, skin color and temperature  - Assess for signs of decreased coronary artery perfusion - ex. Angina  - Evaluate fluid balance, assess for edema, trend weights  Outcome: Completed  Goal: Absence of cardiac arrhythmias or at baseline  Description: INTERVENTIONS:  -  Continuous cardiac monitoring, monitor vital signs, obtain 12 lead EKG if indicated  - Evaluate effectiveness of antiarrhythmic and heart rate control medications as ordered  - Initiate emergency measures for life threatening arrhythmias  - Monitor electrolytes and administer replacement therapy as ordered  Outcome: Completed     Problem: RESPIRATORY - ADULT  Goal: Achieves optimal ventilation and oxygenation  Description: INTERVENTIONS:  - Assess for changes in respiratory status  - Assess for changes in mentation and behavior  - Position to facilitate oxygenation and minimize respiratory effort  - Oxygen supplementation based on oxygen saturation or ABGs  - Provide Smoking Cessation handout, if applicable  - Encourage broncho-pulmonary hygiene including cough, deep breathe, Incentive Spirometry  - Assess the need for suctioning and perform as needed  - Assess and instruct to report SOB or any respiratory difficulty  - Respiratory Therapy support as indicated  - Manage/alleviate anxiety  - Monitor for signs/symptoms of CO2 retention  Outcome: Completed     Problem: PAIN - ADULT  Goal: Verbalizes/displays adequate comfort level or patient's stated pain goal  Description: INTERVENTIONS:  - Encourage pt to monitor pain and request assistance  - Assess pain using appropriate pain scale  - Administer analgesics based on type and severity of pain and evaluate response  - Implement non-pharmacological measures as appropriate and evaluate response  - Consider cultural and social influences on pain and pain management  - Manage/alleviate anxiety  - Utilize distraction and/or relaxation techniques  - Monitor for opioid side effects  - Notify MD/LIP if interventions unsuccessful or patient reports new pain  - Anticipate increased pain with activity and pre-medicate as appropriate  Outcome: Completed     Problem: SAFETY ADULT - FALL  Goal: Free from fall injury  Description: INTERVENTIONS:  - Assess pt frequently for physical  needs  - Identify cognitive and physical deficits and behaviors that affect risk of falls.  - [place] fall precautions as indicated by assessment.  - Educate pt/family on patient safety including physical limitations  - Instruct pt to call for assistance with activity based on assessment  - Modify environment to reduce risk of injury  - Provide assistive devices as appropriate  - Consider OT/PT consult to assist with strengthening/mobility  - Encourage toileting schedule  Outcome: Completed     Problem: DISCHARGE PLANNING  Goal: Discharge to home or other facility with appropriate resources  Description: INTERVENTIONS:  - Identify barriers to discharge w/pt and caregiver  - Include patient/family/discharge partner in discharge planning  - Arrange for needed discharge resources and transportation as appropriate  - Identify discharge learning needs (meds, wound care, etc)  - Arrange for interpreters to assist at discharge as needed  - Consider post-discharge preferences of patient/family/discharge partner  - Complete POLST form as appropriate  - Assess patient's ability to be responsible for managing their own health  - Refer to Case Management Department for coordinating discharge planning if the patient needs post-hospital services based on physician/LIP order or complex needs related to functional status, cognitive ability or social support system  Outcome: Completed

## 2024-05-04 NOTE — PROGRESS NOTES
Progress Note  Lina Dudley Patient Status:  Inpatient    1926 MRN [id number]   Location [facility] 3W/SW Attending Demi Arthur MD   Hosp Day # 3 PCP ROBYN KULKARNI MD     Subjective:  Pt stated she has been constipated so doesn't feel good.     Objective:  /75 (BP Location: Right arm)   Pulse 84   Temp 97.8 °F (36.6 °C) (Oral)   Resp 20   Ht 5' 5\" (1.651 m)   Wt 113 lb 8 oz (51.5 kg)   SpO2 95%   BMI 18.89 kg/m²     Telemetry: SR with PAC's      Intake/Output:    Intake/Output Summary (Last 24 hours) at 2024 0926  Last data filed at 2024 0444  Gross per 24 hour   Intake 280 ml   Output 1001 ml   Net -721 ml       Last 3 Weights   24 0444 113 lb 8 oz (51.5 kg)   24 0539 112 lb 14.4 oz (51.2 kg)   24 0320 112 lb 11.2 oz (51.1 kg)   24 1518 112 lb 11.2 oz (51.1 kg)   24 0958 120 lb (54.4 kg)   23 1750 105 lb (47.6 kg)   23 1120 116 lb (52.6 kg)       Labs:  Recent Labs   Lab 24  0724  07524  0426   * 116* 112*   BUN 67* 45* 63*   CREATSERUM 1.23* 1.08* 1.25*   EGFRCR 40* 47* 39*   CA 9.0 9.1 8.9    133* 135*   K 4.1 4.9 4.3    105 103   CO2 25.0 18.0* 24.0     Recent Labs   Lab 24  07524  0426   RBC 4.72 4.56 4.39   HGB 14.9 15.7 14.4   HCT 45.9 45.3 42.5   MCV 97.2 99.3 96.8   MCH 31.6 34.4* 32.8   MCHC 32.5 34.7 33.9   RDW 14.3 15.4* 15.0   NEPRELIM 8.02* 7.47 9.74*   WBC 10.1 9.6 12.3*   .0 199.0 224.0         Recent Labs   Lab 24  1014 24  1755 24  2103   TROPHS 166* 171* 161*   *  --   --      No results found for: \"PT\", \"INR\"    Diagnostics:       Review of Systems   Respiratory: Negative.     Cardiovascular: Negative.        Physical Exam:    Physical Exam  Vitals reviewed.   Constitutional:       General: She is not in acute distress.     Appearance: She is not ill-appearing.   Neck:      Vascular: No JVD.    Cardiovascular:      Rate and Rhythm: Normal rate and regular rhythm.      Pulses:           Radial pulses are 2+ on the right side and 2+ on the left side.        Dorsalis pedis pulses are 2+ on the right side and 2+ on the left side.      Heart sounds: Murmur heard.      Systolic murmur is present with a grade of 2/6.      No friction rub. No gallop.   Pulmonary:      Effort: No respiratory distress.      Breath sounds: Normal breath sounds. No stridor. No wheezing, rhonchi or rales.      Comments:   On 3L NC  Chest:      Chest wall: No tenderness.   Abdominal:      General: Abdomen is flat.   Musculoskeletal:      Right lower leg: No edema.      Left lower leg: No edema.   Skin:     General: Skin is warm and dry.   Neurological:      General: No focal deficit present.      Mental Status: She is alert and oriented to person, place, and time.   Psychiatric:         Mood and Affect: Mood normal.         Medications:   fluticasone furoate-vilanterol  1 puff Inhalation Daily    ceFAZolin  2 g Intravenous Q12H    heparin  5,000 Units Subcutaneous 2 times per day    atorvastatin  10 mg Oral Nightly         Assessment/Plan:    Generalized weakness and s/p fall   - pt was found on floor by family member  - CT of cervical spine without any acute changes   - Ct of brain negative for any acute changes     Chronic respiratory failure secondary to COPD/ ILD/ HFpEF  - on 3L NC now chronically on 1L at home per pt  - chest xray: emphysematous changes, slight progression of diffuse interstitial prominence, borderline cardiomegaly, prominent central vasculature  - inhalers per PCP  - euvolemic on exam  - BNP 2779  - echo from 5/2 with LVEF 60-65, no RWMA, moderate MR, Severe TR, PASP 69mmHg  -pulmonary following     Chronically Elevated trop  - 161>171>166  - without any symptoms of CP, dyspnea  - EKG without acute changes   - possibly secondary to demand ischemia      UTI  - pt was noted with positive UA  - on IV antibiotics       QUITA  - baseline creatinine of ~ 0.8 on admission 1.32  - creatinine of 1.21today     HLD  - LDL of 94  - on statin     Pulmonary artery Hypertension  - will start her on lasix 20mg oral  -  may need to consider sildenafil in the future if blood pressure improves    Plan:  - continue atorvastatin  - Euvolemic on exam,   - will stop lasix with creatinine trending up  - may need to elevate as OP for valvular disease: Moderate MR, Severe TR   - further management per pulmonary and primary    Resmi TAISHA Mata  [facility] [place]  5/4/2024  9:26 AM        =======================================================  Patient seen and examined independently.  Note reviewed and labs reviewed.  Agree with above assessment and plan except as noted below.    Plan:  Appears to be euvolemic on examination  Recommend outpatient follow-up  Will sign off, call with any questions      Ryder Guardado MD        2

## 2024-05-04 NOTE — DISCHARGE SUMMARY
[facility]  part of [facility]    Discharge Summary    Lina Dudley Patient Status:  Inpatient    1926 MRN [id number]   Location [facility] 3W/SW Attending Demi Arthur MD   Hosp Day # 3 PCP ROBYN KULKARNI MD     Date of Admission: 2024 Disposition: SNF    Date of Discharge: 24      Admitting Diagnosis: Fall, initial encounter [W19.XXXA]  Acute on chronic respiratory failure with hypoxia (HCC) [J96.21]  Heart failure, unspecified HF chronicity, unspecified heart failure type (HCC) [I50.9]    Hospital Discharge Diagnoses:  Fall    Lace+ Score: 64  59-90 High Risk  29-58 Medium Risk  0-28   Low Risk.    TCM Follow-Up Recommendation:  LACE > 58: High Risk of readmission after discharge from the hospital.      Problem List:   Patient Active Problem List   Diagnosis    Sciatica of right side    Mixed hyperlipidemia    Medicare annual wellness visit, subsequent    Chronic obstructive pulmonary disease (HCC)    Macular degeneration    Presbyopia    Venous insufficiency    Osteoarthritis    Urinary frequency    Constipation    Elevated troponin I level    Fall, initial encounter    Closed fracture of ninth thoracic vertebra, unspecified fracture morphology, initial encounter (HCC)    Protein-calorie malnutrition, unspecified severity (HCC)    Heart failure, unspecified HF chronicity, unspecified heart failure type (HCC)    Acute on chronic respiratory failure with hypoxia (HCC)    Goals of care, counseling/discussion    Advance care planning    Palliative care by specialist       Reason for Admission:   #Weakness   #UTI  #QUITA  #ILD  #Ch. Elevated Troponin  # Fluid overload  #acute HFpEF  #HL     Physical Exam:   General appearance: alert, appears stated age and cooperative  Pulmonary:  clear to auscultation bilaterally  Cardiovascular: S1, S2 normal, no murmur, click, rub or gallop, regular rate and rhythm  Abdominal: soft, non-tender; bowel sounds normal; no masses,   no organomegaly  Extremities: extremities normal, atraumatic, no cyanosis or edema  Psychiatric: calm      History of Present Illness:   Per Dr. Novak  Patient is a 89-year-old  female who lives by herself with underlying COPD and pulmonary artery hypertension, was found by her daughter lying on the floor in her house today. Patient reported that she fell last night. Did not hit her head, but she could not get up because her legs were giving out. She decided to lie on the floor until she was found by her daughter this morning. In the emergency room, urinalysis showed evidence of urinary tract infection. CBC showed white blood cell count of 12.2 with left shift. Chemistry showed GFR 37, which is below her baseline. CK was 175. B-natriuretic peptide 2779. Patient had a chest x-ray which showed emphysema with chronic prominent pulmonary interstitium, slight progression of diffuse interstitial prominence may reflect progression of pulmonary interstitial fibrosis versus atypical viral pneumonia. CT scan of the cervical spine and CT scan of the brain, besides osteoarthritis, no acute findings. Patient will be admitted to the hospital for further management.     Hospital Course:   #Weakness   #UTI  -started iv rocephin--> switch to iv cefazolin  -follow-up cultures-->klebsiella, sensitive to cefazolin plan duricef on dc to complete tx.  -pt/ot will need rheab     #QUITA  -creatinine baseline ~0.6-->1.32-->1.23  -not on fluids with elevated bnp and fluid overload  -cautious diuresis     #ILD  -no requiring more oxygen  - pulm eval treated increased oxygen level given age and clinical findings continue supplemental oxygen and will not pursue further testing     #Ch. Elevated Troponin  # Fluid overload  #acute HFpEF  -BNP elevated at 2779  -repeat echo--> showed normal EF of 60 to 65%, shows severe tricuspid regurgitation pulmonary hypertension with 60 mmHg  -cardiology consultation appreciated  -Gentle diuresis      HL       Consultations:   Cardiology  Pulmonology  Palliative care    Procedures: n/a    Complications: n/a    Discharge Condition: Good    Discharge Medications:      Discharge Medications        START taking these medications        Instructions Prescription details   docusate sodium 100 MG Caps  Commonly known as: COLACE      Take 100 mg by mouth 2 (two) times daily.   Refills: 0     ipratropium-albuterol 0.5-2.5 (3) MG/3ML Soln  Commonly known as: Duoneb      Take 3 mL by nebulization every 6 (six) hours as needed.   Refills: 0     Polyethylene Glycol 3350 17 g Pack  Commonly known as: MIRALAX      Take 17 g by mouth daily as needed.   Refills: 0            CONTINUE taking these medications        Instructions Prescription details   acetaminophen 500 MG Tabs  Commonly known as: Tylenol Extra Strength      Take 1 tablet (500 mg total) by mouth every 4 (four) hours as needed for Fever (equal to or greater than 100.4).   Refills: 0     albuterol 108 (90 Base) MCG/ACT Aers  Commonly known as: Ventolin HFA      inhale 2 puff by inhalation route  every 4 - 6 hours as needed   Quantity: 1 each  Refills: 5     atorvastatin 10 MG Tabs  Commonly known as: Lipitor      Take 1 tablet (10 mg total) by mouth nightly.   Quantity: 30 tablet  Refills: 5     cefadroxil 500 MG Caps  Commonly known as: DURICEF      Take 1 capsule (500 mg total) by mouth 2 (two) times daily.   Refills: 0     fluticasone furoate-vilanterol 100-25 MCG/ACT Aepb  Commonly known as: Breo Ellipta      Inhale 1 puff into the lungs every morning. To follow with gargle, rinse, & spit after using BREO to help reduce your chance of getting thrush.   Quantity: 1 each  Refills: 5              Follow up Visits: Follow-up with pcp in 1 week    Follow up Labs: n/a     Other Discharge Instructions: community palliative care    BAUTISTA LUND MD  5/4/2024  12:28 PM    > 35 min

## 2024-05-05 NOTE — PROGRESS NOTES
[facility]  part of Located within [facility]    Progress Note      Assessment and Plan:   1.  Chronic respiratory failure-the patient has COPD with possible mild early interstitial lung disease.    Recommendations: Follow-up with me in the clinic, Wixela, albuterol, contact me promptly if new trouble, ongoing supplemental oxygen.    Subjective:   Lina Dudley is a(n) 89 year old female who is breathing a little better    Objective:   Blood pressure 100/81, pulse 76, temperature 97.5 °F (36.4 °C), temperature source Oral, resp. rate 20, height 5' 5\" (1.651 m), weight 113 lb 8 oz (51.5 kg), SpO2 94%, not currently breastfeeding.    Physical Exam alert white female  HEENT examination is unremarkable with pupils equal round and reactive to light and accommodation.   Neck without adenopathy, thyromegaly, JVD nor bruit.   Lungs diminished to auscultation and percussion.  Cardiac regular rate and rhythm no murmur.   Abdomen nontender, without hepatosplenomegaly and no mass appreciable.   Extremities without clubbing cyanosis nor edema.   Neurologic grossly intact with symmetric tone and strength and reflex.  Skin without gross abnormality     Results:     Lab Results   Component Value Date    WBC 12.3 05/04/2024    HGB 14.4 05/04/2024    HCT 42.5 05/04/2024    .0 05/04/2024    CREATSERUM 1.25 05/04/2024    BUN 63 05/04/2024     05/04/2024    K 4.3 05/04/2024     05/04/2024    CO2 24.0 05/04/2024     05/04/2024    CA 8.9 05/04/2024    ALB 3.4 05/04/2024    ALKPHO 305 05/04/2024    BILT 0.9 05/04/2024    TP 5.8 05/04/2024    AST 83 05/04/2024     05/04/2024    MG 2.6 05/04/2024       Placido Hutchins MD  Medical Director, Critical Care, [facility]  Medical Director, [facility]  Pager: [phone]

## 2024-05-06 ENCOUNTER — HOSPITAL ENCOUNTER (INPATIENT)
Facility: HOSPITAL | Age: 89
LOS: 3 days | Discharge: NURSING, REHAB OR LONG-TERM CARE | DRG: 291 | End: 2024-05-09
Attending: EMERGENCY MEDICINE | Admitting: INTERNAL MEDICINE
Payer: MEDICARE

## 2024-05-06 ENCOUNTER — INITIAL APN SNF VISIT (OUTPATIENT)
Dept: INTERNAL MEDICINE CLINIC | Facility: SKILLED NURSING FACILITY | Age: 89
End: 2024-05-06

## 2024-05-06 ENCOUNTER — APPOINTMENT (OUTPATIENT)
Dept: GENERAL RADIOLOGY | Facility: HOSPITAL | Age: 89
DRG: 291 | End: 2024-05-06
Attending: EMERGENCY MEDICINE
Payer: MEDICARE

## 2024-05-06 ENCOUNTER — APPOINTMENT (OUTPATIENT)
Dept: GENERAL RADIOLOGY | Facility: HOSPITAL | Age: 89
DRG: 291 | End: 2024-05-06
Attending: HOSPITALIST
Payer: MEDICARE

## 2024-05-06 DIAGNOSIS — I50.9 ACUTE ON CHRONIC CONGESTIVE HEART FAILURE, UNSPECIFIED HEART FAILURE TYPE (HCC): Primary | ICD-10-CM

## 2024-05-06 DIAGNOSIS — N30.00 ACUTE CYSTITIS WITHOUT HEMATURIA: ICD-10-CM

## 2024-05-06 DIAGNOSIS — I48.0 PAROXYSMAL ATRIAL FIBRILLATION (HCC): ICD-10-CM

## 2024-05-06 DIAGNOSIS — N17.9 AKI (ACUTE KIDNEY INJURY) (HCC): ICD-10-CM

## 2024-05-06 DIAGNOSIS — J96.21 ACUTE ON CHRONIC RESPIRATORY FAILURE WITH HYPOXIA (HCC): ICD-10-CM

## 2024-05-06 DIAGNOSIS — E78.2 MIXED HYPERLIPIDEMIA: ICD-10-CM

## 2024-05-06 DIAGNOSIS — I50.9 HEART FAILURE, UNSPECIFIED HF CHRONICITY, UNSPECIFIED HEART FAILURE TYPE (HCC): ICD-10-CM

## 2024-05-06 DIAGNOSIS — E87.5 HYPERKALEMIA: ICD-10-CM

## 2024-05-06 DIAGNOSIS — J42 CHRONIC BRONCHITIS, UNSPECIFIED CHRONIC BRONCHITIS TYPE (HCC): Primary | ICD-10-CM

## 2024-05-06 LAB
ALBUMIN SERPL-MCNC: 3.5 G/DL (ref 3.2–4.8)
ALBUMIN/GLOB SERPL: 1.5 {RATIO} (ref 1–2)
ALP LIVER SERPL-CCNC: 323 U/L
ALT SERPL-CCNC: 53 U/L
ANION GAP SERPL CALC-SCNC: 13 MMOL/L (ref 0–18)
AST SERPL-CCNC: 141 U/L (ref ?–34)
BASOPHILS # BLD AUTO: 0.03 X10(3) UL (ref 0–0.2)
BASOPHILS NFR BLD AUTO: 0.2 %
BILIRUB SERPL-MCNC: 1.5 MG/DL (ref 0.2–0.9)
BNP SERPL-MCNC: 3178 PG/ML
BUN BLD-MCNC: 85 MG/DL (ref 9–23)
BUN/CREAT SERPL: 49.1 (ref 10–20)
CALCIUM BLD-MCNC: 8.9 MG/DL (ref 8.7–10.4)
CHLORIDE SERPL-SCNC: 98 MMOL/L (ref 98–112)
CO2 SERPL-SCNC: 19 MMOL/L (ref 21–32)
CREAT BLD-MCNC: 1.73 MG/DL
DEPRECATED RDW RBC AUTO: 49.9 FL (ref 35.1–46.3)
EGFRCR SERPLBLD CKD-EPI 2021: 27 ML/MIN/1.73M2 (ref 60–?)
EOSINOPHIL # BLD AUTO: 0.01 X10(3) UL (ref 0–0.7)
EOSINOPHIL NFR BLD AUTO: 0.1 %
ERYTHROCYTE [DISTWIDTH] IN BLOOD BY AUTOMATED COUNT: 16.8 % (ref 11–15)
FLUAV + FLUBV RNA SPEC NAA+PROBE: NEGATIVE
FLUAV + FLUBV RNA SPEC NAA+PROBE: NEGATIVE
GLOBULIN PLAS-MCNC: 2.3 G/DL (ref 2–3.5)
GLUCOSE BLD-MCNC: 89 MG/DL (ref 70–99)
GLUCOSE BLDC GLUCOMTR-MCNC: 114 MG/DL (ref 70–99)
GLUCOSE BLDC GLUCOMTR-MCNC: 115 MG/DL (ref 70–99)
GLUCOSE BLDC GLUCOMTR-MCNC: 81 MG/DL (ref 70–99)
GLUCOSE BLDC GLUCOMTR-MCNC: 91 MG/DL (ref 70–99)
HCT VFR BLD AUTO: 45 %
HGB BLD-MCNC: 15.1 G/DL
IMM GRANULOCYTES # BLD AUTO: 0.08 X10(3) UL (ref 0–1)
IMM GRANULOCYTES NFR BLD: 0.5 %
LYMPHOCYTES # BLD AUTO: 1.04 X10(3) UL (ref 1–4)
LYMPHOCYTES NFR BLD AUTO: 6.6 %
MCH RBC QN AUTO: 32.3 PG (ref 26–34)
MCHC RBC AUTO-ENTMCNC: 33.6 G/DL (ref 31–37)
MCV RBC AUTO: 96.2 FL
MONOCYTES # BLD AUTO: 1.39 X10(3) UL (ref 0.1–1)
MONOCYTES NFR BLD AUTO: 8.8 %
NEUTROPHILS # BLD AUTO: 13.16 X10 (3) UL (ref 1.5–7.7)
NEUTROPHILS # BLD AUTO: 13.16 X10(3) UL (ref 1.5–7.7)
NEUTROPHILS NFR BLD AUTO: 83.8 %
OSMOLALITY SERPL CALC.SUM OF ELEC: 295 MOSM/KG (ref 275–295)
PLATELET # BLD AUTO: 233 10(3)UL (ref 150–450)
POTASSIUM SERPL-SCNC: 6.1 MMOL/L (ref 3.5–5.1)
PROT SERPL-MCNC: 5.8 G/DL (ref 5.7–8.2)
RBC # BLD AUTO: 4.68 X10(6)UL
RSV RNA SPEC NAA+PROBE: NEGATIVE
SARS-COV-2 RNA RESP QL NAA+PROBE: NOT DETECTED
SODIUM SERPL-SCNC: 130 MMOL/L (ref 136–145)
TROPONIN I SERPL HS-MCNC: 58 NG/L
WBC # BLD AUTO: 15.7 X10(3) UL (ref 4–11)

## 2024-05-06 PROCEDURE — 99223 1ST HOSP IP/OBS HIGH 75: CPT | Performed by: HOSPITALIST

## 2024-05-06 PROCEDURE — 74018 RADEX ABDOMEN 1 VIEW: CPT | Performed by: HOSPITALIST

## 2024-05-06 PROCEDURE — 71045 X-RAY EXAM CHEST 1 VIEW: CPT | Performed by: EMERGENCY MEDICINE

## 2024-05-06 RX ORDER — DEXTROSE MONOHYDRATE 25 G/50ML
50 INJECTION, SOLUTION INTRAVENOUS ONCE
Status: COMPLETED | OUTPATIENT
Start: 2024-05-06 | End: 2024-05-06

## 2024-05-06 RX ORDER — FUROSEMIDE 10 MG/ML
20 INJECTION INTRAMUSCULAR; INTRAVENOUS ONCE
Status: COMPLETED | OUTPATIENT
Start: 2024-05-06 | End: 2024-05-06

## 2024-05-06 RX ORDER — HEPARIN SODIUM 5000 [USP'U]/ML
5000 INJECTION, SOLUTION INTRAVENOUS; SUBCUTANEOUS EVERY 12 HOURS SCHEDULED
Status: DISCONTINUED | OUTPATIENT
Start: 2024-05-07 | End: 2024-05-09

## 2024-05-06 RX ORDER — ONDANSETRON 2 MG/ML
4 INJECTION INTRAMUSCULAR; INTRAVENOUS EVERY 6 HOURS PRN
Status: DISCONTINUED | OUTPATIENT
Start: 2024-05-06 | End: 2024-05-09

## 2024-05-06 RX ORDER — CALCIUM GLUCONATE 10 MG/ML
1 INJECTION, SOLUTION INTRAVENOUS ONCE
Status: COMPLETED | OUTPATIENT
Start: 2024-05-06 | End: 2024-05-06

## 2024-05-06 RX ORDER — MELATONIN
3 NIGHTLY PRN
Status: DISCONTINUED | OUTPATIENT
Start: 2024-05-06 | End: 2024-05-09

## 2024-05-06 RX ORDER — ACETAMINOPHEN 500 MG
500 TABLET ORAL EVERY 4 HOURS PRN
Status: DISCONTINUED | OUTPATIENT
Start: 2024-05-06 | End: 2024-05-09

## 2024-05-06 RX ORDER — METOCLOPRAMIDE HYDROCHLORIDE 5 MG/ML
5 INJECTION INTRAMUSCULAR; INTRAVENOUS EVERY 8 HOURS PRN
Status: DISCONTINUED | OUTPATIENT
Start: 2024-05-06 | End: 2024-05-09

## 2024-05-06 RX ORDER — ACETAMINOPHEN 500 MG
1000 TABLET ORAL ONCE
Status: COMPLETED | OUTPATIENT
Start: 2024-05-06 | End: 2024-05-06

## 2024-05-06 NOTE — PROGRESS NOTES
Lina Dudley  : 1926  Age 89 year old  female patient is admitted to [facility] for rehabilitation and strengthening.      [facility] Admission: 24 - 24    Chief complaint: Fall, acute on chronic respiratory failure with hypoxia, heart failure, QUITA, UTI    HPI  89-year-old  female who lives by herself with underlying COPD and pulmonary artery hypertension, was found by her daughter lying on the floor in her house today. Patient reported that she fell last night. Did not hit her head, but she could not get up because her legs were giving out. She decided to lie on the floor until she was found by her daughter this morning. In the emergency room, urinalysis showed evidence of urinary tract infection. CBC showed white blood cell count of 12.2 with left shift. Chemistry showed GFR 37, which is below her baseline. CK was 175. B-natriuretic peptide 2779. Patient had a chest x-ray which showed emphysema with chronic prominent pulmonary interstitium, slight progression of diffuse interstitial prominence may reflect progression of pulmonary interstitial fibrosis versus atypical viral pneumonia. CT scan of the cervical spine and CT scan of the brain, besides osteoarthritis, no acute findings. Patient stabilized and sent to [facility] for rehab    Patient seen as initial aprn visit, she is up in the bed trying to get comfortable. I helped her fix her nasal cannula and straighten her in the bed. She was appreciative. She was on 2L of O2 baseline. VSS she has no current complaints.     Patient seen again, asked by nurse. Nurse asked me to see patient because she was on 2L of O2 and at 73%. Readjusted her nasal cannula. And bumped up to 4L of O2 holding at 94%. Will order a chest xray and attempt to wean. Monitoring     Past Medical History:    Appendicitis    APPENDECTOMY    Arthritis    Basal cell carcinoma    Of Face     Chicken pox    COPD (chronic obstructive pulmonary disease)  (HCC)    Left arm numbness    Measles    Other and unspecified hyperlipidemia    Pneumonia    Tonsillitis    Unspecified essential hypertension     Past Surgical History:   Procedure Laterality Date    Appendectomy      Cataract extraction Bilateral     Dr. Tirado's office    Glaucoma surgery      Hip replacement surgery      Skin surgery  Excision of Basal cell carcinoma of face     Tonsillectomy      T&A     Family History   Problem Relation Age of Onset    Cancer Father     Hypertension Father     Heart Disease Father     Heart Disease Mother     Gastro-Intestinal Disorder Maternal Grandmother         TUBERCULOSIS    Breast Cancer Maternal Aunt         80s    Neurological Disorder Other         GREAT AUNT, EPILEPSY    Lipids Other         HYPERLIPIDEMIA    Glaucoma Neg     Diabetes Neg      Social History     Socioeconomic History    Marital status: Single   Tobacco Use    Smoking status: Former     Current packs/day: 0.00     Average packs/day: 1 pack/day for 30.0 years (30.0 ttl pk-yrs)     Types: Cigarettes     Start date: 1960     Quit date: 1990     Years since quittin.3    Smokeless tobacco: Former   Vaping Use    Vaping status: Never Used   Substance and Sexual Activity    Alcohol use: No     Alcohol/week: 2.0 standard drinks of alcohol     Types: 2 Glasses of wine per week     Comment: rare    Drug use: No   Other Topics Concern    Caffeine Concern Yes     Comment: COFFEE 3 CUPS/DAY     Social Determinants of Health     Food Insecurity: No Food Insecurity (2024)    Food Insecurity     Food Insecurity: Never true   Transportation Needs: No Transportation Needs (2024)    Transportation Needs     Lack of Transportation: No   Housing Stability: Low Risk  (2024)    Housing Stability     Housing Instability: No       IMMUNIZATIONS  Immunization History   Administered Date(s) Administered    Covid-19 Vaccine Moderna 100 mcg/0.5 ml 2021, 2021, 2021, 2022    FLUAD  High Dose 65 yr and older (83447) 10/14/2020    Fluvirin, 3 Years & >, Im 10/17/2015    HIGH DOSE FLU 65 YRS AND OLDER PRSV FREE SINGLE D (40539) FLU CLINIC 09/01/2017, 10/01/2021    Influenza 10/19/2004, 10/27/2005    Influenza Virus Vaccine, H1N1 12/23/2009    Pneumococcal (Prevnar 13) 07/10/2017    Pneumovax 23 10/23/2004        ALLERGIES:  Allergies   Allergen Reactions    Bacitracin UNKNOWN    Neosporin Original [Neomycin-Bacitracin-Polymyxin] ITCHING    Polymyxin B UNKNOWN       CODE STATUS:  DNR    ADVANCED CARE PLANNING TEAM: Will need family care plan       CURRENT MEDICATIONS - reviewed and updated on SNF EMAR       SUBJECTIVE    Patient seen as initial aprn visit, she is up in the bed trying to get comfortable. I helped her fix her nasal cannula and straighten her in the bed. She was appreciative. She was on 2L of O2 baseline. VSS she has no current complaints.     Patient seen again, asked by nurse. Nurse asked me to see patient because she was on 2L of O2 and at 73%. Readjusted her nasal cannula. And bumped up to 4L of O2 holding at 94%. Will order a chest xray and attempt to wean. Monitoring       PHYSICAL EXAM:  GENERAL HEALTHL ill appearing, frail, generalized bruising, not in distress  LINES, TUBES, DRAINS:  none  SKIN: no rashes, no suspicious lesions, warm, dry  WOUND: see wound assessment,   EYES: PERRLA, EOMI, sclera anicteric, conjunctiva normal; there is no nystagmus, no drainage from eyes  HENT: normocephalic; normal nose, no nasal drainage, mucous membranes pink, moist, pharynx no exudate, no visible cerumen.   NECK:supple, FROM; no JVD, no TMG, no carotid bruits   BREAST: deferred exam   RESPIRATORY:clear to percussion and auscultation  CARDIOVASCULAR: S1, S2 normal, RRR; no S3, no S4 and no murmur  ABDOMEN:  normal active BS+, soft, nondistended; no organomegaly, no masses; no bruits; nontender, no guarding, no rebound tenderness.  :Deferred  LYMPHATIC:no lymphedema  MUSCULOSKELETAL: no  acute synovitis upper or lower extremity   EXTREMITIES/VASCULAR:no cyanosis, clubbing or edema  NEUROLOGIC:intact; no sensorimotor deficit and follows commands  PSYCHIATRIC: alert and oriented 2-3, calm    SEE PLAN BELOW  Weakness   UTI  - follow-up cultures - klebsiella  - continue Cefadroxil 500mg BID, to be completed 05/010/24  - continue tylenol 650mg Q6PRN  - PT/OT  - monitor      QUITA  - creatinine baseline ~0.6-->1.32-->1.23  - not on fluids with elevated bnp and fluid overload  - cautious diuresis  - monitor      ILD  - currently on 4L, baseline is 2L, wean as tolerated   - repeat chest Xray on 05/06, pending  - continue Ipratropium-Albuterol Q6PRN   - continue Albuterol Sulfate HFA Q4PRN  - Pulmonary to follow in rehab  - monitor      Ch. Elevated Troponin  Fluid overload  acute HFpEF  - BNP elevated at 2779, normalized now  - repeat echo--> showed normal EF of 60 to 65%, shows severe tricuspid regurgitation pulmonary hypertension with 60 mmHg  - cardiology to follow in rehab  - Gentle diuresis     HLD  - continue atorvastatin 10mg HS  - monitor     FOLLOW UP APPOINTMENTS  Future Appointments   Date Time Provider Department Center   10/28/2024  3:15 PM Placido Hutchins MD [facility]        This is a 60 minute visit and greater than 50% of the time was spent counseling the patient and/or coordinating care. Patient is a DNAR    Note to patient: The 21st Century Cures Act makes medical notes like these available to patients in the interest of transparency. However, this is a medical document intended as peer to peer communication. It is written in medical language and may contain abbreviations or verbiage that are unfamiliar. It may appear blunt or direct. Medical documents are intended to carry relevant information, facts as evident, and the clinical opinion of the practitioner who signs the document.     Kenya Saucedo, TAISHA  05/06/24

## 2024-05-07 LAB
ANION GAP SERPL CALC-SCNC: 13 MMOL/L (ref 0–18)
BUN BLD-MCNC: 59 MG/DL (ref 9–23)
BUN/CREAT SERPL: 34.9 (ref 10–20)
CALCIUM BLD-MCNC: 8.5 MG/DL (ref 8.7–10.4)
CHLORIDE SERPL-SCNC: 100 MMOL/L (ref 98–112)
CK SERPL-CCNC: 65 U/L
CO2 SERPL-SCNC: 17 MMOL/L (ref 21–32)
CREAT BLD-MCNC: 1.69 MG/DL
DEPRECATED RDW RBC AUTO: 49.6 FL (ref 35.1–46.3)
EGFRCR SERPLBLD CKD-EPI 2021: 27 ML/MIN/1.73M2 (ref 60–?)
ERYTHROCYTE [DISTWIDTH] IN BLOOD BY AUTOMATED COUNT: 16.3 % (ref 11–15)
GLUCOSE BLD-MCNC: 85 MG/DL (ref 70–99)
GLUCOSE BLDC GLUCOMTR-MCNC: 136 MG/DL (ref 70–99)
GLUCOSE BLDC GLUCOMTR-MCNC: 168 MG/DL (ref 70–99)
GLUCOSE BLDC GLUCOMTR-MCNC: 249 MG/DL (ref 70–99)
GLUCOSE BLDC GLUCOMTR-MCNC: 86 MG/DL (ref 70–99)
HCT VFR BLD AUTO: 43.7 %
HGB BLD-MCNC: 14.6 G/DL
MAGNESIUM SERPL-MCNC: 2.8 MG/DL (ref 1.6–2.6)
MCH RBC QN AUTO: 32.4 PG (ref 26–34)
MCHC RBC AUTO-ENTMCNC: 33.4 G/DL (ref 31–37)
MCV RBC AUTO: 96.9 FL
OSMOLALITY SERPL CALC.SUM OF ELEC: 286 MOSM/KG (ref 275–295)
PLATELET # BLD AUTO: 209 10(3)UL (ref 150–450)
PLATELETS.RETICULATED NFR BLD AUTO: 6.7 % (ref 0–7)
POTASSIUM SERPL-SCNC: 4.7 MMOL/L (ref 3.5–5.1)
POTASSIUM SERPL-SCNC: 4.8 MMOL/L (ref 3.5–5.1)
POTASSIUM SERPL-SCNC: 4.9 MMOL/L (ref 3.5–5.1)
POTASSIUM SERPL-SCNC: 5 MMOL/L (ref 3.5–5.1)
POTASSIUM SERPL-SCNC: 5.1 MMOL/L (ref 3.5–5.1)
POTASSIUM SERPL-SCNC: 6 MMOL/L (ref 3.5–5.1)
Q-T INTERVAL: 344 MS
Q-T INTERVAL: 360 MS
QRS DURATION: 86 MS
QRS DURATION: 86 MS
QTC CALCULATION (BEZET): 442 MS
QTC CALCULATION (BEZET): 446 MS
R AXIS: -17 DEGREES
R AXIS: 9 DEGREES
RBC # BLD AUTO: 4.51 X10(6)UL
SODIUM SERPL-SCNC: 130 MMOL/L (ref 136–145)
T AXIS: 48 DEGREES
T AXIS: 74 DEGREES
TROPONIN I SERPL HS-MCNC: 52 NG/L
TSI SER-ACNC: 4.71 MIU/ML (ref 0.55–4.78)
VENTRICULAR RATE: 101 BPM
VENTRICULAR RATE: 91 BPM
WBC # BLD AUTO: 16.8 X10(3) UL (ref 4–11)

## 2024-05-07 PROCEDURE — 99233 SBSQ HOSP IP/OBS HIGH 50: CPT | Performed by: HOSPITALIST

## 2024-05-07 RX ORDER — DEXTROSE MONOHYDRATE 25 G/50ML
INJECTION, SOLUTION INTRAVENOUS ONCE
Status: COMPLETED | OUTPATIENT
Start: 2024-05-07 | End: 2024-05-07

## 2024-05-07 RX ORDER — DEXTROSE MONOHYDRATE 25 G/50ML
25 INJECTION, SOLUTION INTRAVENOUS
Status: ACTIVE | OUTPATIENT
Start: 2024-05-07 | End: 2024-05-07

## 2024-05-07 RX ORDER — FUROSEMIDE 40 MG/1
40 TABLET ORAL ONCE
Status: COMPLETED | OUTPATIENT
Start: 2024-05-07 | End: 2024-05-07

## 2024-05-07 NOTE — PLAN OF CARE
Lina Dudley Patient Status:  Inpatient    1926 MRN [id number]   Location [facility] 3W/SW Attending Nat Reardon MD   Hosp Day # 1 PCP ROBYN KULKARNI MD       Cardiology Nocturnal APN Note    Page Received: Dr. Mercado, ED Physician    HPI:     Patient is a 89 year old female with PMH of HTN, HLD, COPD-on 2 L NC @ baseline, venous insufficiency, chronic troponin elevation, and macular degeneration who presented to the ED with c/o dyspnea. Per EMS, pt's supplemental O2 increased from 2 L to 4L and on arrival, room air sats in the 80s.  Patient denied any chest pain.  Patient was hospitalized at [facility] on 2024 with a fall and COPD exacerbation with hypoxia and CHF.  She was discharged on 2024 to subacute rehab.  [facility] consulted for acute on chronic CHF exacerbation.  2D echocardiogram on 2024 showed normal left ventricular cavity size, wall thickness, and systolic function.  EF 60 to 65%.  Moderate to severe tricuspid regurgitation.  Moderate mitral valve regurgitation.  HPI obtained from chart review and information provided by ED physician.      ED Clinical Course    EKG: Sinus rhythm. No acute ischemic changes.     Labs: K+ 6.1, Na 130, Cr 1.73, Elevated LFTs, troponin 58, BNP 3,178, WBC 16.8    Imaging: Left greater than right.  Questionable pulmonary edema.    Medications: Calcium gluconate, dextrose, Lasix, and insulin      Assessment/Plan:    - Troponin chronically elevated. Lower than hospitalization earlier this month. Elevation likely 2/2 demand. Continue to trend  - Continue diuresis  - Monitor I/O  - Continue to monitor overnight on telemetry  - Formal cardiology consult to follow in AM.       TAISHA Danielle  [facility]  2024  5:00 AM

## 2024-05-07 NOTE — PROGRESS NOTES
Notified of consult by primary RN-Dmitri. Per RN, pt converted to atrial fibrillation-no previous history. EKG reviewed and appears to be sinus rhythm with PACs. No change to plan of care at this time.

## 2024-05-07 NOTE — ED INITIAL ASSESSMENT (HPI)
Pt arrives via EMS from [facility] for c/o SOB that started this morning. Per EMS, pt's o2 was increased earlier today from 2L to 4L, pt satting 84% RA. CXR performed at [facility] earlier today. Pt denies CP at this time. +increased SOB. Aox3, speaking in full sentences, per EMS alert to baseline

## 2024-05-07 NOTE — CM/SW NOTE
Department  notified of request for paul ALAN referrals started. Assigned CM/SW to follow up with pt/family on further discharge planning.     Padmini Gregory, DSC

## 2024-05-07 NOTE — PLAN OF CARE
Problem: Patient Centered Care  Goal: Patient preferences are identified and integrated in the patient's plan of care  Description: Interventions:  - What would you like us to know as we care for you? From home alone / was at [facility] prior to admission  - Provide timely, complete, and accurate information to patient/family  - Incorporate patient and family knowledge, values, beliefs, and cultural backgrounds into the planning and delivery of care  - Encourage patient/family to participate in care and decision-making at the level they choose  - Honor patient and family perspectives and choices  Outcome: Progressing     Problem: Patient/Family Goals  Goal: Patient/Family Long Term Goal  Description: Patient's Long Term Goal: to return home    Interventions:  - vital signs  - follow md orders  - See additional Care Plan goals for specific interventions  Outcome: Progressing  Goal: Patient/Family Short Term Goal  Description: Patient's Short Term Goal: to get better    Interventions:   - follow medication plan  - See additional Care Plan goals for specific interventions  Outcome: Progressing     Problem: CARDIOVASCULAR - ADULT  Goal: Maintains optimal cardiac output and hemodynamic stability  Description: INTERVENTIONS:  - Monitor vital signs, rhythm, and trends  - Monitor for bleeding, hypotension and signs of decreased cardiac output  - Evaluate effectiveness of vasoactive medications to optimize hemodynamic stability  - Monitor arterial and/or venous puncture sites for bleeding and/or hematoma  - Assess quality of pulses, skin color and temperature  - Assess for signs of decreased coronary artery perfusion - ex. Angina  - Evaluate fluid balance, assess for edema, trend weights  Outcome: Progressing  Goal: Absence of cardiac arrhythmias or at baseline  Description: INTERVENTIONS:  - Continuous cardiac monitoring, monitor vital signs, obtain 12 lead EKG if indicated  - Evaluate effectiveness of antiarrhythmic and  heart rate control medications as ordered  - Initiate emergency measures for life threatening arrhythmias  - Monitor electrolytes and administer replacement therapy as ordered  Outcome: Progressing     Problem: METABOLIC/FLUID AND ELECTROLYTES - ADULT  Goal: Electrolytes maintained within normal limits  Description: INTERVENTIONS:  - Monitor labs and rhythm and assess patient for signs and symptoms of electrolyte imbalances  - Administer electrolyte replacement as ordered  - Monitor response to electrolyte replacements, including rhythm and repeat lab results as appropriate  - Fluid restriction as ordered  - Instruct patient on fluid and nutrition restrictions as appropriate  Outcome: Progressing     Problem: SKIN/TISSUE INTEGRITY - ADULT  Goal: Skin integrity remains intact  Description: INTERVENTIONS  - Assess and document risk factors for pressure ulcer development  - Assess and document skin integrity  - Monitor for areas of redness and/or skin breakdown  - Initiate interventions, skin care algorithm/standards of care as needed  Outcome: Progressing  Goal: Incision(s), wounds(s) or drain site(s) healing without S/S of infection  Description: INTERVENTIONS:  - Assess and document risk factors for pressure ulcer development  - Assess and document skin integrity  - Assess and document dressing/incision, wound bed, drain sites and surrounding tissue  - Implement wound care per orders  - Initiate isolation precautions as appropriate  - Initiate Pressure Ulcer prevention bundle as indicated  Outcome: Progressing

## 2024-05-07 NOTE — PLAN OF CARE
Patient is aox4. 4L NC o2. Xray of abdomen.  Problem: Patient Centered Care  Goal: Patient preferences are identified and integrated in the patient's plan of care  Description: Interventions:  - What would you like us to know as we care for you? From home alone / was at [facility] prior to admission  - Provide timely, complete, and accurate information to patient/family  - Incorporate patient and family knowledge, values, beliefs, and cultural backgrounds into the planning and delivery of care  - Encourage patient/family to participate in care and decision-making at the level they choose  - Honor patient and family perspectives and choices  Outcome: Progressing     Problem: Patient/Family Goals  Goal: Patient/Family Long Term Goal  Description: Patient's Long Term Goal: to return home    Interventions:  - vital signs  - follow md orders  - See additional Care Plan goals for specific interventions  Outcome: Progressing  Goal: Patient/Family Short Term Goal  Description: Patient's Short Term Goal: to get better    Interventions:   - follow medication plan  - See additional Care Plan goals for specific interventions  Outcome: Progressing     Problem: CARDIOVASCULAR - ADULT  Goal: Maintains optimal cardiac output and hemodynamic stability  Description: INTERVENTIONS:  - Monitor vital signs, rhythm, and trends  - Monitor for bleeding, hypotension and signs of decreased cardiac output  - Evaluate effectiveness of vasoactive medications to optimize hemodynamic stability  - Monitor arterial and/or venous puncture sites for bleeding and/or hematoma  - Assess quality of pulses, skin color and temperature  - Assess for signs of decreased coronary artery perfusion - ex. Angina  - Evaluate fluid balance, assess for edema, trend weights  Outcome: Progressing  Goal: Absence of cardiac arrhythmias or at baseline  Description: INTERVENTIONS:  - Continuous cardiac monitoring, monitor vital signs, obtain 12 lead EKG if indicated  -  Evaluate effectiveness of antiarrhythmic and heart rate control medications as ordered  - Initiate emergency measures for life threatening arrhythmias  - Monitor electrolytes and administer replacement therapy as ordered  Outcome: Progressing     Problem: METABOLIC/FLUID AND ELECTROLYTES - ADULT  Goal: Electrolytes maintained within normal limits  Description: INTERVENTIONS:  - Monitor labs and rhythm and assess patient for signs and symptoms of electrolyte imbalances  - Administer electrolyte replacement as ordered  - Monitor response to electrolyte replacements, including rhythm and repeat lab results as appropriate  - Fluid restriction as ordered  - Instruct patient on fluid and nutrition restrictions as appropriate  Outcome: Progressing     Problem: SKIN/TISSUE INTEGRITY - ADULT  Goal: Skin integrity remains intact  Description: INTERVENTIONS  - Assess and document risk factors for pressure ulcer development  - Assess and document skin integrity  - Monitor for areas of redness and/or skin breakdown  - Initiate interventions, skin care algorithm/standards of care as needed  Outcome: Progressing  Goal: Incision(s), wounds(s) or drain site(s) healing without S/S of infection  Description: INTERVENTIONS:  - Assess and document risk factors for pressure ulcer development  - Assess and document skin integrity  - Assess and document dressing/incision, wound bed, drain sites and surrounding tissue  - Implement wound care per orders  - Initiate isolation precautions as appropriate  - Initiate Pressure Ulcer prevention bundle as indicated  Outcome: Progressing

## 2024-05-07 NOTE — H&P
[facility]  part of Merged with [facility]    History & Physical    Lina Dudley Patient Status:  Emergency    1926 MRN [id number]   Location [facility] EMERGENCY DEPARTMENT Attending Jazmyne Mercado MD   Hosp Day # 0 PCP ROBYN KULKARNI MD     Date:  2024  Date of Admission:  2024    History provided by:patient  Chief Complaint:     Chief Complaint   Patient presents with    Shortness Of Breath       HPI:   Lina Dudley is a 89 year old female with hx of COPD, pulmonary hypertension, and HFpEF, who p/w dyspnea for 1 day. Patient was just hospitalized -24 for UTI, QUITA and HFpEF. She was seen by Cards and Pulm. She was discharged to [facility]. No diuretic at discharge. Patient developed dyspnea this morning 24. Oxygen was increased from 2L to 4L with SpO2 of 94%. Patient was seen by NP at the time. No other history was available because patient was very lethargic in the ED. When she was sent to ED by EMS, patient has SpO2 99% on 4L. Blood work has na 130, K 6.1, Cr 1.7, WBC 16. CXR has more infiltrates and pleural effusion than CXR 24. Lokelma, dextrose/insulin and calcium given in ED.    History     Past Medical History:    Appendicitis    APPENDECTOMY    Arthritis    Basal cell carcinoma    Of Face     Chicken pox    COPD (chronic obstructive pulmonary disease) (HCC)    Left arm numbness    Measles    Other and unspecified hyperlipidemia    Pneumonia    Tonsillitis    Unspecified essential hypertension     Past Surgical History:   Procedure Laterality Date    Appendectomy      Cataract extraction Bilateral     Dr. Tirado's office    Glaucoma surgery      Hip replacement surgery      Skin surgery  Excision of Basal cell carcinoma of face     Tonsillectomy      T&A     Family History   Problem Relation Age of Onset    Cancer Father     Hypertension Father     Heart Disease Father     Heart Disease Mother     Gastro-Intestinal Disorder Maternal  Grandmother         TUBERCULOSIS    Breast Cancer Maternal Aunt         80s    Neurological Disorder Other         GREAT AUNT, EPILEPSY    Lipids Other         HYPERLIPIDEMIA    Glaucoma Neg     Diabetes Neg      Social History:  Social History     Socioeconomic History    Marital status: Single   Tobacco Use    Smoking status: Former     Current packs/day: 0.00     Average packs/day: 1 pack/day for 30.0 years (30.0 ttl pk-yrs)     Types: Cigarettes     Start date: 1960     Quit date: 1990     Years since quittin.3    Smokeless tobacco: Former   Vaping Use    Vaping status: Never Used   Substance and Sexual Activity    Alcohol use: No     Alcohol/week: 2.0 standard drinks of alcohol     Types: 2 Glasses of wine per week     Comment: rare    Drug use: No   Other Topics Concern    Caffeine Concern Yes     Comment: COFFEE 3 CUPS/DAY     Social Determinants of Health     Food Insecurity: No Food Insecurity (2024)    Food Insecurity     Food Insecurity: Never true   Transportation Needs: No Transportation Needs (2024)    Transportation Needs     Lack of Transportation: No   Housing Stability: Low Risk  (2024)    Housing Stability     Housing Instability: No     Allergies/Medications:   Allergies:   Allergies   Allergen Reactions    Bacitracin UNKNOWN    Neosporin Original [Neomycin-Bacitracin-Polymyxin] ITCHING    Polymyxin B UNKNOWN     (Not in a hospital admission)      Review of Systems:   Negative except depicted in HPI.    A comprehensive 12 point review of systems was completed.  Pertinent positives and negatives noted in the the HPI.    Physical Exam:   Vital Signs:  Blood pressure 96/68, pulse 90, temperature 97.6 °F (36.4 °C), temperature source Temporal, resp. rate 18, SpO2 100%, not currently breastfeeding.     GENERAL:  The patient appeared to be in no distress, but lethargic  SKIN:  Warm and hydrated  HEENT:  Head was atraumatic and normocephalic.  Eyes:  Extraocular muscles were  intact.  Sclera was anicteric.  Pupils were equally reactive to light.  Ears:  There were no lesions.  Nose:  No lesions were noted.   NECK:  Supple.  There was no JVD.   CHEST:  Symmetrical movement on inspiration  HEART:  S1 and S2 heard.  RRR   LUNGS:  Air entry was good.  No crackles or wheezes   ABDOMEN: Soft and non-tender.  Bowel sounds were present.  MUSCULOSKELETAL:  There was no deformity.  There was full range of motion in all the extremities.   EXTREMITIES: There was no edema, clubbing or cyanosis  NEUROLOGICAL:  There was no focal deficit.  Cranial nerves II through XII were intact.  PSYCHIATRIC: Calm and cooperative     Results:     Lab Results   Component Value Date    WBC 15.7 (H) 05/06/2024    HGB 15.1 05/06/2024    HCT 45.0 05/06/2024    .0 05/06/2024    CREATSERUM 1.73 (H) 05/06/2024    BUN 85 (H) 05/06/2024     (L) 05/06/2024    K 6.1 (HH) 05/06/2024    CL 98 05/06/2024    CO2 19.0 (L) 05/06/2024    GLU 89 05/06/2024    CA 8.9 05/06/2024    ALB 3.5 05/06/2024    ALKPHO 323 (H) 05/06/2024    BILT 1.5 (H) 05/06/2024    TP 5.8 05/06/2024     (H) 05/06/2024    ALT 53 (H) 05/06/2024    TSH 3.47 08/28/2018    MG 2.6 05/04/2024     (H) 05/01/2024       No results found.  EKG 12 Lead    Result Date: 5/6/2024  Accelerated Junctional rhythm Abnormal ECG When compared with ECG of 01-MAY-2024 10:45, Junctional rhythm has replaced Sinus rhythm Left anterior fascicular block is no longer Present     Assessment/Plan:   Lina Dudley is a 89 year old female with hx of COPD, pulmonary hypertension, and HFpEF, who p/w dyspnea for 1 day.     # HFpEF exacerbation  - Echo from 5/2 with LVEF 60-65%, no RWMA, moderate MR, Severe TR, PASP 69mmHg   - now patient has higher BNP, and more infiltrates and pleural effusion on CXR  - Cards consult in AM    # Acute kidney injury  - Cr 1.1 on 5/3/24, now 1.7 on arrival, BMP in AM    # Hyperkalemia  - K 6.1 on arrival  - Lokelma, dextrose/insulin  and calcium given in ED, repeat in AM  - check AXR to rule out constipation    # Hyponatremia  - Na 130 on arrival, BMP in AM    # Chronic hypoxemic respiratory failure  # Possible mild early interstitial lung disease   # Pulmonary hypertension  # COPD  - was on intermittent 1L O2 at home, now SpO2 99% on 4L    # Elevated troponin  - troponin 171 on 5/1/24, now down to 58 on arrival    # UTI  - urine culture 5/1/24 grew Klebsiella, sensitive to cefazolin, has been receiving abx since, on cefadroxil at SNF.    # Debility  - patient lived alone with independent ADLs prior to the hospitalization on 5/1/24  - PT/OT    Diet: low salt  PT/OT: consulted  DVT ppx: subcutaneous heparin  Line: none  Code status: DNR  Dispo: greater than 2 midnights    MDM: high Jaime Brunson MD, PhD  Message via Secure Chat  [facility] Hospitalist

## 2024-05-07 NOTE — PHYSICAL THERAPY NOTE
PHYSICAL THERAPY EVALUATION - INPATIENT     Room Number: 346/346-A  Evaluation Date: 5/7/2024  Type of Evaluation: Initial   Physician Order: PT Eval and Treat    Presenting Problem: Acute CHF  Co-Morbidities : lethargic, COPD, HTN, HLD, elevated trop  Reason for Therapy: Mobility Dysfunction and Discharge Planning    PHYSICAL THERAPY ASSESSMENT   Patient is a 89 year old female admitted 5/6/2024 for Acute CHF, weakness , lethargy.  Prior to admission, patient's baseline is Lives alone mod indep with a RW, came [facility].  Patient is currently functioning below baseline with bed mobility, transfers, gait, stair negotiation, maintaining seated position, standing prolonged periods, and performing household tasks.  Patient is requiring maximum assist as a result of the following impairments: decreased functional strength, decreased endurance/aerobic capacity, impaired sitting / standing balance, impaired motor planning, decreased muscular endurance, and medical status.  Physical Therapy will continue to follow for duration of hospitalization.    Patient will benefit from continued skilled PT Services to promote return to prior level of function and safety with continuous assistance and gradual rehabilitative therapy .    PLAN  PT Treatment Plan: Bed mobility;Body mechanics;Endurance;Energy conservation;Patient education;Gait training;Range of motion;Strengthening;Transfer training;Balance training  Rehab Potential : Fair  Frequency (Obs): 3-5x/week    PHYSICAL THERAPY MEDICAL/SOCIAL HISTORY   History related to current admission: 89-year-old  female who lives by herself with underlying COPD and pulmonary artery hypertension, was found by her daughter lying on the floor in her house today. Patient reported that she fell last night. Did not hit her head, but she could not get up because her legs were giving out. She decided to lie on the floor until she was found by her daughter this morning. In the  emergency room, urinalysis showed evidence of urinary tract infection. CBC showed white blood cell count of 12.2 with left shift. Chemistry showed GFR 37, which is below her baseline. CK was 175. B-natriuretic peptide 2779. Patient had a chest x-ray which showed emphysema with chronic prominent pulmonary interstitium, slight progression of diffuse interstitial prominence may reflect progression of pulmonary interstitial fibrosis versus atypical viral pneumonia. CT scan of the cervical spine and CT scan of the brain, besides osteoarthritis, no acute findings. Patient stabilized and sent to [facility] for rehab      Problem List  Principal Problem:    Acute on chronic congestive heart failure, unspecified heart failure type ([facility])  Active Problems:    Acute on chronic congestive heart failure ([facility])    Hyperkalemia    QUITA (acute kidney injury) ([facility])      HOME SITUATION  Home Situation  Type of Home: House  Home Layout: One level;Able to live on main level  Lives With: Alone (family in area [facility])  Patient Owned Equipment: Rolling walker     Prior Level of [place]: I am so exhausted I can barely lift my arms. I think I will need rehab to get stronger.     SUBJECTIVE  I do not feel ready for a chair I think I need to stay in bed.     PHYSICAL THERAPY EXAMINATION   OBJECTIVE  Precautions: Bed/chair alarm  Fall Risk: High fall risk    WEIGHT BEARING RESTRICTION  Weight Bearing Restriction: None                PAIN ASSESSMENT  Rating: Unable to rate          COGNITION  Overall Cognitive Status:  Impaired  Somnolent, lethargic A/O x 2  RANGE OF MOTION AND STRENGTH ASSESSMENT  Upper extremity ROM and strength are within functional limits 2+/5  Lower extremity ROM is within functional limits   Lower extremity strength is within functional limits 2-/5    BALANCE  Static Sitting: Poor  Dynamic Sitting: Poor +  Static Standing: Dependent  Dynamic Standing: Dependent    ACTIVITY TOLERANCE  Pulse: 88  Heart Rate  Source: Monitor  Resp: 16  BP: 91/64  BP Location: Right arm  BP Method: Automatic  Patient Position: Sitting    O2 WALK  Oxygen Therapy  SPO2% on Oxygen at Rest: 95  SPO2% Ambulation on Oxygen: 96    AM-PAC '6-Clicks' INPATIENT SHORT FORM - BASIC MOBILITY  How much difficulty does the patient currently have...  Patient Difficulty: Turning over in bed (including adjusting bedclothes, sheets and blankets)?: A Lot   Patient Difficulty: Sitting down on and standing up from a chair with arms (e.g., wheelchair, bedside commode, etc.): A Lot   Patient Difficulty: Moving from lying on back to sitting on the side of the bed?: A Lot   How much help from another person does the patient currently need...   Help from Another: Moving to and from a bed to a chair (including a wheelchair)?: Total   Help from Another: Need to walk in hospital room?: Total   Help from Another: Climbing 3-5 steps with a railing?: Total     AM-PAC Score:  Raw Score: 9   Approx Degree of Impairment: 81.38%   Standardized Score (AM-PAC Scale): 30.55   CMS Modifier (G-Code): CM    FUNCTIONAL ABILITY STATUS  Functional Mobility/Gait Assessment  Gait Assistance: Not tested (lethargic)  Rolling: maximum assist  Supine to Sit: maximum assist  Sit to Supine: maximum assist  Sit to Stand: maximum assist    Exercise/Education Provided:  Bed mobility  Body mechanics  Energy conservation  Functional activity tolerated  Gait training  Posture  ROM  Strengthening  Lower therapeutic exercise:  Ankle pumps  Heel slides  SLR    The patient's Approx Degree of Impairment: 81.38% has been calculated based on documentation in the [facility] '6 clicks' Inpatient Basic Mobility Short Form.  Research supports that patients with this level of impairment may benefit from IP Rehab PT.  Final disposition will be made by interdisciplinary medical team.    Patient End of Session: Up in chair;With  staff;Needs met;Call light within reach;RN aware of session/findings;All patient  questions and concerns addressed;Alarm set    CURRENT GOALS  Goals to be met by: 5/20/2024  Patient Goal Patient's self-stated goal is: Return home    Goal #1 Patient is able to demonstrate supine - sit EOB @ level: independent     Goal #1   Current Status    Goal #2 Patient is able to demonstrate transfers Sit to/from Stand at assistance level: modified independent with walker - rolling     Goal #2  Current Status    Goal #3 Patient is able to ambulate 50 feet with assist device: walker - rolling at assistance level: modified independent   Goal #3   Current Status    Goal #4 Patient will negotiate 2 stairs/one curb w/ assistive device and supervision   Goal #4   Current Status    Goal #5 Patient to demonstrate independence with home activity/exercise instructions provided to patient in preparation for discharge.   Goal #5   Current Status    Goal #6    Goal #6  Current Status      Patient Evaluation Complexity Level:  History Low - no personal factors and/or co-morbidities   Examination of body systems Low -  addressing 1-2 elements   Clinical Presentation Low- Stable   Clinical Decision Making  Low Complexity     Therapeutic Exercise: 10 minutes

## 2024-05-07 NOTE — ED PROVIDER NOTES
Patient Seen in: [facility] Emergency Department    History     Chief Complaint   Patient presents with    Shortness Of Breath       HPI    History is provided by patient/independent historian: Patient, EMS  89 year old female with history of hypertension, hyperlipidemia, brought in by EMS for shortness of breath that started this morning.  Patient reports she has been short of breath for years but is unsure of when it got worse.  EMS reports patient had her oxygen increased at rehab from 2 to 4 L, and patient was satting 84% on room air.  She does have increased shortness of breath but no chest pain.  No leg swelling.    History reviewed.   Past Medical History:    Appendicitis    APPENDECTOMY    Arthritis    Basal cell carcinoma    Of Face     Chicken pox    COPD (chronic obstructive pulmonary disease) (HCC)    Left arm numbness    Measles    Other and unspecified hyperlipidemia    Pneumonia    Tonsillitis    Unspecified essential hypertension         History reviewed.   Past Surgical History:   Procedure Laterality Date    Appendectomy      Cataract extraction Bilateral     Dr. Tirado's office    Glaucoma surgery      Hip replacement surgery      Skin surgery  Excision of Basal cell carcinoma of face     Tonsillectomy      T&A         Home Medications reviewed :  (Not in a hospital admission)        History reviewed.   Social History     Socioeconomic History    Marital status: Single   Tobacco Use    Smoking status: Former     Current packs/day: 0.00     Average packs/day: 1 pack/day for 30.0 years (30.0 ttl pk-yrs)     Types: Cigarettes     Start date: 1960     Quit date: 1990     Years since quittin.3    Smokeless tobacco: Former   Vaping Use    Vaping status: Never Used   Substance and Sexual Activity    Alcohol use: No     Alcohol/week: 2.0 standard drinks of alcohol     Types: 2 Glasses of wine per week     Comment: rare    Drug use: No   Other Topics Concern    Caffeine Concern Yes      Comment: COFFEE 3 CUPS/DAY         ROS  Review of Systems   Respiratory:  Positive for shortness of breath.    Cardiovascular:  Negative for chest pain.   All other systems reviewed and are negative.     All other pertinent organ systems are reviewed and are negative.      Physical Exam     ED Triage Vitals   BP 05/06/24 1940 96/68   Pulse 05/06/24 1940 99   Resp 05/06/24 1940 22   Temp 05/06/24 1947 97.6 °F (36.4 °C)   Temp src 05/06/24 1947 Temporal   SpO2 05/06/24 1944 99 %   O2 Device 05/06/24 1941 None (Room air)     Vital signs reviewed.      Physical Exam  Vitals and nursing note reviewed.   Cardiovascular:      Pulses: Normal pulses.   Pulmonary:      Effort: Tachypnea present. No respiratory distress.      Breath sounds: Decreased breath sounds present.   Abdominal:      General: There is no distension.   Neurological:      Mental Status: She is alert.         ED Course       Labs:     Labs Reviewed   COMP METABOLIC PANEL (14) - Abnormal; Notable for the following components:       Result Value    Sodium 130 (*)     Potassium 6.1 (*)     CO2 19.0 (*)     BUN 85 (*)     Creatinine 1.73 (*)     BUN/CREA Ratio 49.1 (*)     eGFR-Cr 27 (*)     ALT 53 (*)      (*)     Alkaline Phosphatase 323 (*)     Bilirubin, Total 1.5 (*)     All other components within normal limits   PRO BETA NATRIURETIC PEPTIDE - Abnormal; Notable for the following components:    Beta Natriuretic Peptide 3,178 (*)     All other components within normal limits   TROPONIN I HIGH SENSITIVITY - Abnormal; Notable for the following components:    Troponin I (High Sensitivity) 58 (*)     All other components within normal limits   CBC W/ DIFFERENTIAL - Abnormal; Notable for the following components:    WBC 15.7 (*)     RDW-SD 49.9 (*)     RDW 16.8 (*)     Neutrophil Absolute Prelim 13.16 (*)     Neutrophil Absolute 13.16 (*)     Monocyte Absolute 1.39 (*)     All other components within normal limits   POCT GLUCOSE - Normal   POCT GLUCOSE  - Normal   SARS-COV-2/FLU A AND B/RSV BY PCR (GENEXPERT) - Normal    Narrative:     This test is intended for the qualitative detection and differentiation of SARS-CoV-2, influenza A, influenza B, and respiratory syncytial virus (RSV) viral RNA in nasopharyngeal or nares swabs from individuals suspected of respiratory viral infection consistent with COVID-19 by their healthcare provider. Signs and symptoms of respiratory viral infection due to SARS-CoV-2, influenza, and RSV can be similar.                                    Test performed using the Xpert Xpress SARS-CoV-2/FLU/RSV (real time RT-PCR)  assay on the GeneXpert instrument, [employer], [facility] [place] [address].                   This test is being used under the Food and Drug Administration's Emergency Use Authorization.                                    The authorized Fact Sheet for Healthcare Providers for this assay is available upon request from the laboratory.   CBC WITH DIFFERENTIAL WITH PLATELET    Narrative:     The following orders were created for panel order CBC With Differential With Platelet.                  Procedure                               Abnormality         Status                                     ---------                               -----------         ------                                     CBC W/ DIFFERENTIAL[[id number]]          Abnormal            Final result                                                 Please view results for these tests on the individual orders.   RAINBOW DRAW LAVENDER   RAINBOW DRAW LIGHT GREEN   RAINBOW DRAW BLUE   RAINBOW DRAW GOLD         My EKG Interpretation: EKG    Rate, intervals and axes as noted on EKG Report.  Rate: 91  Rhythm: Sinus Rhythm  Reading: normal for rate, normal for rhythm, no acute ST changes           As reviewed and Interpreted by me      Imaging Results Available and Reviewed while in ED:   XR CHEST AP PORTABLE  (CPT=71045)    Result Date: 5/6/2024  CONCLUSION:   Bilateral small  pleural effusions, left greater than right and associated atelectasis.  Questionable pulmonary edema.  Background scarring/fibrosis again noted.    elm-remote    Dictated by (CST): Thaddeus Medley MD on 5/06/2024 at 9:15 PM     Finalized by (CST): Thaddeus Medley MD on 5/06/2024 at 9:16 PM         My review and independent interpretation of XR images: b/l pleural effusion. Radiology report corroborates this in addition to other details as reported by them.      Decision rules/scores evaluated: none      Diagnostic labs/tests considered but not ordered: ddimer    ED Medications Administered:   Medications   calcium gluconate 1g in 100mL iso-NaCl IVPB premix (1 g Intravenous New Bag 5/6/24 2053)   sodium zirconium cyclosilicate (Lokelma) oral packet 10 g (has no administration in time range)   furosemide (Lasix) 10 mg/mL injection 20 mg (has no administration in time range)   dextrose 50% injection 50 mL (50 mL Intravenous Given 5/6/24 2054)   insulin regular human (Novolin R, Humulin R) 100 UNIT/ML injection 10 Units (10 Units Intravenous Given 5/6/24 2037)   sodium bicarbonate injection 50 mEq (50 mEq Intravenous Given 5/6/24 2057)   acetaminophen (Tylenol Extra Strength) tab 1,000 mg (1,000 mg Oral Given 5/6/24 2049)                MDM       Medical Decision Making      Differential Diagnosis: After obtaining the patient's history, performing the physical exam and reviewing the diagnostics, multiple initial diagnoses were considered based on the presenting problem including ACS, pleurisy, pneumonia, viral syndrome    External document review: I personally reviewed available external medical records for any recent pertinent discharge summaries, testing, and procedures - the findings are as follows: 5/1/24 admission for heart failure    Complicating Factors: The patient already  has a past medical history of Appendicitis (1940), Arthritis, Basal cell carcinoma, Chicken pox, COPD (chronic obstructive pulmonary disease)  (HCC), Left arm numbness (1974), Measles, Other and unspecified hyperlipidemia, Pneumonia, Tonsillitis, and Unspecified essential hypertension. to contribute to the complexity of this ED evaluation.    Procedures performed: none    Discussed management with physician/appropriate source: Ange Sheriff for [facility]    Considered admission/deescalation of care for: hypoxia    Social determinants of health affecting patient care: none    Prescription medications considered: discussed continuing current medication regimen    The patient requires continuous monitoring for: hypoxia    Shared decision making: discussed possible admission    Critical Care Time:  Total critical care time for this patient was 46 minutes exclusive of separately billable procedures, but in obtaining history, performing a physical exam, bedside monitoring of interventions, collecting and interpreting test, and discussion with consultants.        Disposition and Plan     Clinical Impression:  1. Acute on chronic congestive heart failure, unspecified heart failure type (HCC)    2. Hyperkalemia    3. QUITA (acute kidney injury) ([facility])        Disposition:  Admit    Follow-up:  No follow-up provider specified.    Medications Prescribed:  Current Discharge Medication List          Hospital Problems       Present on Admission  Date Reviewed: 6/29/2023            ICD-10-CM Noted POA    Hyperkalemia E87.5 5/6/2024 Unknown

## 2024-05-07 NOTE — PROGRESS NOTES
Progress Note     Lina Dudley Patient Status:  Inpatient    1926 MRN [id number]   Location [facility] 3W/SW Attending Demi Arthur MD   Hosp Day # 1 PCP ROBYN KULKARNI MD     Chief Complaint: here with sob      Subjective:   S: Patient here with sob  Does not feel well  Now with increasing difficulty breathing   Denies cp    Review of Systems:   10 point ROS completed and was negative, except for pertinent positive and negatives stated in subjective.    Objective:   Vital signs:  Temp:  [97.4 °F (36.3 °C)-97.8 °F (36.6 °C)] 97.4 °F (36.3 °C)  Pulse:  [] 82  Resp:  [12-25] 18  BP: ()/(64-90) 91/64  SpO2:  [93 %-100 %] 97 %    Wt Readings from Last 6 Encounters:   24 123 lb 6.4 oz (56 kg)   24 113 lb 8 oz (51.5 kg)   23 105 lb (47.6 kg)   23 116 lb (52.6 kg)   23 124 lb 9.6 oz (56.5 kg)   23 124 lb 11.2 oz (56.6 kg)         Physical Exam:    General: No acute distress. Alert ,         Respiratory: + crackles b/l.  Cardiovascular: S1, S2. Regular rate and rhythm. No murmurs, rubs or gallops.   Abdomen: Soft, nontender, nondistended.  Positive bowel sounds. No rebound or guarding.  Neurologic: No focal neurological deficits.   Musculoskeletal: Moves all extremities.  Extremities: No edema.    Results:   Diagnostic Data:      Labs:    Labs Last 24 Hours:   BMP     CBC    Other     Na 130 Cl 100 BUN 59 Glu 85   Hb 14.6   PTT - Procal -   K 4.8 CO2 17.0 Cr 1.69   WBC 16.8 >< .0  INR - CRP -   Renal Lytes Endo    Hct 43.7   Trop - D dim -   eGFR - Ca 8.5 POC Gluc  136    LFT   pBNP - Lactic -   eGFR AA - PO4 - A1c -    APk 323 Prot 5.8  LDL -     Mg 2.8 TSH 4.707   ALT 53 T briseida 1.5 Alb 3.5        COVID-19 Lab Results    COVID-19  Lab Results   Component Value Date    COVID19 Not Detected 2024    COVID19 Not Detected 2024    COVID19 Not Detected 2023       Pro-Calcitonin  No results for input(s): \"PCT\" in the last 168  hours.    Cardiac  No results for input(s): \"TROP\", \"PBNP\" in the last 168 hours.    Creatinine Kinase  Recent Labs   Lab 05/01/24  1014 05/07/24  0623   * 65       Inflammatory Markers  No results for input(s): \"CRP\", \"ANASTASIIA\", \"LDH\", \"DDIMER\" in the last 168 hours.    Imaging: Imaging data reviewed in Epic.    Medications:    sodium zirconium cyclosilicate  10 g Oral Q8H    heparin  5,000 Units Subcutaneous 2 times per day       Assessment & Plan:   ASSESSMENT / PLAN:     Lina Dudley is a 89 year old female with hx of COPD, pulmonary hypertension, and HFpEF, who p/w dyspnea for 1 day.      # HFpEF exacerbation  - Echo from 5/2 with LVEF 60-65%, no RWMA, moderate MR, Severe TR, PASP 69mmHg   - now patient has higher BNP, and more infiltrates and pleural effusion on CXR  - Cards consult appreciated     # Acute kidney injury  - Cr 1.1 on 5/3/24, now 1.7 on arrival, BMP in AM     # Hyperkalemia  - K 6.1 on arrival  - Lokelma, dextrose/insulin and calcium given in ED, repeat in AM  -repeat K improved  -plan low potassium diet    # Hyponatremia  - Na 130 on arrival, BMP in AM     # Chronic hypoxemic respiratory failure  # Possible mild early interstitial lung disease   # Pulmonary hypertension  # COPD  # Pneumonia  - was on intermittent 1L O2 at home, now SpO2 99% on 4L  -? Empiric abx. for underlying pneumonia  Pulmonology eval     # Elevated troponin  - troponin 171 on 5/1/24, now down to 58 on arrival     # UTI  - urine culture 5/1/24 grew Klebsiella, sensitive to cefazolin, has been receiving abx since, on cefadroxil at SNF.  -broaden abx for pneumonia will also tx. klebsiella     # Debility  - patient lived alone with independent ADLs prior to the hospitalization on 5/1/24  - PT/OT     Diet: low salt  PT/OT: consulted  DVT ppx: subcutaneous heparin  Line: none  Code status: DNR  Dispo: greater than 2 midnights       Will the patient be referred to TCC on discharge?: yes  Estimated date of discharge:  TBD  Discharge is dependent on: clinical improvement  At this point Ms. Dudley is expected to be discharge to: snf    Plan of care discussed with patient/nursing/family    Outpatient records or previous hospital records reviewed.   Patient and/or patient's family given opportunity to ask questions and note understanding and agreeing with therapeutic plan as outlined     Coordinated care with providers and counseling re: treatment plan and workup    MDM: High complexity     BAUTISTA LUND MD    Supplementary Documentation:         **Certification      PHYSICIAN Certification of Need for Inpatient Hospitalization - Initial Certification    Patient will require inpatient services that will reasonably be expected to span two midnight's based on the clinical documentation in H+P.   Based on patients current state of illness, I anticipate that, after discharge, patient will require TBD.

## 2024-05-07 NOTE — ED QUICK NOTES
Orders for admission, patient is aware of plan and ready to go upstairs. Any questions, please call ED RN Malorie at extension [phone].     Patient Covid vaccination status: Fully vaccinated     COVID Test Ordered in ED: SARS-CoV-2/Flu A and B/RSV by PCR (GeneXpert)    COVID Suspicion at Admission: N/A    Running Infusions:      Mental Status/LOC at time of transport: Aox3    Other pertinent information:   CIWA score: N/A   NIH score:  N/A

## 2024-05-07 NOTE — OCCUPATIONAL THERAPY NOTE
OCCUPATIONAL THERAPY EVALUATION - INPATIENT     Room Number: 346/346-A  Evaluation Date: 5/7/2024  Type of Evaluation: Initial  Presenting Problem: hyperkalemia, CHF    Physician Order: IP Consult to Occupational Therapy  Reason for Therapy: ADL/IADL Dysfunction and Discharge Planning    OCCUPATIONAL THERAPY ASSESSMENT   Patient is a 89 year old female admitted 5/6/2024 for hyperkalemia, CHF.  Prior to admission, patient's baseline is mod I for BADLs and household mobility.  Patient is currently functioning below baseline with BADLs and functional mobility.  Patient is requiring maximum assist as a result of the following impairments: decreased functional strength, decreased endurance, pain, decreased muscular endurance, medical status, and increased O2 needs from baseline. Occupational Therapy will continue to follow for duration of hospitalization.    Patient will benefit from continued skilled OT Services to promote return to prior level of function and safety with continuous assistance and gradual rehabilitative therapy     PLAN  OT Treatment Plan: Balance activities;ADL training;Functional transfer training;Endurance training;Patient/Family education;Patient/Family training     OCCUPATIONAL THERAPY MEDICAL/SOCIAL HISTORY   Problem List  Principal Problem:    Acute on chronic congestive heart failure, unspecified heart failure type (HCC)  Active Problems:    Acute on chronic congestive heart failure (HCC)    Hyperkalemia    QUITA (acute kidney injury) ([facility])    HOME SITUATION  Type of Home: House  Home Layout: One level  Lives With: Alone (gordy is her primary CG and assists as needed with IADLs)  Drives: No  Patient Regularly Uses: Home O2 (typically PRN but for 2 weeks leading up to recent admission, in place 24/7 per neice)      Prior Level of [place]: Pt lives alone and prior to recent admissions (5/1-5/4/24) pt was managing BADLs independently and household mobility using R/W. Pt's niece is her primary CG  and assists PRN with IADLs.     SUBJECTIVE  \"I'm exhausted\"    OCCUPATIONAL THERAPY EXAMINATION    OBJECTIVE  Precautions: Bed/chair alarm  Fall Risk: High fall risk    PAIN ASSESSMENT  Rating: Unable to rate  Location: generalized  Management Techniques: Relaxation; Repositioning    ACTIVITY TOLERANCE  Poor- appears distressed with conversation Pt tolerates minimal bd level activity with indication of fatigue, generalized \"soreness\", labored breathing    O2 SATURATIONS  Oxygen Therapy  SPO2% on Oxygen at Rest: 93  At rest oxygen flow (liters per minute): 3  SPO2% Ambulation on Oxygen: 89  Ambulation oxygen flow (liters per minute): 3    COGNITION  Awake  Pt is able to follow commands to participate in bed level activity/self care    Communication: able to communicate her needs    Behavioral/Emotional/Social: appropriate, mostly flat with low energy    RANGE OF MOTION   Upper extremity ROM is within functional limits       ACTIVITIES OF DAILY LIVING ASSESSMENT  AM-PAC ‘6-Clicks’ Inpatient Daily Activity Short Form  How much help from another person does the patient currently need…  -   Putting on and taking off regular lower body clothing?: A Lot  -   Bathing (including washing, rinsing, drying)?: A Lot  -   Toileting, which includes using toilet, bedpan or urinal? : Total  -   Putting on and taking off regular upper body clothing?: A Lot  -   Taking care of personal grooming such as brushing teeth?: A Lot  -   Eating meals?: A Little    AM-PAC Score:  Score: 12  Approx Degree of Impairment: 66.57%  Standardized Score (AM-PAC Scale): 30.6  CMS Modifier (G-Code): CL    FUNCTIONAL TRANSFER ASSESSMENT  Sit to Stand: -- (Not appropraite this session)    BED MOBILITY  Rolling: Maximum Assist  Supine to Sit : -- (pt declined)  Sit to Supine (OT): Not Tested      FUNCTIONAL ADL ASSESSMENT  Grooming: Min A for OFH from bed level limited by generalized weakness  UE self care: Max A  LE self care: Max A  Toileting:  dependent    Skilled Therapy Provided: Pt received sitting up in bed with supportive niece present. Pt is pleasant, displaying low energy. Pt declines to OOB as well as declines EOB but agreeable to bed level activity. Pt assisted to change brief and external catheter providing Max A for BENNETT rolling multiple times and dependent assistance to reposition to HOB/side lying. At completion of session pt positioned in side lying with HOB elevated at pt's request reporting improved ease of breathing and back pain. Call light in reach and bed alarm on. Niece at bed side indicates pln is to return to [facility].      EDUCATION PROVIDED  Patient: Role of Occupational Therapy; Discharge Recommendations; Posture/Positioning (pressure relief, relaxaton and breathing techniques)  Patient's Response to Education: Requires Further Education  Family/Caregiver's Response to Education: Verbalized Understanding    The patient's Approx Degree of Impairment: 66.57% has been calculated based on documentation in the [facility] '6 clicks' Inpatient Daily Activity Short Form.  Research supports that patients with this level of impairment may benefit from [facility].  Final disposition will be made by interdisciplinary medical team.     Patient End of Session: In bed;Needs met;Call light within reach;RN aware of session/findings;All patient questions and concerns addressed;Family present;Alarm set    OT Goals  Patients self stated goal is: pt did not state, niece indicates goal is to return to rehab    Pt will perform bed mobility with Min A for pressure relief and supine self care  Comment:    Pt will tolerate sitting EOB x5 minutes with CGA for participation in self care  Comment:    Pt will perform grooming/UB self care with set up 1x from supported sitting  Comment:     Comment:          Goals  on: 24  Frequency: 3x/week    Patient Evaluation Complexity Level:   Occupational Profile/Medical History MODERATE - Expanded review of history including  review of medical or therapy record   Specific performance deficits impacting engagement in ADL/IADL MODERATE  3 - 5 performance deficits   Client Assessment/Performance Deficits MODERATE - Comorbidities and min to mod modifications of tasks    Clinical Decision Making MODERATE - Analysis of occupational profile, detailed assessments, several treatment options    Overall Complexity MODERATE     Self-Care Home Management: 25 minutes

## 2024-05-07 NOTE — CONSULTS
Cardiology Consult Note    Lina Dudley Patient Status:  Inpatient    1926 MRN [id number]   Location [facility] 3W/SW Attending Demi Arthur MD   Hosp Day # 1 PCP ROBYN KULKARNI MD     HPI.  Lina Dudley is a 89 year old female with a history of chronic hypoxic respiratory failure/ILD, heart failure, CKD, COPD, pulmonary arterial hypertension who presents to the emergency room from her nursing home with complaints of worsening dyspnea.  Patient currently on 2 L nasal cannula and increased her requirement to 4 L and still has saturations of 84%.  Lab work pertinent for potassium 6.1, creatinine 1.73, sodium one 1.3, BNP 3100, WBC 15.7 with 13% neutrophils.  Troponin initially 58, recheck 52-has history of chronic troponin elevation and earlier this month she was in the 160s to 170s.  Chest x-ray showed bilateral small pleural effusions and possible pulm edema as well as chronic back chronic scarring, fibrosis.  In the ED patient was treated for hypokalemia with Lasix, calcium, dextrose and insulin.    Patient was recent admitted from - after presenting with a fall and had management of acute on chronic respiratory failure, heart failure.  She was discharged to SNF.    Echocardiogram 2024  1. Left ventricle: The cavity size was normal. Wall thickness was normal.      Systolic function was normal. The estimated ejection fraction was 60-65%,      by visual assessment. No diagnostic evidence for regional wall motion      abnormalities. Left ventricular diastolic function parameters were normal      for the patient's age.   2. Right ventricle: The cavity size was increased.   3. Left atrium: The left atrial volume was normal.   4. Right atrium: The atrium was dilated.   5. Aortic valve: The valve was trileaflet. The leaflets were mildly      thickened and mildly calcified.   6. Mitral valve: The leaflets were mildly calcified. Prolapse. There was      moderate regurgitation.   7.  Tricuspid valve: There was moderate-severe regurgitation.   8. Pulmonary arteries: Systolic pressure was moderately to markedly      increased, estimated to be 69mm Hg.       --------------------------------------------------------------------------------------------------------------------------------  ROS 10 systems reviewed, pertinent findings above.  ROS    History:  Past Medical History:    Appendicitis    APPENDECTOMY    Arthritis    Basal cell carcinoma    Of Face     Chicken pox    COPD (chronic obstructive pulmonary disease) (HCC)    Left arm numbness    Measles    Other and unspecified hyperlipidemia    Pneumonia    Tonsillitis    Unspecified essential hypertension     Past Surgical History:   Procedure Laterality Date    Appendectomy      Cataract extraction Bilateral     Dr. Tirado's office    Glaucoma surgery      Hip replacement surgery      Skin surgery  Excision of Basal cell carcinoma of face     Tonsillectomy      T&A     Family History   Problem Relation Age of Onset    Cancer Father     Hypertension Father     Heart Disease Father     Heart Disease Mother     Gastro-Intestinal Disorder Maternal Grandmother         TUBERCULOSIS    Breast Cancer Maternal Aunt         80s    Neurological Disorder Other         GREAT AUNT, EPILEPSY    Lipids Other         HYPERLIPIDEMIA    Glaucoma Neg     Diabetes Neg       reports that she quit smoking about 34 years ago. Her smoking use included cigarettes. She started smoking about 64 years ago. She has a 30 pack-year smoking history. She has quit using smokeless tobacco. She reports that she does not drink alcohol and does not use drugs.    Objective:   Temp: 97.4 °F (36.3 °C)  Pulse: 82  Resp: 18  BP: 91/64    Intake/Output:     Intake/Output Summary (Last 24 hours) at 5/7/2024 1224  Last data filed at 5/7/2024 0900  Gross per 24 hour   Intake 300 ml   Output --   Net 300 ml       Physical Exam:     General: awake, alert  HEENT: Normocephalic, anicteric sclera,  neck supple.  Neck: No JVD, carotids 2+, no bruits.  Cardiac: Regular rate and rhythm. S1, S2 normal. No murmur, pericardial rub, S3.  Lungs: Clear without wheezes, rales, rhonchi or dullness.  Normal excursions and effort.  Abdomen: Soft, non-tender. BS-present.  Extremities: Without clubbing, cyanosis or edema.  Peripheral pulses are 2+.  Neurologic: Non-focal  Skin: Warm and dry.       Assessment:    Shortness of breath  Acute on chronic hypoxic respiratory failure  Atrial fibrillation, new onset  Interstitial lung disease  COPD  Pulm hypertension  Volume overload      Plan:  - 89 yo presenting with SOB, hypoxia and admitted with respiratory failure.   - Pt with various rhythm abnormalities in the past including accelerated junctional - has new afib since this admission, likely provoked from respiratory failure.   - HR better today, BP soft - can trial lopressor 12.5mg BID   - consider low dose eliquis 2.5mg BID if persistent afib  - no overt volume overload on exam however CXR concerning for ILD and some pulmonary edema trial of oral lasix today    Thank you for allowing me to take part in the care of Lina Dudley. Please call with any questions of concerns.      Level of care: C5    Dr. Charles Wagoner,   May 07, 2024  12:24 PM

## 2024-05-07 NOTE — ED QUICK NOTES
Rounding Completed    Plan of Care reviewed. Waiting for admission.  Elimination needs assessed.  Patient resting in bed, speaking in full sentences. Pt on cardiac monitor for frequent VS assessments, NSR. No new requests at this time.     Bed is locked and in lowest position. Call light within reach.

## 2024-05-07 NOTE — CM/SW NOTE
MDo for dc planning    Per chart review pt lives  home alone at baseline  Admitted from E [facility] for SOB, inc 02 needs  2-4L    Anticipated therapy need: Gradual Rehabilitative Therapy    Return ref sent, CLRC sent, PASRR current    Plan  Return to E    / to remain available for support and/or discharge planning.     Cary Leslie, RN    Ext [phone]

## 2024-05-08 ENCOUNTER — APPOINTMENT (OUTPATIENT)
Dept: GENERAL RADIOLOGY | Facility: HOSPITAL | Age: 89
DRG: 291 | End: 2024-05-08
Attending: HOSPITALIST
Payer: MEDICARE

## 2024-05-08 LAB
ANION GAP SERPL CALC-SCNC: 9 MMOL/L (ref 0–18)
BASOPHILS # BLD AUTO: 0.01 X10(3) UL (ref 0–0.2)
BASOPHILS NFR BLD AUTO: 0.1 %
BUN BLD-MCNC: 74 MG/DL (ref 9–23)
BUN/CREAT SERPL: 49.3 (ref 10–20)
CALCIUM BLD-MCNC: 8.1 MG/DL (ref 8.7–10.4)
CHLORIDE SERPL-SCNC: 97 MMOL/L (ref 98–112)
CO2 SERPL-SCNC: 24 MMOL/L (ref 21–32)
CREAT BLD-MCNC: 1.5 MG/DL
DEPRECATED RDW RBC AUTO: 51.6 FL (ref 35.1–46.3)
EGFRCR SERPLBLD CKD-EPI 2021: 31 ML/MIN/1.73M2 (ref 60–?)
EOSINOPHIL # BLD AUTO: 0.08 X10(3) UL (ref 0–0.7)
EOSINOPHIL NFR BLD AUTO: 0.7 %
ERYTHROCYTE [DISTWIDTH] IN BLOOD BY AUTOMATED COUNT: 16.4 % (ref 11–15)
GLUCOSE BLD-MCNC: 94 MG/DL (ref 70–99)
HCT VFR BLD AUTO: 45.4 %
HGB BLD-MCNC: 15 G/DL
IMM GRANULOCYTES # BLD AUTO: 0.07 X10(3) UL (ref 0–1)
IMM GRANULOCYTES NFR BLD: 0.6 %
LYMPHOCYTES # BLD AUTO: 0.84 X10(3) UL (ref 1–4)
LYMPHOCYTES NFR BLD AUTO: 7 %
MCH RBC QN AUTO: 32.2 PG (ref 26–34)
MCHC RBC AUTO-ENTMCNC: 33 G/DL (ref 31–37)
MCV RBC AUTO: 97.4 FL
MONOCYTES # BLD AUTO: 0.82 X10(3) UL (ref 0.1–1)
MONOCYTES NFR BLD AUTO: 6.8 %
NEUTROPHILS # BLD AUTO: 10.24 X10 (3) UL (ref 1.5–7.7)
NEUTROPHILS # BLD AUTO: 10.24 X10(3) UL (ref 1.5–7.7)
NEUTROPHILS NFR BLD AUTO: 84.8 %
OSMOLALITY SERPL CALC.SUM OF ELEC: 292 MOSM/KG (ref 275–295)
PLATELET # BLD AUTO: 179 10(3)UL (ref 150–450)
POTASSIUM SERPL-SCNC: 4.3 MMOL/L (ref 3.5–5.1)
PROCALCITONIN SERPL-MCNC: 0.66 NG/ML (ref ?–0.05)
Q-T INTERVAL: 346 MS
QRS DURATION: 78 MS
QTC CALCULATION (BEZET): 420 MS
R AXIS: -18 DEGREES
RBC # BLD AUTO: 4.66 X10(6)UL
SODIUM SERPL-SCNC: 130 MMOL/L (ref 136–145)
T AXIS: 63 DEGREES
VENTRICULAR RATE: 89 BPM
WBC # BLD AUTO: 12.1 X10(3) UL (ref 4–11)

## 2024-05-08 PROCEDURE — 99233 SBSQ HOSP IP/OBS HIGH 50: CPT | Performed by: HOSPITALIST

## 2024-05-08 PROCEDURE — 71045 X-RAY EXAM CHEST 1 VIEW: CPT | Performed by: HOSPITALIST

## 2024-05-08 NOTE — PROGRESS NOTES
Progress Note     Lina Dudley Patient Status:  Inpatient    1926 MRN [id number]   Location [facility] 3W/SW Attending Demi Arthur MD   Hosp Day # 2 PCP ROBYN KULKARNI MD     Chief Complaint: here with sob      Subjective:   S: Still feeling weak and tired.  No worsening sob.  No chest pain.  No fevers/chills.     Review of Systems:   10 point ROS completed and was negative, except for pertinent positive and negatives stated in subjective.    Objective:   Vital signs:  Temp:  [97.3 °F (36.3 °C)-97.6 °F (36.4 °C)] 97.6 °F (36.4 °C)  Pulse:  [83-87] 87  Resp:  [16-20] 18  BP: (91-98)/(61-69) 91/68  SpO2:  [92 %-97 %] 97 %    Wt Readings from Last 6 Encounters:   24 120 lb 14.4 oz (54.8 kg)   24 113 lb 8 oz (51.5 kg)   23 105 lb (47.6 kg)   23 116 lb (52.6 kg)   23 124 lb 9.6 oz (56.5 kg)   23 124 lb 11.2 oz (56.6 kg)         Physical Exam:    Gen: No acute distress  Pulm: no wheezing, some crackles heard  CV: Heart with regular rate and rhythm  Abd: Abdomen soft, nontender, nondistended, bowel sounds present  Neuro: No acute focal deficits  MSK: Full range of motion in extremities  Skin: Warm and dry  Psych: Normal affect  Ext: no c/c/e    Results:   Diagnostic Data:      Labs:    Labs Last 24 Hours:   BMP     CBC    Other     Na 130 Cl 97 BUN 74 Glu 94   Hb 15.0   PTT - Procal -   K 4.3 CO2 24.0 Cr 1.50   WBC 12.1 >< .0  INR - CRP -   Renal Lytes Endo    Hct 45.4   Trop - D dim -   eGFR - Ca 8.1 POC Gluc  -    LFT   pBNP - Lactic -   eGFR AA - PO4 - A1c -   AST - APk - Prot -  LDL -     Mg - TSH -   ALT - T briseida - Alb -        COVID-19 Lab Results    COVID-19  Lab Results   Component Value Date    COVID19 Not Detected 2024    COVID19 Not Detected 2024    COVID19 Not Detected 2023       Pro-Calcitonin  No results for input(s): \"PCT\" in the last 168 hours.    Cardiac  No results for input(s): \"TROP\", \"PBNP\" in the last 168  hours.    Creatinine Kinase  Recent Labs   Lab 05/07/24  0623   CK 65       Inflammatory Markers  No results for input(s): \"CRP\", \"ANASTASIIA\", \"LDH\", \"DDIMER\" in the last 168 hours.    Imaging: Imaging data reviewed in Epic.    Medications:    metoprolol tartrate  12.5 mg Oral 2x Daily(Beta Blocker)    heparin  5,000 Units Subcutaneous 2 times per day       Assessment & Plan:   ASSESSMENT / PLAN:     Lina Dudley is a 89 year old female with hx of COPD, pulmonary hypertension, and HFpEF, who p/w dyspnea for 1 day.      # HFpEF exacerbation  - Echo from 5/2 with LVEF 60-65%, no RWMA, moderate MR, Severe TR, PASP 69mmHg   - now patient has higher BNP, and more infiltrates and pleural effusion on CXR  - Cards consult appreciated     # Acute kidney injury  - Cr 1.1 on 5/3/24, now 1.7 on arrival, BMP in AM shows Cr of 1.5     # Hyperkalemia  - K 6.1 on arrival  - Lokelma, dextrose/insulin and calcium given in ED, repeat in AM  -repeat K improved  -plan low potassium diet    # Hyponatremia  - Na 130 on arrival, mild, will monitor     # Chronic hypoxemic respiratory failure  # Possible mild early interstitial lung disease   # Pulmonary hypertension  # COPD  # Pneumonia  - was on intermittent 1L O2 at home, now SpO2 99% on 4L  -? Empiric abx. for underlying pneumonia  -check procal - hold abx for now     # Elevated troponin  - troponin 171 on 5/1/24, now down to 58 on arrival     # UTI  - urine culture 5/1/24 grew Klebsiella, sensitive to cefazolin, has been receiving abx since, on cefadroxil at SNF.  - treated     # Debility  - patient lived alone with independent ADLs prior to the hospitalization on 5/1/24  - PT/OT     Diet: low salt  PT/OT: consulted  DVT ppx: subcutaneous heparin  Line: none  Code status: DNR  Dispo: greater than 2 midnights       Will the patient be referred to TCC on discharge?: yes  Estimated date of discharge: TBD  Discharge is dependent on: clinical improvement  At this point Ms. Dudley is  expected to be discharge to: snf    Plan of care discussed with patient/nursing/family    Outpatient records or previous hospital records reviewed.   Patient and/or patient's family given opportunity to ask questions and note understanding and agreeing with therapeutic plan as outlined     Coordinated care with providers and counseling re: treatment plan and workup    MDM: High complexity

## 2024-05-08 NOTE — DIETARY NOTE
BRIEF DIETITIAN NOTE      Patient Status 05/08/24: Pt screened for wound alert. Pt admitted for Acute on Chronic CHF. Visited pt today, pt hard of hearing, daughter at bedside. Pt reported good appetite. Pt having variable PO intakes, consuming % of meals per documentation. Pt endorsed good appetite PTA, eating 2 meals per day, daughter reported pt consumes 1 Ensure ONS on most days. Weight relatively stable no weight loss noted per Wt Hx on EHR. Pt with stg 2 PI to buttocks, of too nsmall size to warrant nutrition intervention. Pt with variable intakes however, thus added ONS to help pt maximize protein/energy intakes. Pt and daughter agreeable to Ensure and Magic cup chocolate. PT to be discharged to SNF per SW note. Will follow per protocol. Please consult RD if earlier nutrition intervention is needed.       Percent Meals Eaten (last 6 days)       Date/Time Percent Meals Eaten (%)    05/07/24 0900 30 %    05/07/24 1500 20 %    05/08/24 0500 0 %    05/08/24 0900 100 %    05/08/24 1300 50 %             Patient Weight(s) for the past 336 hrs:   Weight   05/08/24 0500 54.8 kg (120 lb 14.4 oz)   05/06/24 2351 56 kg (123 lb 6.4 oz)   05/06/24 2200 51 kg (112 lb 7 oz)        Wt Readings from Last 6 Encounters:   05/08/24 54.8 kg (120 lb 14.4 oz)   05/04/24 51.5 kg (113 lb 8 oz)   06/28/23 47.6 kg (105 lb)   05/11/23 52.6 kg (116 lb)   04/05/23 56.5 kg (124 lb 9.6 oz)   03/29/23 56.6 kg (124 lb 11.2 oz)        Medical Food Supplements-RD added Ensure Compact (220 calories/ 9 g protein each) BID Chocolate and Magic Cup (290 calories/ 9 g protein each) BID Orange, Vanilla, or Chocolate. Rational/use of oral supplements discussed. Will monitor  for tolerance and adequacy.     F/u per protocol or as appropriate.       Reena Solis RD, LDN  Clinical Dietitian  Office: [phone]

## 2024-05-08 NOTE — PLAN OF CARE
Problem: Patient Centered Care  Goal: Patient preferences are identified and integrated in the patient's plan of care  Description: Interventions:  - What would you like us to know as we care for you? From home alone / was at [facility] prior to admission  - Provide timely, complete, and accurate information to patient/family  - Incorporate patient and family knowledge, values, beliefs, and cultural backgrounds into the planning and delivery of care  - Encourage patient/family to participate in care and decision-making at the level they choose  - Honor patient and family perspectives and choices  Outcome: Progressing     Problem: Patient/Family Goals  Goal: Patient/Family Long Term Goal  Description: Patient's Long Term Goal: to return home    Interventions:  - vital signs  - follow md orders  - See additional Care Plan goals for specific interventions  Outcome: Progressing  Goal: Patient/Family Short Term Goal  Description: Patient's Short Term Goal: to get better    Interventions:   - follow medication plan  - See additional Care Plan goals for specific interventions  Outcome: Progressing     Problem: CARDIOVASCULAR - ADULT  Goal: Maintains optimal cardiac output and hemodynamic stability  Description: INTERVENTIONS:  - Monitor vital signs, rhythm, and trends  - Monitor for bleeding, hypotension and signs of decreased cardiac output  - Evaluate effectiveness of vasoactive medications to optimize hemodynamic stability  - Monitor arterial and/or venous puncture sites for bleeding and/or hematoma  - Assess quality of pulses, skin color and temperature  - Assess for signs of decreased coronary artery perfusion - ex. Angina  - Evaluate fluid balance, assess for edema, trend weights  Outcome: Progressing  Goal: Absence of cardiac arrhythmias or at baseline  Description: INTERVENTIONS:  - Continuous cardiac monitoring, monitor vital signs, obtain 12 lead EKG if indicated  - Evaluate effectiveness of antiarrhythmic and  heart rate control medications as ordered  - Initiate emergency measures for life threatening arrhythmias  - Monitor electrolytes and administer replacement therapy as ordered  Outcome: Progressing     Problem: METABOLIC/FLUID AND ELECTROLYTES - ADULT  Goal: Electrolytes maintained within normal limits  Description: INTERVENTIONS:  - Monitor labs and rhythm and assess patient for signs and symptoms of electrolyte imbalances  - Administer electrolyte replacement as ordered  - Monitor response to electrolyte replacements, including rhythm and repeat lab results as appropriate  - Fluid restriction as ordered  - Instruct patient on fluid and nutrition restrictions as appropriate  Outcome: Progressing     Problem: SKIN/TISSUE INTEGRITY - ADULT  Goal: Skin integrity remains intact  Description: INTERVENTIONS  - Assess and document risk factors for pressure ulcer development  - Assess and document skin integrity  - Monitor for areas of redness and/or skin breakdown  - Initiate interventions, skin care algorithm/standards of care as needed  Outcome: Progressing  Goal: Incision(s), wounds(s) or drain site(s) healing without S/S of infection  Description: INTERVENTIONS:  - Assess and document risk factors for pressure ulcer development  - Assess and document skin integrity  - Assess and document dressing/incision, wound bed, drain sites and surrounding tissue  - Implement wound care per orders  - Initiate isolation precautions as appropriate  - Initiate Pressure Ulcer prevention bundle as indicated  Outcome: Progressing

## 2024-05-08 NOTE — PROGRESS NOTES
Progress Note  Lina Dudley Patient Status:  Inpatient    1926 MRN [id number]   Location [facility] 3W/SW Attending Jemima Chaudhry MD   Hosp Day # 2 PCP ROBYN KULKARNI MD     Subjective:  Pt denies any chest pain, SOB or dizziness.   Objective:  BP 91/68 (BP Location: Right arm)   Pulse 87   Temp 97.6 °F (36.4 °C) (Oral)   Resp 18   Wt 120 lb 14.4 oz (54.8 kg)   SpO2 97%   BMI 20.12 kg/m²     Telemetry: afib    Intake/Output:    Intake/Output Summary (Last 24 hours) at 2024 1504  Last data filed at 2024 1447  Gross per 24 hour   Intake 720 ml   Output 350 ml   Net 370 ml       Last 3 Weights   24 0500 120 lb 14.4 oz (54.8 kg)   24 2351 123 lb 6.4 oz (56 kg)   24 2200 112 lb 7 oz (51 kg)   24 0444 113 lb 8 oz (51.5 kg)   24 0539 112 lb 14.4 oz (51.2 kg)   24 0320 112 lb 11.2 oz (51.1 kg)   24 1518 112 lb 11.2 oz (51.1 kg)   24 0958 120 lb (54.4 kg)   23 1750 105 lb (47.6 kg)       Labs:  Recent Labs   Lab 24  19424  0117 24  0623 24  1550 24  1948 24  0724   GLU 89 85  --   --   --  94   BUN 85* 59*  --   --   --  74*   CREATSERUM 1.73* 1.69*  --   --   --  1.50*   EGFRCR 27* 27*  --   --   --  31*   CA 8.9 8.5*  --   --   --  8.1*   * 130*  --   --   --  130*   K 6.1* 6.0*   < > 4.7 5.1 4.3   CL 98 100  --   --   --  97*   CO2 19.0* 17.0*  --   --   --  24.0    < > = values in this interval not displayed.     Recent Labs   Lab 24  0751 24  0426 24  0117   RBC 4.56 4.39 4.68 4.51   HGB 15.7 14.4 15.1 14.6   HCT 45.3 42.5 45.0 43.7   MCV 99.3 96.8 96.2 96.9   MCH 34.4* 32.8 32.3 32.4   MCHC 34.7 33.9 33.6 33.4   RDW 15.4* 15.0 16.8* 16.3*   NEPRELIM 7.47 9.74* 13.16*  --    WBC 9.6 12.3* 15.7* 16.8*   .0 224.0 233.0 209.0         Recent Labs   Lab 24  0623   TROPHS 161* 58* 52*   CK  --   --  65     No  results found for: \"PT\", \"INR\"    Diagnostics:       Review of Systems   Respiratory: Negative.     Cardiovascular: Negative.        Physical Exam:    Physical Exam  Vitals reviewed.   Constitutional:       General: She is not in acute distress.  Neck:      Vascular: No JVD.   Cardiovascular:      Rate and Rhythm: Normal rate. Rhythm irregularly irregular.      Pulses:           Radial pulses are 2+ on the right side and 2+ on the left side.        Dorsalis pedis pulses are 2+ on the right side and 2+ on the left side.      Heart sounds: S1 normal and S2 normal. Murmur heard.      Systolic murmur is present with a grade of 2/6.      Comments: Trace edema to BLE  Pulmonary:      Effort: No respiratory distress.      Breath sounds: No stridor. No wheezing, rhonchi or rales.   Abdominal:      General: Abdomen is flat.      Palpations: Abdomen is soft.   Musculoskeletal:      Cervical back: Normal range of motion.      Right lower leg: Edema present.      Left lower leg: Edema present.   Skin:     General: Skin is warm and dry.   Neurological:      General: No focal deficit present.      Mental Status: She is alert.   Psychiatric:         Mood and Affect: Mood normal.         Medications:   metoprolol tartrate  12.5 mg Oral 2x Daily(Beta Blocker)    heparin  5,000 Units Subcutaneous 2 times per day         Assessment/Plan:  Chronic respiratory failure secondary to COPD  - on 2L NC now chronically on 1L at home per pt  - chest xray: stable small right pleural effusion  - inhalers per PCP  - euvolemic on exam  - [id number]  - echo from 5/2 with LVEF 60-65, no RWMA, moderate MR, Severe TR, PASP 69mmHg  -pulmonary following    New onset afib  - noted to be in rate controlled afib, intermittently, mostly provoked by respiratory failure  - on metoprolol 12.5 BID  - [id number] score of 4  - with her frequent fall episodes high risk for bleeding     Pulmonary hypertension     Plan:  - Continue current medication  - Euvolemic on  exam, small pleural effusion on xray, low BP, will hold off giving lasix today with low BP.   - further management per primary        Resmi TAISHA Mata  [facility]  5/8/2024  3:04 PM    Addendum:  Patient seen and examined  Agree with the assessment and the management plan as outlined    Van Nix MD  L3

## 2024-05-08 NOTE — PLAN OF CARE
Problem: Patient Centered Care  Goal: Patient preferences are identified and integrated in the patient's plan of care  Description: Interventions:  - What would you like us to know as we care for you? From home alone / was at [facility] prior to admission  - Provide timely, complete, and accurate information to patient/family  - Incorporate patient and family knowledge, values, beliefs, and cultural backgrounds into the planning and delivery of care  - Encourage patient/family to participate in care and decision-making at the level they choose  - Honor patient and family perspectives and choices  Outcome: Progressing     Problem: Patient/Family Goals  Goal: Patient/Family Long Term Goal  Description: Patient's Long Term Goal: to return home    Interventions:  - vital signs  - follow md orders  - See additional Care Plan goals for specific interventions  Outcome: Progressing  Goal: Patient/Family Short Term Goal  Description: Patient's Short Term Goal: to get better    Interventions:   - follow medication plan  - See additional Care Plan goals for specific interventions  Outcome: Progressing     Problem: CARDIOVASCULAR - ADULT  Goal: Maintains optimal cardiac output and hemodynamic stability  Description: INTERVENTIONS:  - Monitor vital signs, rhythm, and trends  - Monitor for bleeding, hypotension and signs of decreased cardiac output  - Evaluate effectiveness of vasoactive medications to optimize hemodynamic stability  - Monitor arterial and/or venous puncture sites for bleeding and/or hematoma  - Assess quality of pulses, skin color and temperature  - Assess for signs of decreased coronary artery perfusion - ex. Angina  - Evaluate fluid balance, assess for edema, trend weights  Outcome: Progressing  Goal: Absence of cardiac arrhythmias or at baseline  Description: INTERVENTIONS:  - Continuous cardiac monitoring, monitor vital signs, obtain 12 lead EKG if indicated  - Evaluate effectiveness of antiarrhythmic and  heart rate control medications as ordered  - Initiate emergency measures for life threatening arrhythmias  - Monitor electrolytes and administer replacement therapy as ordered  Outcome: Progressing     Problem: METABOLIC/FLUID AND ELECTROLYTES - ADULT  Goal: Electrolytes maintained within normal limits  Description: INTERVENTIONS:  - Monitor labs and rhythm and assess patient for signs and symptoms of electrolyte imbalances  - Administer electrolyte replacement as ordered  - Monitor response to electrolyte replacements, including rhythm and repeat lab results as appropriate  - Fluid restriction as ordered  - Instruct patient on fluid and nutrition restrictions as appropriate  Outcome: Progressing     Problem: SKIN/TISSUE INTEGRITY - ADULT  Goal: Skin integrity remains intact  Description: INTERVENTIONS  - Assess and document risk factors for pressure ulcer development  - Assess and document skin integrity  - Monitor for areas of redness and/or skin breakdown  - Initiate interventions, skin care algorithm/standards of care as needed  Outcome: Progressing  Goal: Incision(s), wounds(s) or drain site(s) healing without S/S of infection  Description: INTERVENTIONS:  - Assess and document risk factors for pressure ulcer development  - Assess and document skin integrity  - Assess and document dressing/incision, wound bed, drain sites and surrounding tissue  - Implement wound care per orders  - Initiate isolation precautions as appropriate  - Initiate Pressure Ulcer prevention bundle as indicated  Outcome: Progressing

## 2024-05-08 NOTE — PHYSICAL THERAPY NOTE
PHYSICAL THERAPY TREATMENT NOTE - INPATIENT     Room Number: 346/346-A       Presenting Problem: Acute CHF  Co-Morbidities : COPD, sciatica, recent admission (5/1-5/4) for fall with discharge to [facility]    Problem List  Principal Problem:    Acute on chronic congestive heart failure, unspecified heart failure type (HCC)  Active Problems:    Acute on chronic congestive heart failure ([facility])    Hyperkalemia    QUITA (acute kidney injury) ([facility])      PHYSICAL THERAPY ASSESSMENT   Patient demonstrates fair progress this session, goals  remain in progress.    Patient continues to function below baseline with bed mobility, transfers, gait, stair negotiation, maintaining seated position, standing prolonged periods, and performing household tasks.  Contributing factors to remaining limitations include decreased functional strength, decreased endurance/aerobic capacity, impaired standing balance, impaired motor planning, difficulty maintaining precautions, and medical status.  Next session anticipate patient to progress bed mobility, transfers, gait, stair negotiation, maintaining seated position, standing prolonged periods, and performing household tasks.  Physical Therapy will continue to follow patient for duration of hospitalization.    Patient continues to benefit from continued skilled PT services: to promote return to prior level of function and safety with continuous assistance and gradual rehabilitative therapy .    PLAN  PT Treatment Plan: Bed mobility;Body mechanics;Endurance;Energy conservation;Patient education;Gait training;Range of motion;Strengthening;Transfer training;Balance training  Frequency (Obs): 3-5x/week    SUBJECTIVE  I fel better today but still very weak. I even need help to eat.     OBJECTIVE  Precautions: Bed/chair alarm    WEIGHT BEARING RESTRICTION                PAIN ASSESSMENT   Rating: Unable to rate          BALANCE  Static Sitting: Poor  Dynamic Sitting: Poor -  Static Standing: Dependent  Dynamic  Standing: Dependent    ACTIVITY TOLERANCE  Pulse: 87  Heart Rate Source: Monitor  Resp: 18                 O2 WALK  Oxygen Therapy  SPO2% on Oxygen at Rest: 94  At rest oxygen flow (liters per minute): 3  SPO2% Ambulation on Oxygen: 92  Ambulation oxygen flow (liters per minute): 3    AM-PAC '6-Clicks' INPATIENT SHORT FORM - BASIC MOBILITY  How much difficulty does the patient currently have...  Patient Difficulty: Turning over in bed (including adjusting bedclothes, sheets and blankets)?: A Lot   Patient Difficulty: Sitting down on and standing up from a chair with arms (e.g., wheelchair, bedside commode, etc.): A Lot   Patient Difficulty: Moving from lying on back to sitting on the side of the bed?: A Lot   How much help from another person does the patient currently need...   Help from Another: Moving to and from a bed to a chair (including a wheelchair)?: Total   Help from Another: Need to walk in hospital room?: Total   Help from Another: Climbing 3-5 steps with a railing?: Total     AM-PAC Score:  Raw Score: 9   Approx Degree of Impairment: 81.38%   Standardized Score (AM-PAC Scale): 30.55   CMS Modifier (G-Code): CM    FUNCTIONAL ABILITY STATUS  Functional Mobility/Gait Assessment  Gait Assistance: Maximum assistance  Distance (ft): Gustavo to chair  Rolling: moderate assist  Supine to Sit: maximum assist  Sit to Supine: maximum assist  Sit to Stand: maximum assist    Additional information: Pt ed with bed mobility and transfers with max A for all bed mobility and transfers. Pt ed with use of gustavo to chair dependant transfers at this time pt is not ambulatory with elevated fall risk d/t profound weakness. Therex in chair as tolerated. PCT assisting with eating d/t weakness.     The patient's Approx Degree of Impairment: 81.38% has been calculated based on documentation in the [facility] '6 clicks' Inpatient Daily Activity Short Form.  Research supports that patients with this level of impairment may benefit from IP  Rehab PT.  Final disposition will be made by interdisciplinary medical team.    THERAPEUTIC EXERCISES  Lower Extremity Ankle pumps  Heel raises  LAQ     Position Sitting       Patient End of Session: Up in chair;With  staff;Call light within reach;Needs met;RN aware of session/findings;All patient questions and concerns addressed;Alarm set (PCT assisting with meal)    CURRENT GOALS   Goals to be met by: 5/20/2024  Patient Goal Patient's self-stated goal is: Return home    Goal #1 Patient is able to demonstrate supine - sit EOB @ level: independent      Goal #1   Current Status  Mod A    Goal #2 Patient is able to demonstrate transfers Sit to/from Stand at assistance level: modified independent with walker - rolling      Goal #2  Current Status  Max A    Goal #3 Patient is able to ambulate 50 feet with assist device: walker - rolling at assistance level: modified independent   Goal #3   Current Status  Gustavo transfers to chair   Goal #4 Patient will negotiate 2 stairs/one curb w/ assistive device and supervision   Goal #4   Current Status  Unable   Goal #5 Patient to demonstrate independence with home activity/exercise instructions provided to patient in preparation for discharge.   Goal #5   Current Status  ongoing     Therapeutic Activity: 12 minutes

## 2024-05-08 NOTE — CONGREGATE LIVING REVIEW
[facility] Living Authorization    The [facility] Review Committee has reviewed this case and the patient IS APPROVED for discharge to a facility for Short Term Skilled once the following procedure is followed:     - The physician discharge instructions (contained within the ZARA note for SNF) must inlcude the below appropriate and approved COVID instructions to the facility    For questions regarding CLRC approval process, please contact the CM assigned to the case.  For questions regarding RN discharge workflow, please contact the unit Clinical Leader.

## 2024-05-09 VITALS
WEIGHT: 113.19 LBS | RESPIRATION RATE: 16 BRPM | SYSTOLIC BLOOD PRESSURE: 104 MMHG | OXYGEN SATURATION: 98 % | HEART RATE: 81 BPM | DIASTOLIC BLOOD PRESSURE: 68 MMHG | BODY MASS INDEX: 19 KG/M2 | TEMPERATURE: 97 F

## 2024-05-09 LAB
ANION GAP SERPL CALC-SCNC: 7 MMOL/L (ref 0–18)
BASOPHILS # BLD AUTO: 0.01 X10(3) UL (ref 0–0.2)
BASOPHILS NFR BLD AUTO: 0.1 %
BUN BLD-MCNC: 65 MG/DL (ref 9–23)
BUN/CREAT SERPL: 50.4 (ref 10–20)
CALCIUM BLD-MCNC: 8.7 MG/DL (ref 8.7–10.4)
CHLORIDE SERPL-SCNC: 99 MMOL/L (ref 98–112)
CO2 SERPL-SCNC: 26 MMOL/L (ref 21–32)
CREAT BLD-MCNC: 1.29 MG/DL
DEPRECATED RDW RBC AUTO: 52 FL (ref 35.1–46.3)
EGFRCR SERPLBLD CKD-EPI 2021: 38 ML/MIN/1.73M2 (ref 60–?)
EOSINOPHIL # BLD AUTO: 0.11 X10(3) UL (ref 0–0.7)
EOSINOPHIL NFR BLD AUTO: 1 %
ERYTHROCYTE [DISTWIDTH] IN BLOOD BY AUTOMATED COUNT: 16.3 % (ref 11–15)
GLUCOSE BLD-MCNC: 113 MG/DL (ref 70–99)
HCT VFR BLD AUTO: 43.7 %
HGB BLD-MCNC: 14.6 G/DL
IMM GRANULOCYTES # BLD AUTO: 0.06 X10(3) UL (ref 0–1)
IMM GRANULOCYTES NFR BLD: 0.6 %
LYMPHOCYTES # BLD AUTO: 0.86 X10(3) UL (ref 1–4)
LYMPHOCYTES NFR BLD AUTO: 8.2 %
MAGNESIUM SERPL-MCNC: 2.9 MG/DL (ref 1.6–2.6)
MCH RBC QN AUTO: 32.9 PG (ref 26–34)
MCHC RBC AUTO-ENTMCNC: 33.4 G/DL (ref 31–37)
MCV RBC AUTO: 98.4 FL
MONOCYTES # BLD AUTO: 0.96 X10(3) UL (ref 0.1–1)
MONOCYTES NFR BLD AUTO: 9.1 %
NEUTROPHILS # BLD AUTO: 8.53 X10 (3) UL (ref 1.5–7.7)
NEUTROPHILS # BLD AUTO: 8.53 X10(3) UL (ref 1.5–7.7)
NEUTROPHILS NFR BLD AUTO: 81 %
OSMOLALITY SERPL CALC.SUM OF ELEC: 293 MOSM/KG (ref 275–295)
PLATELET # BLD AUTO: 157 10(3)UL (ref 150–450)
POTASSIUM SERPL-SCNC: 4.4 MMOL/L (ref 3.5–5.1)
RBC # BLD AUTO: 4.44 X10(6)UL
SODIUM SERPL-SCNC: 132 MMOL/L (ref 136–145)
WBC # BLD AUTO: 10.5 X10(3) UL (ref 4–11)

## 2024-05-09 PROCEDURE — 99239 HOSP IP/OBS DSCHRG MGMT >30: CPT | Performed by: HOSPITALIST

## 2024-05-09 NOTE — PHYSICAL THERAPY NOTE
PHYSICAL THERAPY TREATMENT NOTE - INPATIENT     Room Number: 346/346-A       Presenting Problem: Acute CHF  Co-Morbidities : COPD, sciatica, recent admission (5/1-5/4) for fall with discharge to [facility]    Problem List  Principal Problem:    Acute on chronic congestive heart failure, unspecified heart failure type (HCC)  Active Problems:    Acute on chronic congestive heart failure ([facility])    Hyperkalemia    QUITA (acute kidney injury) ([facility])      PHYSICAL THERAPY ASSESSMENT   Patient demonstrates limited progress this session, goals  remain in progress.    Patient continues to function below baseline with bed mobility, transfers, gait, stair negotiation, maintaining seated position, standing prolonged periods, performing household tasks, and wheelchair mobility.  Contributing factors to remaining limitations include decreased functional strength, decreased endurance/aerobic capacity, impaired sitting /standing balance, impaired coordination, impaired motor planning, decreased muscular endurance, and medical status.  Next session anticipate patient to progress bed mobility, transfers, gait, stair negotiation, maintaining seated position, standing prolonged periods, and performing household tasks.  Physical Therapy will continue to follow patient for duration of hospitalization.    Patient continues to benefit from continued skilled PT services: to promote return to prior level of function and safety with continuous assistance and gradual rehabilitative therapy .    PLAN  PT Treatment Plan: Bed mobility;Body mechanics;Endurance;Energy conservation;Patient education;Gait training;Strengthening;Transfer training;Balance training  Frequency (Obs): 3-5x/week    SUBJECTIVE  I feel too exhausted today for the chair I prefer to remain in bed after my PT session. I am so weak I need rest.     OBJECTIVE  Precautions: Bed/chair alarm    WEIGHT BEARING RESTRICTION                PAIN ASSESSMENT   Rating: Unable to rate           BALANCE  Static Sitting: Poor  Dynamic Sitting: Poor -  Static Standing: Dependent  Dynamic Standing: Dependent    ACTIVITY TOLERANCE  Pulse: 86  Heart Rate Source: Monitor  Resp: 16                 O2 WALK  Oxygen Therapy  SPO2% on Oxygen at Rest: 94  At rest oxygen flow (liters per minute): 3  SPO2% Ambulation on Oxygen: 91  Ambulation oxygen flow (liters per minute): 3    AM-PAC '6-Clicks' INPATIENT SHORT FORM - BASIC MOBILITY  How much difficulty does the patient currently have...  Patient Difficulty: Turning over in bed (including adjusting bedclothes, sheets and blankets)?: A Lot   Patient Difficulty: Sitting down on and standing up from a chair with arms (e.g., wheelchair, bedside commode, etc.): A Lot   Patient Difficulty: Moving from lying on back to sitting on the side of the bed?: A Lot   How much help from another person does the patient currently need...   Help from Another: Moving to and from a bed to a chair (including a wheelchair)?: Total   Help from Another: Need to walk in hospital room?: Total   Help from Another: Climbing 3-5 steps with a railing?: Total     AM-PAC Score:  Raw Score: 9   Approx Degree of Impairment: 81.38%   Standardized Score (AM-PAC Scale): 30.55   CMS Modifier (G-Code): CM    FUNCTIONAL ABILITY STATUS  Functional Mobility/Gait Assessment  Gait Assistance: Maximum assistance  Distance (ft): gustavo  Rolling: moderate assist  Supine to Sit: moderate assist  Sit to Supine: dependent  Sit to Stand: dependent    Additional information: Pt ed with therex in bed for AAROM of UE's and LE's as tolerable. Mod to max A for repositioning rolling for pressure relief and supine to sit in trace sitting. Pt returned to semi maloney position for comfort at end of session. Gustavo for transfers out of bed but pt refused chair during PT am session.     The patient's Approx Degree of Impairment: 81.38% has been calculated based on documentation in the [facility] '6 clicks' Inpatient Daily Activity Short  Form.  Research supports that patients with this level of impairment may benefit from IP Rehab.  Final disposition will be made by interdisciplinary medical team.    THERAPEUTIC EXERCISES  Lower Extremity Ankle pumps  Heel slides  SLR     Position Sitting and Supine       Patient End of Session: In bed;With  staff;Needs met;Call light within reach;RN aware of session/findings;All patient questions and concerns addressed;Alarm set    CURRENT GOALS   Goals to be met by: 5/20/2024  Patient Goal Patient's self-stated goal is: Return home    Goal #1 Patient is able to demonstrate supine - sit EOB @ level: independent      Goal #1   Current Status  Mod A    Goal #2 Patient is able to demonstrate transfers Sit to/from Stand at assistance level: modified independent with walker - rolling      Goal #2  Current Status  Max A    Goal #3 Patient is able to ambulate 50 feet with assist device: walker - rolling at assistance level: modified independent   Goal #3   Current Status  Gustavo transfers to chair   Goal #4 Patient will negotiate 2 stairs/one curb w/ assistive device and supervision   Goal #4   Current Status  Unable   Goal #5 Patient to demonstrate independence with home activity/exercise instructions provided to patient in preparation for discharge.   Goal #5   Current Status  ongoing       Therapeutic Activity: 10 minutes

## 2024-05-09 NOTE — PLAN OF CARE
Patient is to be discharged to [facility] at 3 pm via [place], report was given to incoming nurse [place]. Person of contact, Christina Pike, was called and notified of transfer.  POLST form is printed and discharge instructions will be sent with [place]. Patient is alert and oriented to person and place. Patient is on 2L O2 via [place]. Patient refused PT in the morning. Safety measures are in place.    Problem: Patient Centered Care  Goal: Patient preferences are identified and integrated in the patient's plan of care  Description: Interventions:  - What would you like us to know as we care for you? From home alone / was at [facility] prior to admission  - Provide timely, complete, and accurate information to patient/family  - Incorporate patient and family knowledge, values, beliefs, and cultural backgrounds into the planning and delivery of care  - Encourage patient/family to participate in care and decision-making at the level they choose  - Honor patient and family perspectives and choices  Outcome: Completed     Problem: Patient/Family Goals  Goal: Patient/Family Long Term Goal  Description: Patient's Long Term Goal: to return home    Interventions:  - vital signs  - follow md orders  - See additional Care Plan goals for specific interventions  Outcome: Completed  Goal: Patient/Family Short Term Goal  Description: Patient's Short Term Goal: to get better    Interventions:   - follow medication plan  - See additional Care Plan goals for specific interventions  Outcome: Completed     Problem: CARDIOVASCULAR - ADULT  Goal: Maintains optimal cardiac output and hemodynamic stability  Description: INTERVENTIONS:  - Monitor vital signs, rhythm, and trends  - Monitor for bleeding, hypotension and signs of decreased cardiac output  - Evaluate effectiveness of vasoactive medications to optimize hemodynamic stability  - Monitor arterial and/or venous puncture sites for bleeding and/or hematoma  - Assess quality of pulses,  skin color and temperature  - Assess for signs of decreased coronary artery perfusion - ex. Angina  - Evaluate fluid balance, assess for edema, trend weights  Outcome: Completed  Goal: Absence of cardiac arrhythmias or at baseline  Description: INTERVENTIONS:  - Continuous cardiac monitoring, monitor vital signs, obtain 12 lead EKG if indicated  - Evaluate effectiveness of antiarrhythmic and heart rate control medications as ordered  - Initiate emergency measures for life threatening arrhythmias  - Monitor electrolytes and administer replacement therapy as ordered  Outcome: Completed     Problem: METABOLIC/FLUID AND ELECTROLYTES - ADULT  Goal: Electrolytes maintained within normal limits  Description: INTERVENTIONS:  - Monitor labs and rhythm and assess patient for signs and symptoms of electrolyte imbalances  - Administer electrolyte replacement as ordered  - Monitor response to electrolyte replacements, including rhythm and repeat lab results as appropriate  - Fluid restriction as ordered  - Instruct patient on fluid and nutrition restrictions as appropriate  Outcome: Completed     Problem: SKIN/TISSUE INTEGRITY - ADULT  Goal: Skin integrity remains intact  Description: INTERVENTIONS  - Assess and document risk factors for pressure ulcer development  - Assess and document skin integrity  - Monitor for areas of redness and/or skin breakdown  - Initiate interventions, skin care algorithm/standards of care as needed  Outcome: Completed  Goal: Incision(s), wounds(s) or drain site(s) healing without S/S of infection  Description: INTERVENTIONS:  - Assess and document risk factors for pressure ulcer development  - Assess and document skin integrity  - Assess and document dressing/incision, wound bed, drain sites and surrounding tissue  - Implement wound care per orders  - Initiate isolation precautions as appropriate  - Initiate Pressure Ulcer prevention bundle as indicated  Outcome: Completed

## 2024-05-09 NOTE — DISCHARGE SUMMARY
[facility]  part of [facility]    Discharge Summary    Lina Dudley Patient Status:  Inpatient    1926 MRN [id number]   Location [facility] 3W/SW Attending Jemima Chaudhry MD   Hosp Day # 3 PCP ROBYN KULKARNI MD     Date of Admission: 2024      Date of Discharge: 24      Admitting Diagnosis: Hyperkalemia [E87.5]  QUITA (acute kidney injury) (HCC) [N17.9]  Acute on chronic congestive heart failure, unspecified heart failure type (HCC) [I50.9]    Hospital Discharge Diagnoses:  CHF, QUITA    Lace+ Score: 71  59-90 High Risk  29-58 Medium Risk  0-28   Low Risk.    TCM Follow-Up Recommendation:  LACE > 58: High Risk of readmission after discharge from the hospital.          Problem List:   Patient Active Problem List   Diagnosis    Sciatica of right side    Mixed hyperlipidemia    Medicare annual wellness visit, subsequent    Chronic obstructive pulmonary disease (HCC)    Macular degeneration    Presbyopia    Venous insufficiency    Osteoarthritis    Urinary frequency    Constipation    Elevated troponin I level    Fall, initial encounter    Closed fracture of ninth thoracic vertebra, unspecified fracture morphology, initial encounter (HCC)    Protein-calorie malnutrition, unspecified severity (HCC)    Acute on chronic congestive heart failure (HCC)    Acute on chronic respiratory failure with hypoxia (HCC)    Goals of care, counseling/discussion    Advance care planning    Palliative care by specialist    Hyperkalemia    Acute on chronic congestive heart failure, unspecified heart failure type (HCC)    QUITA (acute kidney injury) (HCC)         Physical Exam:     Gen: No acute distress  Pulm: Lungs clear, normal respiratory effort  CV: Heart with regular rate and rhythm  Abd: Abdomen soft, nontender, nondistended, bowel sounds present  Neuro: No acute focal deficits  MSK: Full range of motion in extremities  Skin: Warm and dry  Psych: Normal affect  Ext: no  c/c/e      History of Present Illness: Per Dr [place] LESLEY Dudley is a 89 year old female with hx of COPD, pulmonary hypertension, and HFpEF, who p/w dyspnea for 1 day. Patient was just hospitalized 5/1-5/4/24 for UTI, QUITA and HFpEF. She was seen by Cards and Pulm. She was discharged to [facility]. No diuretic at discharge. Patient developed dyspnea this morning 5/6/24. Oxygen was increased from 2L to 4L with SpO2 of 94%. Patient was seen by NP at the time. No other history was available because patient was very lethargic in the ED. When she was sent to ED by EMS, patient has SpO2 99% on 4L. Blood work has na 130, K 6.1, Cr 1.7, WBC 16. CXR has more infiltrates and pleural effusion than CXR 5/1/24. Lokelma, dextrose/insulin and calcium given in ED.     Hospital Course:     # HFpEF exacerbation  - Echo from 5/2 with LVEF 60-65%, no RWMA, moderate MR, Severe TR, PASP 69mmHg   - now patient has higher BNP, and more infiltrates and pleural effusion on CXR  - Cards consult appreciated   - given lower pressures holding further diuresis  - follow up as outpt     # Acute kidney injury  - Cr 1.1 on 5/3/24, now 1.7 on arrival, BMP in AM shows Cr of 1.29     # Hyperkalemia  - K 6.1 on arrival  - Lokelma, dextrose/insulin and calcium given in ED, repeat in AM  -repeat K improved  -plan low potassium diet     # Hyponatremia  - Na 130 on arrival, mild, will monitor     # Chronic hypoxemic respiratory failure  # Possible mild early interstitial lung disease   # Pulmonary hypertension  # COPD  # Pneumonia  - was on intermittent 1L O2 at home, now SpO2 96% on 2L  -? Empiric abx. for underlying pneumonia  -check procal - slightly elevated but she is not symptomatic and cxr shows improvement so no abx for dc     # Elevated troponin  - troponin 171 on 5/1/24, now down to 58 on arrival     # UTI  - urine culture 5/1/24 grew Klebsiella, sensitive to cefazolin, has been receiving abx since, on cefadroxil at [facility].  -  treated     # Debility  - patient lived alone with independent ADLs prior to the hospitalization on 5/1/24  - PT/OT - return to [facility]    Discharge Condition: Stable    Discharge Medications:      Discharge Medications        START taking these medications        Instructions Prescription details   metoprolol tartrate 25 MG Tabs  Commonly known as: Lopressor      Take 0.5 tablets (12.5 mg total) by mouth 2x Daily(Beta Blocker).   Quantity: 60 tablet  Refills: 0            CONTINUE taking these medications        Instructions Prescription details   acetaminophen 500 MG Tabs  Commonly known as: Tylenol Extra Strength      Take 1 tablet (500 mg total) by mouth every 4 (four) hours as needed for Fever (equal to or greater than 100.4).   Refills: 0     albuterol 108 (90 Base) MCG/ACT Aers  Commonly known as: Ventolin HFA      inhale 2 puff by inhalation route  every 4 - 6 hours as needed   Quantity: 1 each  Refills: 5     atorvastatin 10 MG Tabs  Commonly known as: Lipitor      Take 1 tablet (10 mg total) by mouth nightly.   Quantity: 30 tablet  Refills: 5     docusate sodium 100 MG Caps  Commonly known as: COLACE      Take 100 mg by mouth 2 (two) times daily.   Refills: 0     fluticasone furoate-vilanterol 100-25 MCG/ACT Aepb  Commonly known as: Breo Ellipta      Inhale 1 puff into the lungs every morning. To follow with gargle, rinse, & spit after using BREO to help reduce your chance of getting thrush.   Quantity: 1 each  Refills: 5     ipratropium-albuterol 0.5-2.5 (3) MG/3ML Soln  Commonly known as: Duoneb      Take 3 mL by nebulization every 6 (six) hours as needed.   Refills: 0     Polyethylene Glycol 3350 17 g Pack  Commonly known as: MIRALAX      Take 17 g by mouth daily as needed.   Refills: 0            STOP taking these medications      cefadroxil 500 MG Caps  Commonly known as: DURICEF                  Where to Get Your Medications        These medications [id number] [place] [facility] [place] [facility] [address] [phone], [phone]  [address] [address]      Phone: [phone]   metoprolol tartrate 25 MG Tabs             Jemima Chaudhry MD  5/9/2024  12:26 PM    Greater than 30 minutes spent on preparation and coordination of this discharge

## 2024-05-09 NOTE — PLAN OF CARE
Problem: Patient Centered Care  Goal: Patient preferences are identified and integrated in the patient's plan of care  Description: Interventions:  - What would you like us to know as we care for you? From home alone / was at [facility] prior to admission  - Provide timely, complete, and accurate information to patient/family  - Incorporate patient and family knowledge, values, beliefs, and cultural backgrounds into the planning and delivery of care  - Encourage patient/family to participate in care and decision-making at the level they choose  - Honor patient and family perspectives and choices  Outcome: Progressing     Problem: Patient/Family Goals  Goal: Patient/Family Long Term Goal  Description: Patient's Long Term Goal: to return home    Interventions:  - vital signs  - follow md orders  - See additional Care Plan goals for specific interventions  Outcome: Progressing  Goal: Patient/Family Short Term Goal  Description: Patient's Short Term Goal: to get better    Interventions:   - follow medication plan  - See additional Care Plan goals for specific interventions  Outcome: Progressing     Problem: CARDIOVASCULAR - ADULT  Goal: Maintains optimal cardiac output and hemodynamic stability  Description: INTERVENTIONS:  - Monitor vital signs, rhythm, and trends  - Monitor for bleeding, hypotension and signs of decreased cardiac output  - Evaluate effectiveness of vasoactive medications to optimize hemodynamic stability  - Monitor arterial and/or venous puncture sites for bleeding and/or hematoma  - Assess quality of pulses, skin color and temperature  - Assess for signs of decreased coronary artery perfusion - ex. Angina  - Evaluate fluid balance, assess for edema, trend weights  Outcome: Progressing  Goal: Absence of cardiac arrhythmias or at baseline  Description: INTERVENTIONS:  - Continuous cardiac monitoring, monitor vital signs, obtain 12 lead EKG if indicated  - Evaluate effectiveness of antiarrhythmic and  heart rate control medications as ordered  - Initiate emergency measures for life threatening arrhythmias  - Monitor electrolytes and administer replacement therapy as ordered  Outcome: Progressing     Problem: METABOLIC/FLUID AND ELECTROLYTES - ADULT  Goal: Electrolytes maintained within normal limits  Description: INTERVENTIONS:  - Monitor labs and rhythm and assess patient for signs and symptoms of electrolyte imbalances  - Administer electrolyte replacement as ordered  - Monitor response to electrolyte replacements, including rhythm and repeat lab results as appropriate  - Fluid restriction as ordered  - Instruct patient on fluid and nutrition restrictions as appropriate  Outcome: Progressing     Problem: SKIN/TISSUE INTEGRITY - ADULT  Goal: Skin integrity remains intact  Description: INTERVENTIONS  - Assess and document risk factors for pressure ulcer development  - Assess and document skin integrity  - Monitor for areas of redness and/or skin breakdown  - Initiate interventions, skin care algorithm/standards of care as needed  Outcome: Progressing  Goal: Incision(s), wounds(s) or drain site(s) healing without S/S of infection  Description: INTERVENTIONS:  - Assess and document risk factors for pressure ulcer development  - Assess and document skin integrity  - Assess and document dressing/incision, wound bed, drain sites and surrounding tissue  - Implement wound care per orders  - Initiate isolation precautions as appropriate  - Initiate Pressure Ulcer prevention bundle as indicated  Outcome: Progressing

## 2024-05-09 NOTE — PROGRESS NOTES
Progress Note  Lina Dudley Patient Status:  Inpatient    1926 MRN [id number]   Location [facility] 3W/SW Attending Jemima Chaudhry MD   Hosp Day # 3 PCP ROBYN KULKARNI MD     Subjective:  Pt denies any complaints     Objective:  BP 93/73 (BP Location: Left arm)   Pulse 87   Temp 97.2 °F (36.2 °C) (Axillary)   Resp 16   Wt 113 lb 3.2 oz (51.3 kg)   SpO2 96%   BMI 18.84 kg/m²     Telemetry: NSR HR 80      Intake/Output:    Intake/Output Summary (Last 24 hours) at 2024 1301  Last data filed at 2024 1100  Gross per 24 hour   Intake 300 ml   Output 150 ml   Net 150 ml       Last 3 Weights   24 0540 113 lb 3.2 oz (51.3 kg)   24 0500 120 lb 14.4 oz (54.8 kg)   24 2351 123 lb 6.4 oz (56 kg)   24 2200 112 lb 7 oz (51 kg)   24 0444 113 lb 8 oz (51.5 kg)   24 0539 112 lb 14.4 oz (51.2 kg)   24 0320 112 lb 11.2 oz (51.1 kg)   24 1518 112 lb 11.2 oz (51.1 kg)   24 0958 120 lb (54.4 kg)   23 1750 105 lb (47.6 kg)       Labs:  Recent Labs   Lab 24  0117 24  0623 24  1948 24  0724 24  0606   GLU 85  --   --  94 113*   BUN 59*  --   --  74* 65*   CREATSERUM 1.69*  --   --  1.50* 1.29*   EGFRCR 27*  --   --  31* 38*   CA 8.5*  --   --  8.1* 8.7   *  --   --  130* 132*   K 6.0*   < > 5.1 4.3 4.4     --   --  97* 99   CO2 17.0*  --   --  24.0 26.0    < > = values in this interval not displayed.     Recent Labs   Lab 24  0117 24  1536 24  0606   RBC 4.68 4.51 4.66 4.44   HGB 15.1 14.6 15.0 14.6   HCT 45.0 43.7 45.4 43.7   MCV 96.2 96.9 97.4 98.4   MCH 32.3 32.4 32.2 32.9   MCHC 33.6 33.4 33.0 33.4   RDW 16.8* 16.3* 16.4* 16.3*   NEPRELIM 13.16*  --  10.24* 8.53*   WBC 15.7* 16.8* 12.1* 10.5   .0 209.0 179.0 157.0         Recent Labs   Lab 24  0623   TROPHS 58* 52*   CK  --  65     No results found for: \"PT\", \"INR\"    Diagnostics:        Review of Systems   Respiratory: Negative.     Cardiovascular: Negative.        Physical Exam:    Physical Exam  Vitals reviewed.   Constitutional:       General: She is not in acute distress.  Neck:      Vascular: No JVD.   Cardiovascular:      Rate and Rhythm: Normal rate. Rhythm irregularly irregular.      Pulses:           Radial pulses are 2+ on the right side and 2+ on the left side.        Dorsalis pedis pulses are 2+ on the right side and 2+ on the left side.      Heart sounds: S1 normal and S2 normal. Murmur heard.      Systolic murmur is present with a grade of 2/6.   Pulmonary:      Effort: No respiratory distress.      Breath sounds: No stridor. No wheezing, rhonchi or rales.   Abdominal:      General: Abdomen is flat.      Palpations: Abdomen is soft.   Musculoskeletal:      Cervical back: Normal range of motion.   Skin:     General: Skin is warm and dry.   Neurological:      General: No focal deficit present.      Mental Status: She is alert.   Psychiatric:         Mood and Affect: Mood normal.         Medications:   metoprolol tartrate  12.5 mg Oral 2x Daily(Beta Blocker)    heparin  5,000 Units Subcutaneous 2 times per day         Assessment/Plan:    Chronic respiratory failure secondary to COPD  - on 2L NC now chronically on 1L at home per pt  - chest xray: stable small right pleural effusion  - inhalers per PCP  - euvolemic on exam  - [id number]  - echo from 5/2 with LVEF 60-65, no RWMA, moderate MR, Severe TR, PASP 69mmHg  -pulmonary following     New onset afib  - noted to be in rate controlled afib, intermittently, mostly provoked by respiratory failure  - on metoprolol 12.5 BID  - [id number] score of 4  - with her frequent fall episodes high risk for bleeding      Pulmonary hypertension     Plan:  - Continue current medication  - Euvolemic on exam, small pleural effusion on xray, low BP,   - further management per primary       Plan discussed with pt and RN    Rancho Garcia,  APRN  [facility]  5/9/2024  1:01 PM    Addendum:  Patient seen and examined  Agree with the assessment   Management plan as outlined above    Will sign off  Please call with any questions and concerns    Van Nix MD  L3

## 2024-05-09 NOTE — CM/SW NOTE
05/09/24 1200   Discharge disposition   Expected discharge disposition subacute   Post Acute Care Provider   (chanel holcomb)   Discharge transportation [facility]     Per RN pt is cleared for dc  Bed avail at facility    Plan  [facility] 3pm  [facility], 02  Pcs done  RN report [phone]    / to remain available for support and/or discharge planning.     Cary Leslie RN    Ext [phone]

## 2024-05-10 ENCOUNTER — INITIAL APN SNF VISIT (OUTPATIENT)
Dept: INTERNAL MEDICINE CLINIC | Facility: SKILLED NURSING FACILITY | Age: 89
End: 2024-05-10

## 2024-05-10 ENCOUNTER — EXTERNAL FACILITY (OUTPATIENT)
Dept: INTERNAL MEDICINE CLINIC | Facility: CLINIC | Age: 89
End: 2024-05-10

## 2024-05-10 DIAGNOSIS — I50.20 SYSTOLIC CONGESTIVE HEART FAILURE, UNSPECIFIED HF CHRONICITY (HCC): ICD-10-CM

## 2024-05-10 DIAGNOSIS — R35.0 URINARY FREQUENCY: ICD-10-CM

## 2024-05-10 DIAGNOSIS — J84.9 ILD (INTERSTITIAL LUNG DISEASE) (HCC): Primary | ICD-10-CM

## 2024-05-10 DIAGNOSIS — I10 PRIMARY HYPERTENSION: ICD-10-CM

## 2024-05-10 DIAGNOSIS — R79.89 ELEVATED TROPONIN I LEVEL: ICD-10-CM

## 2024-05-10 DIAGNOSIS — I50.9 ACUTE ON CHRONIC CONGESTIVE HEART FAILURE, UNSPECIFIED HEART FAILURE TYPE (HCC): ICD-10-CM

## 2024-05-10 DIAGNOSIS — J96.21 ACUTE ON CHRONIC RESPIRATORY FAILURE WITH HYPOXIA (HCC): Primary | ICD-10-CM

## 2024-05-10 NOTE — PROGRESS NOTES
HPI  89-year-old  female who lives by herself with underlying COPD and pulmonary artery hypertension, was found by her daughter lying on the floor in her house . Patient reported that she fell last night. Did not hit her head, but she could not get up because her legs were giving out. She decided to lie on the floor until she was found by her daughter.  Patient had a chest x-ray which showed emphysema with chronic prominent pulmonary interstitium, slight progression of diffuse interstitial prominence may reflect progression of pulmonary interstitial fibrosis versus atypical viral pneumonia. CT scan of the cervical spine and CT scan of the brain, besides osteoarthritis, no acute findings. Patient stabilized and sent to [facility] for rehab.  On 05/06/24, patient O2 Saturations started to decline again. She was still on 4L and was 84%. Chest xray shows more infiltrated and pleural effusion. She was sent to the hospital for evaluation. She was treated for Acute on chronic CHF and respiratory failure. She was stabilized and sent back to [facility] .      Past Medical History:   Appendicitis   APPENDECTOMY   Arthritis   Basal cell carcinoma   Of Face   Chicken pox   COPD (chronic obstructive pulmonary disease) (HCC)   Left arm numbness   Measles   Other and unspecified hyperlipidemia   Pneumonia   Tonsillitis   Unspecified essential hypertension    Past Surgical History:  Procedure Laterality Date   Appendectomy   Cataract extraction Bilateral   Dr. Tirado's office   Glaucoma surgery   Hip replacement surgery   Skin surgery Excision of Basal cell carcinoma of face   Tonsillectomy   T&A    Family History  Problem Relation Age of Onset   Cancer Father   Hypertension Father   Heart Disease Father   Heart Disease Mother   Gastro-Intestinal Disorder Maternal Grandmother   TUBERCULOSIS   Breast Cancer Maternal Aunt   80s   Neurological Disorder Other   GREAT AUNT, EPILEPSY   Lipids Other   HYPERLIPIDEMIA    Glaucoma Neg   Diabetes Neg    Social History    Socioeconomic History   Marital status: Single  Tobacco Use   Smoking status: Former   Current packs/day: 0.00   Average packs/day: 1 pack/day for 30.0 years (30.0 ttl pk-yrs)   Types: Cigarettes   Start date: 1960   Quit date: 1990   Years since quittin.3   Smokeless tobacco: Former  Vaping Use   Vaping status: Never Used  Substance and Sexual Activity   Alcohol use: No   Alcohol/week: 2.0 standard drinks of alcohol   Types: 2 Glasses of wine per week   Comment: rare   Drug use: No  Other Topics Concern   Caffeine Concern Yes   Comment: COFFEE 3 CUPS/DAY          Allergies  Allergen Reactions   Bacitracin UNKNOWN   Neosporin Original [Neomycin-Bacitracin-Polymyxin] ITCHING   Polymyxin B UNKNOWN        CURRENT MEDICATIONS - reviewed     ROS she is feeling tired and weak, her breathing is ok     PHYSICAL EXAM:  GENERAL HEALTH:fatigued, ill appearing,  LINES, TUBES, DRAINS: none  SKIN: no rashes, no suspicious lesions, warm  WOUND: see wound assessment,  EYES: PERRLA, EOMI, sclera anicteric, conjunctiva normal; there is no nystagmus, no drainage from eyes  HENT: normocephalic; normal nose, no nasal drainage, mucous membranes pink, moist, pharynx no exudate, no visible cerumen.  NECK:supple, FROM; no JVD, no TMG, no carotid bruits  BREAST: deferred exam  RESPIRATORY:clear to percussion and auscultation  CARDIOVASCULAR: S1, S2 normal, RRR; no S3, no S4  ABDOMEN: normal active BS+, soft, nondistended; no organomegaly, no masses; no bruits; nontender, no guarding, no rebound tenderness.  :Deferred  LYMPHATIC:no lymphedema  MUSCULOSKELETAL: no acute synovitis upper or lower extremity  EXTREMITIES/VASCULAR:no cyanosis, clubbing or edema  NEUROLOGIC:intact; no sensorimotor deficit and follows commands  PSYCHIATRIC: alert and oriented 1-2, fatigued    a/p    Acute on chronic respiratory failure  due to chf + ild  - baseline is 2L, wean as tolerated  - continue  Ipratropium-Albuterol Q6PRN  - continue Albuterol Sulfate HFA Q4PRN  pulm to follow  in rehab    Weakness  UTI  -Klebsiella - s/p treatment        QUITA  monitor bmp          acute HFpEF  - BNP elevated at 2779, normalized now  - repeat echo--> showed normal EF of 60 to 65%, shows severe tricuspid regurgitation pulmonary hypertension with 60 mmHg  continue meds, cardio to see in house     HLD  - continue atorvastatin 10mg HS  ptot

## 2024-05-10 NOTE — PROGRESS NOTES
Lina Dudley  : 1926  Age 89 year old  female patient is admitted to [facility] for rehabilitation and strengthening.      [facility] Admission: 24 - 24  Re-Admission: 24 - 24     Chief complaint: Fall, acute on chronic respiratory failure with hypoxia, heart failure, QUITA, UTI     HPI  89-year-old  female who lives by herself with underlying COPD and pulmonary artery hypertension, was found by her daughter lying on the floor in her house today. Patient reported that she fell last night. Did not hit her head, but she could not get up because her legs were giving out. She decided to lie on the floor until she was found by her daughter this morning. In the emergency room, urinalysis showed evidence of urinary tract infection. CBC showed white blood cell count of 12.2 with left shift. Chemistry showed GFR 37, which is below her baseline. CK was 175. B-natriuretic peptide 2779. Patient had a chest x-ray which showed emphysema with chronic prominent pulmonary interstitium, slight progression of diffuse interstitial prominence may reflect progression of pulmonary interstitial fibrosis versus atypical viral pneumonia. CT scan of the cervical spine and CT scan of the brain, besides osteoarthritis, no acute findings. Patient stabilized and sent to [facility] for rehab.  On 24, patient O2 Saturations started to decline again. She was still on 4L and was 84%. Chest xray shows more infiltrated and pleural effusion. She was sent to the hospital for evaluation. She was treated for Acute on chronic CHF and respiratory failure. She was stabilized and sent back to [facility] for rehabilitation.     Patient seen as initial aprn visit, she is resting in bed. Opens eyes to voice. She is increasingly more lethargic and exhausted then a week ago. From hospital reports this may be a new baseline. She tells me she is \"fine\" but \"exhausted\" and goes back to sleep. She is  unable to provide more information but does allow me to examine her      Past Medical History:    Appendicitis    APPENDECTOMY    Arthritis    Basal cell carcinoma    Of Face     Chicken pox    COPD (chronic obstructive pulmonary disease) (HCC)    Left arm numbness    Measles    Other and unspecified hyperlipidemia    Pneumonia    Tonsillitis    Unspecified essential hypertension     Past Surgical History:   Procedure Laterality Date    Appendectomy      Cataract extraction Bilateral     Dr. Tirado's office    Glaucoma surgery      Hip replacement surgery      Skin surgery  Excision of Basal cell carcinoma of face     Tonsillectomy      T&A     Family History   Problem Relation Age of Onset    Cancer Father     Hypertension Father     Heart Disease Father     Heart Disease Mother     Gastro-Intestinal Disorder Maternal Grandmother         TUBERCULOSIS    Breast Cancer Maternal Aunt         80s    Neurological Disorder Other         GREAT AUNT, EPILEPSY    Lipids Other         HYPERLIPIDEMIA    Glaucoma Neg     Diabetes Neg      Social History     Socioeconomic History    Marital status: Single   Tobacco Use    Smoking status: Former     Current packs/day: 0.00     Average packs/day: 1 pack/day for 30.0 years (30.0 ttl pk-yrs)     Types: Cigarettes     Start date: 1960     Quit date: 1990     Years since quittin.3    Smokeless tobacco: Former   Vaping Use    Vaping status: Never Used   Substance and Sexual Activity    Alcohol use: No     Alcohol/week: 2.0 standard drinks of alcohol     Types: 2 Glasses of wine per week     Comment: rare    Drug use: No   Other Topics Concern    Caffeine Concern Yes     Comment: COFFEE 3 CUPS/DAY     Social Determinants of Health     Food Insecurity: No Food Insecurity (2024)    Food Insecurity     Food Insecurity: Never true   Transportation Needs: No Transportation Needs (2024)    Transportation Needs     Lack of Transportation: No   Housing Stability: Low Risk   (5/6/2024)    Housing Stability     Housing Instability: No       IMMUNIZATIONS  Immunization History   Administered Date(s) Administered    Covid-19 Vaccine Moderna 100 mcg/0.5 ml 02/28/2021, 03/29/2021, 11/02/2021, 04/12/2022    FLUAD High Dose 65 yr and older (89475) 10/14/2020    Fluvirin, 3 Years & >, Im 10/17/2015    HIGH DOSE FLU 65 YRS AND OLDER PRSV FREE SINGLE D (23942) FLU CLINIC 09/01/2017, 10/01/2021    Influenza 10/19/2004, 10/27/2005    Influenza Virus Vaccine, H1N1 12/23/2009    Pneumococcal (Prevnar 13) 07/10/2017    Pneumovax 23 10/23/2004        ALLERGIES:  Allergies   Allergen Reactions    Bacitracin UNKNOWN    Neosporin Original [Neomycin-Bacitracin-Polymyxin] ITCHING    Polymyxin B UNKNOWN       CODE STATUS:  DNR    ADVANCED CARE PLANNING TEAM: Will need family care plan       CURRENT MEDICATIONS - reviewed and updated on SNF [facility]       SUBJECTIVE    Patient seen as initial aprn visit, she is resting in bed. Opens eyes to voice. She is increasingly more lethargic and exhausted then a week ago. From hospital reports this may be a new baseline. She tells me she is \"fine\" but \"exhausted\" and goes back to sleep. She is unable to provide more information but does allow me to examine her    PHYSICAL EXAM:  GENERAL HEALTH:fatigued, ill appearing,   LINES, TUBES, DRAINS:  none  SKIN: no rashes, no suspicious lesions, warm  WOUND: see wound assessment,   EYES: PERRLA, EOMI, sclera anicteric, conjunctiva normal; there is no nystagmus, no drainage from eyes  HENT: normocephalic; normal nose, no nasal drainage, mucous membranes pink, moist, pharynx no exudate, no visible cerumen.   NECK:supple, FROM; no JVD, no TMG, no carotid bruits   BREAST: deferred exam   RESPIRATORY:clear to percussion and auscultation  CARDIOVASCULAR: S1, S2 normal, RRR; no S3, no S4  ABDOMEN:  normal active BS+, soft, nondistended; no organomegaly, no masses; no bruits; nontender, no guarding, no rebound  tenderness.  :Deferred  LYMPHATIC:no lymphedema  MUSCULOSKELETAL: no acute synovitis upper or lower extremity   EXTREMITIES/VASCULAR:no cyanosis, clubbing or edema  NEUROLOGIC:intact; no sensorimotor deficit and follows commands  PSYCHIATRIC: alert and oriented 1-2, fatigued    SEE PLAN BELOW  Acute on chronic CHF  Respiratory failure  ILD  -  baseline is 2L, wean as tolerated  - continue Ipratropium-Albuterol Q6PRN   - continue Albuterol Sulfate HFA Q4PRN  - Pulmonary to follow in rehab  - monitor     Weakness   UTI  - follow-up cultures - klebsiella  - continue Cefadroxil 500mg BID, completed 05/010/24  - continue tylenol 650mg Q6PRN  - PT/OT  - monitor      QUITA  - creatinine baseline ~0.6  - not on fluids with elevated bnp and fluid overload  - cautious diuresis  - CBC and BMP weekle and PRN  - monitor      ILD  - baseline is 2L    - repeat chest Xray on 05/06, pending  - continue Ipratropium-Albuterol Q6PRN   - continue Albuterol Sulfate HFA Q4PRN  - Pulmonary to follow in rehab  - monitor      Ch. Elevated Troponin  Fluid overload  acute HFpEF  - BNP elevated at 2779, normalized now  - repeat echo--> showed normal EF of 60 to 65%, shows severe tricuspid regurgitation pulmonary hypertension with 60 mmHg  - cardiology to follow in rehab  - monitor      HLD  - continue atorvastatin 10mg HS  - monitor     FOLLOW UP APPOINTMENTS  Future Appointments   Date Time Provider Department Center   10/28/2024  3:15 PM Placido Hutchins MD [facility]        This is a 60 minute visit and greater than 50% of the time was spent counseling the patient and/or coordinating care. Patient is a DNAR    Note to patient: The 21st Century Cures Act makes medical notes like these available to patients in the interest of transparency. However, this is a medical document intended as peer to peer communication. It is written in medical language and may contain abbreviations or verbiage that are unfamiliar. It may appear blunt or  direct. Medical documents are intended to carry relevant information, facts as evident, and the clinical opinion of the practitioner who signs the document.     Kenya Saucedo, TAISHA  05/10/24

## 2024-05-11 PROBLEM — J96.10 CHRONIC RESPIRATORY FAILURE, UNSPECIFIED WHETHER WITH HYPOXIA OR HYPERCAPNIA (HCC): Status: ACTIVE | Noted: 2024-01-01

## 2024-05-11 PROBLEM — J96.10 CHRONIC RESPIRATORY FAILURE, UNSPECIFIED WHETHER WITH HYPOXIA OR HYPERCAPNIA (HCC): Status: ACTIVE | Noted: 2024-05-11

## 2024-05-11 PROBLEM — J44.1 COPD EXACERBATION (HCC): Status: ACTIVE | Noted: 2024-01-01

## 2024-05-11 PROBLEM — J44.1 COPD EXACERBATION (HCC): Status: ACTIVE | Noted: 2024-05-11

## 2024-05-11 NOTE — ED INITIAL ASSESSMENT (HPI)
Patient arrived via EMS from [facility] for evaluation of chest pain. Patient is A&Ox2-3 at baseline. On 2L O2 at home.

## 2024-05-12 NOTE — CONSULTS
[facility]  part of [facility]    Report of Consultation    Lina Dudley Patient Status:  Observation    1926 MRN [id number]   Location [facility] 3W/SW Attending Mayi Gaxiola MD   Hosp Day # 0 PCP ROBYN KULKARNI MD     Date of Admission:  2024  Date of Consult:  2024   Reason for Consultation:   Quita/CKD    History of Present Illness:   Patient is a 89 year old female who was admitted to the hospital for Chronic respiratory failure, unspecified whether with hypoxia or hypercapnia (HCC):    89 y/o F with h/o COPD on home o2 2L , pulm HTN, NH resident 3 rd admission in last mth presented to ER with chest tightness and sob. IN ER CR 1.3 MG/DL    Patient was hospitalized -24 for faLL / UTI/ klebsiella , QUITA   Readmitted -  again with Quita / hyperkalemia/ chf exacerbation discharged without diuretics    We are consulted for quita/  vol overload    Noted lab from before cr 0.8 mg/dl b/l     Past Medical History  Past Medical History:    Appendicitis    APPENDECTOMY    Arthritis    Basal cell carcinoma    Of Face     Chicken pox    COPD (chronic obstructive pulmonary disease) (HCC)    Left arm numbness    Measles    Other and unspecified hyperlipidemia    Pneumonia    Tonsillitis    Unspecified essential hypertension       Past Surgical History  Past Surgical History:   Procedure Laterality Date    Appendectomy      Cataract extraction Bilateral     Dr. Tirado's office    Glaucoma surgery      Hip replacement surgery      Skin surgery  Excision of Basal cell carcinoma of face     Tonsillectomy      T&A       Family History  Family History   Problem Relation Age of Onset    Cancer Father     Hypertension Father     Heart Disease Father     Heart Disease Mother     Gastro-Intestinal Disorder Maternal Grandmother         TUBERCULOSIS    Breast Cancer Maternal Aunt         80s    Neurological Disorder Other         GREAT AUNT, EPILEPSY    Lipids Other          HYPERLIPIDEMIA    Glaucoma Neg     Diabetes Neg        Social History  Social History     Socioeconomic History    Marital status: Single   Tobacco Use    Smoking status: Former     Current packs/day: 0.00     Average packs/day: 1 pack/day for 30.0 years (30.0 ttl pk-yrs)     Types: Cigarettes     Start date: 1960     Quit date: 1990     Years since quittin.3    Smokeless tobacco: Former   Vaping Use    Vaping status: Never Used   Substance and Sexual Activity    Alcohol use: No     Alcohol/week: 2.0 standard drinks of alcohol     Types: 2 Glasses of wine per week    Drug use: No   Other Topics Concern    Caffeine Concern Yes     Comment: COFFEE 3 CUPS/DAY     Social Determinants of Health     Food Insecurity: Unknown (2024)    Food Insecurity     Food Insecurity: Patient unable to answer   Transportation Needs: Unknown (2024)    Transportation Needs     Lack of Transportation: Patient unable to answer   Housing Stability: Unknown (2024)    Housing Stability     Housing Instability: Patient unable to answer       Current Medications:  Current Facility-Administered Medications   Medication Dose Route Frequency    acetaminophen (Tylenol Extra Strength) tab 500 mg  500 mg Oral Q4H PRN    atorvastatin (Lipitor) tab 10 mg  10 mg Oral Nightly    docusate sodium (Colace) cap 100 mg  100 mg Oral BID    fluticasone furoate-vilanterol (Breo Ellipta) 100-25 MCG/ACT inhaler 1 puff  1 puff Inhalation QAM    ipratropium-albuterol (Duoneb) 0.5-2.5 (3) MG/3ML inhalation solution 3 mL  3 mL Nebulization Q6H PRN    metoprolol tartrate (Lopressor) partial tab 12.5 mg  12.5 mg Oral 2x Daily(Beta Blocker)    polyethylene glycol (PEG 3350) (Miralax) 17 g oral packet 17 g  17 g Oral Daily PRN    heparin (Porcine) 5000 UNIT/ML injection 5,000 Units  5,000 Units Subcutaneous Q8H GIANCARLO    acetaminophen (Tylenol Extra Strength) tab 500 mg  500 mg Oral Q4H PRN    melatonin tab 3 mg  3 mg Oral Nightly PRN     albuterol (Ventolin) (2.5 MG/3ML) 0.083% nebulizer solution 2.5 mg  2.5 mg Nebulization Q4H PRN    predniSONE (Deltasone) tab 40 mg  40 mg Oral Daily    azithromycin (Zithromax) tab 250 mg  250 mg Oral Nightly    guaiFENesin (Robitussin) 100 MG/5 ML oral liquid 200 mg  200 mg Oral Q4H PRN     Medications Prior to Admission   Medication Sig    metoprolol tartrate 25 MG Oral Tab Take 0.5 tablets (12.5 mg total) by mouth 2x Daily(Beta Blocker).    ipratropium-albuterol 0.5-2.5 (3) MG/3ML Inhalation Solution Take 3 mL by nebulization every 6 (six) hours as needed.    polyethylene glycol, PEG 3350, 17 g Oral Powd Pack Take 17 g by mouth daily as needed.    docusate sodium 100 MG Oral Cap Take 100 mg by mouth 2 (two) times daily.    albuterol 108 (90 Base) MCG/ACT Inhalation Aero Soln inhale 2 puff by inhalation route  every 4 - 6 hours as needed    atorvastatin 10 MG Oral Tab Take 1 tablet (10 mg total) by mouth nightly.    fluticasone furoate-vilanterol (BREO ELLIPTA) 100-25 MCG/ACT Inhalation Aerosol Powder, Breath Activated Inhale 1 puff into the lungs every morning. To follow with gargle, rinse, & spit after using BREO to help reduce your chance of getting thrush.    acetaminophen 500 MG Oral Tab Take 1 tablet (500 mg total) by mouth every 4 (four) hours as needed for Fever (equal to or greater than 100.4).       Allergies  Allergies   Allergen Reactions    Bacitracin UNKNOWN    Neosporin Original [Neomycin-Bacitracin-Polymyxin] ITCHING    Polymyxin B UNKNOWN       Review of Systems:   GENERAL HEALTH: feels well otherwise  SKIN: denies any unusual skin lesions or rashes  RESPIRATORY:+ sob  CARDIOVASCULAR: denies chest pain on exertion, no palpitations  :  Denies hematuria, dysuria, foamy urine  GI: denies abdominal pain and denies heartburn, no nausea/vomiting/diarrhea  EXT: + edema  NEURO: denies headaches      A comprehensive 12 point review of systems was completed.  Pertinent positives as above and all the rest  were negative.     Physical Exam:   BP 96/68 (BP Location: Right arm)   Pulse 88   Temp 97.2 °F (36.2 °C) (Oral)   Resp 16   Ht 5' 7\" (1.702 m)   Wt 121 lb 6.4 oz (55.1 kg)   SpO2 92%   BMI 19.01 kg/m²      Intake/Output Summary (Last 24 hours) at 5/12/2024 1352  Last data filed at 5/12/2024 0855  Gross per 24 hour   Intake 350 ml   Output 470 ml   Net -120 ml     Wt Readings from Last 1 Encounters:   05/11/24 121 lb 6.4 oz (55.1 kg)       Exam  GENERAL: frail lady , sitting up   SKIN: no rashes  HEENT: no scleral icterus, moist mucus membranes  NECK: supple,no adenopathy,no bruits  LUNGS: dec BS  CARDIO: RRR without murmur  GI: good BS's,no masses, HSM or tenderness, soft, non-distended, no audible bruits  EXTREMITIES: + edema  NEURO: CN grossly intact, A+O x3  Access      Results:     Laboratory Data:  Recent Labs   Lab 05/08/24  1536 05/09/24  0606 05/11/24  1836   RBC 4.66 4.44 4.45   HGB 15.0 14.6 14.3   HCT 45.4 43.7 43.7   MCV 97.4 98.4 98.2   MCH 32.2 32.9 32.1   MCHC 33.0 33.4 32.7   RDW 16.4* 16.3* 16.3*   NEPRELIM 10.24* 8.53* 10.35*   WBC 12.1* 10.5 13.2*   .0 157.0 171.0         Recent Labs   Lab 05/08/24  0724 05/09/24  0606 05/11/24  1836   GLU 94 113* 118*   BUN 74* 65* 50*   CREATSERUM 1.50* 1.29* 1.32*   CA 8.1* 8.7 8.4*   * 132* 130*   K 4.3 4.4 5.2*   CL 97* 99 99   CO2 24.0 26.0 25.0        Imaging:  XR CHEST AP PORTABLE  (CPT=71045)    Result Date: 5/11/2024  CONCLUSION:  1. CHF/fluid overload superimposed on emphysema.  2. Slight loculation of small bibasilar pleural effusions versus small pleural effusions with pleural scarring.  No significant change.    Dictated by (CST): Hayes Bauer MD on 5/11/2024 at 7:06 PM     Finalized by (CST): Hayes Bauer MD on 5/11/2024 at 7:09 PM               Impression/Plan:     Impression:  QUITA : likely cardiorenal. Will check UA and urine sodium,. Check renal US r/o retention  Hyperkalemia , will recheck   Hyponatremia , prob poor  solute intake vs siadh . Check urine osm and sodium  Hypoxic resp failure. > fluid + possible copd exacerbation/pna. Gentle diuresis   Hypotension, not on any antihypertensives currently . Will use midodrine to help with bp   Copd . Inhalers/ steroids      Plan:  Renal us, ua and urine lytes  Lasix   midodrine      Thank you very much for allowing me to participate in the care of your patient.  If you have any questions, please do not hesitate to contact me.     Melida Gomez MD  5/12/2024

## 2024-05-12 NOTE — PLAN OF CARE
Pt. A/Ox2, on 2L, VSS. Admission completed, Travis accompanied pt to room from ED. No acute events. Pt educated on call light use, within reach. Safety measures in place.     Problem: Patient Centered Care  Goal: Patient preferences are identified and integrated in the patient's plan of care  Description: Interventions:  - What would you like us to know as we care for you? From [facility]  - Provide timely, complete, and accurate information to patient/family  - Incorporate patient and family knowledge, values, beliefs, and cultural backgrounds into the planning and delivery of care  - Encourage patient/family to participate in care and decision-making at the level they choose  - Honor patient and family perspectives and choices  Outcome: Progressing    Problem: CARDIOVASCULAR - ADULT  Goal: Maintains optimal cardiac output and hemodynamic stability  Description: INTERVENTIONS:  - Monitor vital signs, rhythm, and trends  - Monitor for bleeding, hypotension and signs of decreased cardiac output  - Evaluate effectiveness of vasoactive medications to optimize hemodynamic stability  - Monitor arterial and/or venous puncture sites for bleeding and/or hematoma  - Assess quality of pulses, skin color and temperature  - Assess for signs of decreased coronary artery perfusion - ex. Angina  - Evaluate fluid balance, assess for edema, trend weights  Outcome: Progressing  Goal: Absence of cardiac arrhythmias or at baseline  Description: INTERVENTIONS:  - Continuous cardiac monitoring, monitor vital signs, obtain 12 lead EKG if indicated  - Evaluate effectiveness of antiarrhythmic and heart rate control medications as ordered  - Initiate emergency measures for life threatening arrhythmias  - Monitor electrolytes and administer replacement therapy as ordered  Outcome: Progressing     Problem: RESPIRATORY - ADULT  Goal: Achieves optimal ventilation and oxygenation  Description: INTERVENTIONS:  - Assess for changes in  respiratory status  - Assess for changes in mentation and behavior  - Position to facilitate oxygenation and minimize respiratory effort  - Oxygen supplementation based on oxygen saturation or ABGs  - Provide Smoking Cessation handout, if applicable  - Encourage broncho-pulmonary hygiene including cough, deep breathe, Incentive Spirometry  - Assess the need for suctioning and perform as needed  - Assess and instruct to report SOB or any respiratory difficulty  - Respiratory Therapy support as indicated  - Manage/alleviate anxiety  - Monitor for signs/symptoms of CO2 retention  Outcome: Progressing     Problem: GENITOURINARY - ADULT  Goal: Absence of urinary retention  Description: INTERVENTIONS:  - Assess patient’s ability to void and empty bladder  - Monitor intake/output and perform bladder scan as needed  - Follow urinary retention protocol/standard of care  - Consider collaborating with pharmacy to review patient's medication profile  - Implement strategies to promote bladder emptying  Outcome: Progressing     Problem: METABOLIC/FLUID AND ELECTROLYTES - ADULT  Goal: Glucose maintained within prescribed range  Description: INTERVENTIONS:  - Monitor Blood Glucose as ordered  - Assess for signs and symptoms of hyperglycemia and hypoglycemia  - Administer ordered medications to maintain glucose within target range  - Assess barriers to adequate nutritional intake and initiate nutrition consult as needed  - Instruct patient on self management of diabetes  Outcome: Progressing  Goal: Electrolytes maintained within normal limits  Description: INTERVENTIONS:  - Monitor labs and rhythm and assess patient for signs and symptoms of electrolyte imbalances  - Administer electrolyte replacement as ordered  - Monitor response to electrolyte replacements, including rhythm and repeat lab results as appropriate  - Fluid restriction as ordered  - Instruct patient on fluid and nutrition restrictions as appropriate  Outcome:  Progressing     Problem: SKIN/TISSUE INTEGRITY - ADULT  Goal: Skin integrity remains intact  Description: INTERVENTIONS  - Assess and document risk factors for pressure ulcer development  - Assess and document skin integrity  - Monitor for areas of redness and/or skin breakdown  - Initiate interventions, skin care algorithm/standards of care as needed  Outcome: Progressing     Problem: HEMATOLOGIC - ADULT  Goal: Maintains hematologic stability  Description: INTERVENTIONS  - Assess for signs and symptoms of bleeding or hemorrhage  - Monitor labs and vital signs for trends  - Administer supportive blood products/factors, fluids and medications as ordered and appropriate  - Administer supportive blood products/factors as ordered and appropriate  Outcome: Progressing     Problem: MUSCULOSKELETAL - ADULT  Goal: Return mobility to safest level of function  Description: INTERVENTIONS:  - Assess patient stability and activity tolerance for standing, transferring and ambulating w/ or w/o assistive devices  - Assist with transfers and ambulation using safe patient handling equipment as needed  - Ensure adequate protection for wounds/incisions during mobilization  - Obtain PT/OT consults as needed  - Advance activity as appropriate  - Communicate ordered activity level and limitations with patient/family  Outcome: Progressing

## 2024-05-12 NOTE — ED PROVIDER NOTES
Patient Seen in: [facility] Emergency Department      History     Chief Complaint   Patient presents with    Chest Pain Angina     Stated Complaint:     Subjective:   HPI    89-year-old female here recently with decompensated heart failure on chronic O2 therapy who started complaining of some chest tightness and shortness of breath today.  No report of any fever.  Patient unsure if she is on antibiotics.  She denies abdominal pain.    Objective:   Past Medical History:    Appendicitis    APPENDECTOMY    Arthritis    Basal cell carcinoma    Of Face     Chicken pox    COPD (chronic obstructive pulmonary disease) (HCC)    Left arm numbness    Measles    Other and unspecified hyperlipidemia    Pneumonia    Tonsillitis    Unspecified essential hypertension              Past Surgical History:   Procedure Laterality Date    Appendectomy      Cataract extraction Bilateral     Dr. Tirado's office    Glaucoma surgery      Hip replacement surgery      Skin surgery  Excision of Basal cell carcinoma of face     Tonsillectomy      T&A                Social History     Socioeconomic History    Marital status: Single   Tobacco Use    Smoking status: Former     Current packs/day: 0.00     Average packs/day: 1 pack/day for 30.0 years (30.0 ttl pk-yrs)     Types: Cigarettes     Start date: 1960     Quit date: 1990     Years since quittin.3    Smokeless tobacco: Former   Vaping Use    Vaping status: Never Used   Substance and Sexual Activity    Alcohol use: No     Alcohol/week: 2.0 standard drinks of alcohol     Types: 2 Glasses of wine per week    Drug use: No   Other Topics Concern    Caffeine Concern Yes     Comment: COFFEE 3 CUPS/DAY     Social Determinants of Health     Food Insecurity: No Food Insecurity (2024)    Food Insecurity     Food Insecurity: Never true   Transportation Needs: No Transportation Needs (2024)    Transportation Needs     Lack of Transportation: No   Housing Stability: Low Risk   (5/6/2024)    Housing Stability     Housing Instability: No              Review of Systems    Positive for stated complaint:   Other systems are as noted in HPI.  Constitutional and vital signs reviewed.      All other systems reviewed and negative except as noted above.    Physical Exam     ED Triage Vitals   BP 05/11/24 1823 103/77   Pulse 05/11/24 1823 97   Resp 05/11/24 1823 18   Temp 05/11/24 1954 97.4 °F (36.3 °C)   Temp src 05/11/24 1954 Rectal   SpO2 05/11/24 1823 94 %   O2 Device 05/11/24 1823 Nasal cannula       Current Vitals:   Vital Signs  BP: 98/80  Pulse: 79  Resp: 16  Temp: 97.4 °F (36.3 °C)  Temp src: Rectal  MAP (mmHg): 86    Oxygen Therapy  SpO2: 99 %  O2 Device: None (Room air)  O2 Flow Rate (L/min): 2 L/min            Physical Exam    Constitutional: Oriented to person, place, and time.  Appears well-developed. No distress.   Head: Normocephalic and atraumatic.   Eyes: Conjunctivae are normal. Pupils are equal, round, and reactive to light.   Neck: Normal range of motion. Neck supple.  No JVD.  No stridor.  Cardiovascular: Normal rate, regular rhythm and intact and equal distal pulses.    Pulmonary/Chest: Moderate tachypnea, mild distress, mildly diminished lung sounds more in the inferior fields with mild wheezing  Abdominal: Soft. There is no tenderness. There is no guarding.   Musculoskeletal: Normal range of motion. No edema or tenderness.   Neurological: Alert and oriented to person, place, and time.   Skin: Skin is warm and dry.   Psychiatric: Normal mood and affect.  Behavior is normal.   Nursing note and vitals reviewed.    Differential diagnosis includes acute on chronic respiratory failure, decompensated heart failure, COPD exacerbation, pneumonia and aspiration pneumonitis.      ED Course     Labs Reviewed   COMP METABOLIC PANEL (14) - Abnormal; Notable for the following components:       Result Value    Glucose 118 (*)     Sodium 130 (*)     Potassium 5.2 (*)     BUN 50 (*)      Creatinine 1.32 (*)     BUN/CREA Ratio 37.9 (*)     Calcium, Total 8.4 (*)     eGFR-Cr 37 (*)     AST 36 (*)     Alkaline Phosphatase 195 (*)     Bilirubin, Total 1.2 (*)     All other components within normal limits   BNP (B TYPE NATRIURETIC PEPTIDE) - Abnormal; Notable for the following components:    Beta Natriuretic Peptide 1,794 (*)     All other components within normal limits   PROCALCITONIN - Abnormal; Notable for the following components:    Procalcitonin 0.18 (*)     All other components within normal limits   CBC W/ DIFFERENTIAL - Abnormal; Notable for the following components:    WBC 13.2 (*)     RDW-SD 54.2 (*)     RDW 16.3 (*)     Immature Platelet Fraction 7.1 (*)     Neutrophil Absolute Prelim 10.35 (*)     Neutrophil Absolute 10.35 (*)     Monocyte Absolute 1.52 (*)     All other components within normal limits   TROPONIN I HIGH SENSITIVITY - Normal   CBC WITH DIFFERENTIAL WITH PLATELET    Narrative:     The following orders were created for panel order CBC With Differential With Platelet.  Procedure                               Abnormality         Status                     ---------                               -----------         ------                     CBC W/ DIFFERENTIAL[[id number]]          Abnormal            Final result                 Please view results for these tests on the individual orders.   SCAN SLIDE   RAINBOW DRAW BLUE     EKG    Rate, intervals and axes as noted on EKG Report.  Rate: 88  Rhythm: Sinus Rhythm  Reading: No gross acute ischemic                 XR CHEST AP PORTABLE  (CPT=71045)    Result Date: 5/11/2024  PROCEDURE: XR CHEST AP PORTABLE  (CPT=71045) TIME: 1845 hours  COMPARISON: [facility], CT SPINE CERVICAL (CPT=72125), 5/01/2024, 10:31 AM.  [facility], XR CHEST AP PORTABLE (CPT=71045), 5/08/2024, 1:58 PM.  INDICATIONS: Right sided chest pain today. On 2Liters of oxygen at home.  TECHNIQUE:   Single view.   FINDINGS:   CARDIAC/VASC: Cardiomegaly. Pulmonary venous congestion. MEDIAST/CARLOS:   No visible mass or adenopathy. LUNGS/PLEURA: Emphysema Small bibasilar partially loculated pleural effusions. BONES: No fracture or visible bony lesion. OTHER: Negative.          CONCLUSION:  1. CHF/fluid overload superimposed on emphysema.  2. Slight loculation of small bibasilar pleural effusions versus small pleural effusions with pleural scarring.  No significant change.    Dictated by (CST): Hayes Bauer MD on 5/11/2024 at 7:06 PM     Finalized by (CST): Hayes Bauer MD on 5/11/2024 at 7:09 PM          XR CHEST AP PORTABLE  (CPT=71045)    Result Date: 5/8/2024  PROCEDURE: XR CHEST AP PORTABLE  (CPT=71045) TIME: 1358 hours.   COMPARISON: [facility], XR CHEST AP PORTABLE (CPT=71045), 5/06/2024, 8:06 PM.  [facility], XR CHEST AP PORTABLE (CPT=71045), 5/01/2024, 10:51 AM.  [facility], XR CHEST AP PORTABLE (CPT=71045), 3/22/2023, 10:46 AM.  INDICATIONS: SOB, cough.  TECHNIQUE:   Single view.   FINDINGS:  CARDIAC/MEDIAST: Stable art and mediastinum.  No vascular congestion.  LUNGS/PLEURA:  Improved aeration in the left lung base with some minimal linear opacity which may reflect atelectasis.  Stable small right pleural effusion.  No pneumothorax. OTHER:  Osseous structures are unchanged.         CONCLUSION:   Improved aeration left lung base.  Stable small right pleural effusion.    Dictated by (CST): Roddy Santana MD on 5/08/2024 at 3:03 PM     Finalized by (CST): Roddy Santana MD on 5/08/2024 at 3:09 PM          XR ABDOMEN (1 VIEW) (CPT=74018)    Result Date: 5/7/2024  PROCEDURE: XR ABDOMEN (1 VIEW) (CPT=74018)  COMPARISON: None.  INDICATIONS: rule out constipation  TECHNIQUE:   Single view.   FINDINGS:  BOWEL GAS PATTERN: Small/physiologic volume of fecal material throughout the large bowel.  No abnormal dilation or deviation.  SOFT TISSUES: Small bilateral pleural effusions.   Curvilinear band of high density within the left upper quadrant compatible with renal excretion of IV contrast material. CALCIFICATIONS: None significant. BONES: Scattered mild degenerative endplate changes in the visualized thoracolumbar spine.  Right hip arthroplasty.  Mild to disease in the lower lumbar spine and left hip. OTHER: No abnormal gaseous collections.          CONCLUSION:  1.  Small bilateral pleural effusions. 2.  Nonspecific-nonobstructive bowel gas pattern. 3.  Post right hip arthroplasty.  Mild degenerative change in the left hip.  [facility] provided a preliminary report for this examination. This final report agrees with their preliminary findings.  Dictated by (CST): Joshua Joseph MD on 5/07/2024 at 12:11 PM     Finalized by (CST): Joshua Joseph MD on 5/07/2024 at 12:13 PM          XR CHEST AP PORTABLE  (CPT=71045)    Result Date: 5/6/2024  PROCEDURE: XR CHEST AP PORTABLE  (CPT=71045) TIME: 2008 hours.   COMPARISON: [facility], XR CHEST AP PORTABLE (CPT=71045), 5/01/2024, 10:51 AM.  [facility], XR CHEST AP PORTABLE (CPT=71045), 3/22/2023, 10:46 AM.  [facility], XR CHEST AP PORTABLE (CPT=71045), 8/14/2022, 4:31 PM.  INDICATIONS: Shortness of breath today  TECHNIQUE:   Single view.   FINDINGS:  CARDIAC/VASC: Mild cardiomegaly.  Aortic atherosclerosis. MEDIAST/CARLOS:   No visible mass or adenopathy. LUNGS/PLEURA: Bilateral pleural effusions, left greater than right.  Subsegmental atelectasis.  Prominent bronchovascular markings.  No pneumothorax. BONES: No fracture or visible bony lesion. OTHER: Negative.          CONCLUSION:   Bilateral small pleural effusions, left greater than right and associated atelectasis.  Questionable pulmonary edema.  Background scarring/fibrosis again noted.    [facility]    Dictated by (CST): Thaddeus Medley MD on 5/06/2024 at 9:15 PM     Finalized by (CST): Thaddeus Medley MD on 5/06/2024 at 9:16 PM          CARD ECHO 2D  DOPPLER (CPT=93306)    Result Date: 2024  Transthoracic Echocardiogram Name:Lina Dudley Date: 2024 :  1926 Ht:  (65in)  BP: 104 / 71 MRN:  [id number]    Age:  89years    Wt:  (112lb) HR: 92bpm Loc:  [facility]       Gndr: F          BSA: 1.55m^2 Sonographer: Clarissa Greenwood Ordering:    [employer]:  Todd Pedroza ---------------------------------------------------------------------------- History/Indications:   Congestive heart failure. Blood tests:    Troponin elevated. ---------------------------------------------------------------------------- Procedure information:  A transthoracic complete 2D study was performed. Additional evaluation included M-mode, complete spectral Doppler, and color Doppler.  Patient status:  Outpatient.  Location:  Bedside.    Comparison was made to the study of 08/15/2022.    This was a routine study. Transthoracic echocardiography for diagnosis and ventricular function evaluation. Image quality was adequate. ECG rhythm:   Normal sinus ---------------------------------------------------------------------------- Conclusions: 1. Left ventricle: The cavity size was normal. Wall thickness was normal.    Systolic function was normal. The estimated ejection fraction was 60-65%,    by visual assessment. No diagnostic evidence for regional wall motion    abnormalities. Left ventricular diastolic function parameters were normal    for the patient's age. 2. Right ventricle: The cavity size was increased. 3. Left atrium: The left atrial volume was normal. 4. Right atrium: The atrium was dilated. 5. Aortic valve: The valve was trileaflet. The leaflets were mildly    thickened and mildly calcified. 6. Mitral valve: The leaflets were mildly calcified. Prolapse. There was    moderate regurgitation. 7. Tricuspid valve: There was moderate-severe regurgitation. 8. Pulmonary arteries: Systolic pressure was moderately to markedly    increased, estimated to be 69mm Hg.  Impressions:  This study is compared with previous dated 08/15/2022: * ---------------------------------------------------------------------------- * Findings: Left ventricle:  The cavity size was normal. Wall thickness was normal. Systolic function was normal. The estimated ejection fraction was 60-65%, by visual assessment. No diagnostic evidence for regional wall motion abnormalities. Left ventricular diastolic function parameters were normal for the patient's age. Left atrium:  The atrium was normal in size. The left atrial volume was normal. Right ventricle:  The cavity size was increased. Systolic function was low normal. Right atrium:  The atrium was dilated. Mitral valve:  The leaflets were mildly calcified. Leaflet separation was normal.  Prolapse.  Doppler:  Transvalvular velocity was within the normal range. There was no evidence for stenosis. There was moderate regurgitation. Aortic valve:   The valve was trileaflet. The leaflets were mildly thickened and mildly calcified.  Doppler:  Transvalvular velocity was within the normal range. There was no evidence for stenosis. There was no significant regurgitation. Tricuspid valve:  The valve is structurally normal. Leaflet separation was normal.  Doppler:  Transvalvular velocity was within the normal range. There was no evidence for stenosis. There was moderate-severe regurgitation. Pulmonic valve:   The valve is structurally normal. Cusp separation was normal.  Doppler:  Transvalvular velocity was within the normal range. There was no evidence for stenosis. There was no significant regurgitation. Pericardium:   There was no pericardial effusion. Aorta: Aortic root: The aortic root was normal. Ascending aorta: The ascending aorta was normal. Pulmonary arteries: Systolic pressure was moderately to markedly increased, estimated to be 69mm Hg. Systemic veins:  Central venous respirophasic diameter changes are blunted (< 50%). Inferior vena cava: The IVC was  normal-sized. ---------------------------------------------------------------------------- Measurements  Left ventricle                    Value        Ref  IVS thickness, ED, PLAX           0.7   cm     0.6 -                                                 0.9  LV ID, ED, PLAX               (L) 3.7   cm     3.8 -                                                 5.2  LV ID, ES, PLAX                   2.6   cm     2.2 -                                                 3.5  LV PW thickness, ED, PLAX         0.7   cm     0.6 -                                                 0.9  IVS/LV PW ratio, ED, PLAX         1.00         --------  LV PW/LV ID ratio, ED, PLAX       0.19         --------  LV ejection fraction              58    %      54 - 74  Stroke volume/bsa, 2D             16    ml/m^2 --------  LV e', lateral                    10.0  cm/sec >=10.0  LV E/e', lateral                  13           <=13  LV e', medial                     7.8   cm/sec >=7.0  LV E/e', medial                   16           --------  LV e', average                    8.9   cm/sec --------  LV E/e', average                  14           <=14  LVOT                              Value        Ref  LVOT ID                           1.8   cm     --------  LVOT peak velocity, S             0.73  m/sec  --------  LVOT VTI, S                       9.7   cm     --------  LVOT peak gradient, S             2     mm Hg  --------  LVOT mean gradient, S             1     mm Hg  --------  Stroke volume (SV), LVOT DP       25    ml     --------  Stroke index (SV/bsa), LVOT       16    ml/m^2 --------  DP  Aortic root                       Value        Ref  Aortic root ID                    3.1   cm     2.3 -                                                 3.8  Ascending aorta                   Value        Ref  Ascending aorta ID                2.8   cm     1.9 -                                                 3.5  Left atrium                       Value         Ref  LA ID, A-P, ES                    2.8   cm     2.7 -                                                 3.8  LA volume, S                      45    ml     22 - 52  LA volume/bsa, S                  29    ml/m^2 16 - 34  LA volume, ES, 1-p A4C            43    ml     22 - 52  LA volume, ES, 1-p A2C            39    ml     22 - 52  LA volume, ES, A/L                47    ml     --------  LA volume/bsa, ES, A/L            31    ml/m^2 16 - 34  LA/aortic root ratio              0.9          --------  Mitral valve                      Value        Ref  Mitral E-wave peak velocity       1.28  m/sec  --------  Mitral A-wave peak velocity       0.47  m/sec  --------  Mitral peak gradient, D           7     mm Hg  --------  Mitral E/A ratio, peak            2.7          --------  Pulmonary artery                  Value        Ref  PA pressure, S, DP                69    mm Hg  --------  Tricuspid valve                   Value        Ref  Tricuspid regurg peak         (H) 3.9   m/sec  <=2.8  velocity  Tricuspid peak RV-RA gradient     61    mm Hg  --------  Systemic veins                    Value        Ref  Estimated CVP                     8     mm Hg  --------  Inferior vena cava                Value        Ref  ID                                1.6   cm     <=2.1  Right ventricle                   Value        Ref  TAPSE, MM                     (L) 1.56  cm     >=1.70  RV pressure, S, DP                69    mm Hg  --------  RV s', lateral                    10.0  cm/sec >=9.5 Legend: (L)  and  (H)  tylor values outside specified reference range. ---------------------------------------------------------------------------- Prepared and electronically signed by Tj Lange 05/02/2024 10:43     XR CHEST AP PORTABLE  (CPT=71045)    Result Date: 5/1/2024  PROCEDURE: XR CHEST AP PORTABLE  (CPT=71045) TIME: 1051 hours  COMPARISON: [facility], XR CHEST AP PORTABLE (CPT=71045), 3/22/2023, 10:46   INDICATIONS: Hypoxemia.  Shortness of breath COPD  TECHNIQUE:   Single view.   FINDINGS:  CARDIAC/VASC: Borderline cardiomegaly.  Prominent central vasculature.  MEDIAST/CARLOS:   Atherosclerotic aorta with no visible aneurysm.  LUNGS/PLEURA: Emphysematous changes with chronic perihilar basilar pulmonary interstitium that has progressed.  Findings may reflect acute on chronic changes.  Progression of interstitial prominence may reflect progression of interstitial lung disease versus superimposed atypical viral pneumonia.  Recommend clinical correlation.  Granulomatous calcifications left lung base.  Minimal chronic right basilar pleural reaction. BONES: Mild scoliosis with mild degenerative disc disease and spondylosis.  OTHER: Negative.          CONCLUSION:  1. Emphysematous changes with chronic prominent pulmonary interstitium. 2. Slight progression of diffuse interstitial prominence may reflect progression of pulmonary interstitial fibrosis versus superimposed atypical viral pneumonia.  Recommend clinical correlation. 3. Borderline cardiomegaly.  Prominent central vasculature.    Dictated by (CST): Sky Linn MD on 5/01/2024 at 11:13 AM     Finalized by (CST): Sky Linn MD on 5/01/2024 at 11:16 AM          CT SPINE CERVICAL (CPT=72125)    Result Date: 5/1/2024  PROCEDURE: CT SPINE CERVICAL (CPT=72125)  COMPARISON: [facility], CT SPINE CERVICAL (CPT=72125), 8/14/2022, 4:13 PM.  INDICATIONS: Fall with posttraumatic neck pain.  TECHNIQUE: Multidetector CT images of the cervical spine were obtained without the infusion of non-ionic intravenous contrast material. Automated exposure control for dose reduction was used. Adjustment of the mA and/or kV was done based on the patient's  size. Iterative reconstruction technique for dose reduction was employed. Dose information was transmitted to the ACR (American College of Radiology) NRDR (National Radiology Data Registry), which includes  the Dose Index Registry.   FINDINGS: ALIGNMENT: The anatomic cervical lordosis is straightened with trace anterolisthesis of C2 on C3 and C3 on C4, as well as minimal anterolisthesis of C5 on C6. BONES: No fractures or osseous lesions are apparent. Multilevel anterior osteophyte formation is demonstrated. CRANIOCERVICAL AREA: Normal foramen magnum without Chiari malformation.  CERVICAL DISC LEVELS: There is degeneration between the anterior arch of C1 and the odontoid process. There is no rotational abnormality of the atlantoaxial articulation. There is posterior disc osteophyte complex formation at C4-C5, C5-C6, and C6-C7 with prominent uncovertebral hypertrophy. Significant degeneration is seen at C2-C3 and C3-C4 with associated uncovertebral joint hypertrophy. Multilevel facet arthrosis is demonstrated. PARASPINAL AREA: No visible mass.  OTHER: There is no visible swelling of the prevertebral soft tissues. Atherosclerotic vascular calcifications of the carotid bifurcations are seen. Background parenchymal findings of severe upper lobe predominant centrilobular emphysema are noted. Right pleural effusion is demonstrated.          CONCLUSION:  1. No acute fracture or posttraumatic malalignment of the cervical spine is demonstrated.  2. Substantial multilevel degenerative changes of cervical spine are evident.  3. Partially visualized severe centrilobular emphysema.  4. Incompletely delineated right pleural effusion.  5. Lesser incidental findings as above.    Dictated by (CST): Vitaliy Tran MD on 5/01/2024 at 11:11 AM     Finalized by (CST): Vitaliy Tran MD on 5/01/2024 at 11:13 AM          CT BRAIN OR HEAD (25257)    Result Date: 5/1/2024  PROCEDURE: CT BRAIN OR HEAD (CPT=70450)  COMPARISON: [facility], CT BRAIN HEAD WO CONTRAST, 11/25/2011, 5:00 PM.  [facility], CT BRAIN OR HEAD (CPT=70450), 8/14/2022, 4:13 PM.  INDICATIONS: Fall. Transient alteration of awareness.   TECHNIQUE: CT images were obtained without contrast material. Automated exposure control for dose reduction was used. Dose information was transmitted to the ACR (American College of Radiology) NRDR (National Radiology Data Registry), which includes the Dose Index Registry.   FINDINGS:  CSF SPACES: The ventricles, cisterns, and sulci are dilated, but remain commensurate in caliber, consistent with parenchymal volume loss of a degree that is appropriate for age. No hydrocephalus, subarachnoid hemorrhage, or effacement of the basal cisterns is appreciated. There is no extra-axial fluid collection. CEREBRUM: No acute intraparenchymal hemorrhage, edema, or cortical sulcal effacement is apparent. There is no space-occupying lesion, mass effect, or shift of midline structures. The gray-white matter junction is preserved and bilaterally symmetric in appearance.  Scattered periventricular and subcortical white matter hypodensities are present, consistent with chronic microvascular ischemic disease. CEREBELLUM: No edema, hemorrhage, mass, or acute infarction is seen. There is cerebellar folial expansion consistent with atrophy.  BRAINSTEM: Patchy areas of low attenuation likely reflect sequela of chronic small vessel ischemic disease. No edema, hemorrhage, mass, or acute infarction is seen. There is commensurate atrophy.  CALVARIUM: There is no apparent depressed fracture, mass, or other significant visible lesion.  SINUSES: Limited views demonstrate diffuse mucosal thickening and/or fluid of the maxillary sinuses bilaterally. Sclerosis and wall thickening likely reflect chronic mucoperiosteal reaction. Extensive aerated secretions are seen throughout the frontal sinuses, extending to the frontoethmoidal recesses. Scattered mucosal thickening and aerated secretions of the ethmoid air cells and bilateral sphenoid sinuses are seen.  ORBITS: Limited views are notable for postoperative changes of the lenses bilaterally.   OTHER: Atherosclerotic vascular calcifications are perceived in the cavernous carotid and distal vertebral arteries. Extensive filling defects are present in the right external auditory canal, likely reflecting impacted cerumen. Extensive dental amalgam is seen on the  view.           CONCLUSION:  1. Negative for depressed calvarial fracture, coup/contrecoup intraparenchymal contusion, intracranial hemorrhage, or further evidence of acute intracranial process by noncontrast CT technique.  2. Senescent changes of parenchymal volume loss with sequela of chronic microvascular ischemic disease.  3. There is also large vessel atherosclerosis involving the anterior and posterior intracranial circulations.  4. Extensive paranasal sinus disease is apparent.  5. Lesser incidental findings as above.      Dictated by (CST): Vitaliy Tran MD on 5/01/2024 at 11:07 AM     Finalized by (CST): Vitaliy Tran MD on 5/01/2024 at 11:09 AM                  [facility]        Admission disposition: 5/11/2024  8:44 PM                                        Medical Decision Making  Patient stable here.  Labs a little bit reassuring.  I believe her presentation is multifactorial.  Did give 1 dose of Lasix because her pressure was stable.  Her niece Ashley is here with her now.  Patient does not feel comfortable going back to her facility.  Will put her on a [facility] remote telemetry bed overnight under the hospitalist.  Cardio can see the patient in the morning and make further recommendations.  She was given nebs and steroids as well.    Problems Addressed:  Acute on chronic congestive heart failure, unspecified heart failure type (HCC): chronic illness or injury with exacerbation, progression, or side effects of treatment  Chronic respiratory failure, unspecified whether with hypoxia or hypercapnia (HCC): chronic illness or injury with severe exacerbation, progression, or side effects of treatment  COPD exacerbation (HCC): chronic illness or  injury with exacerbation, progression, or side effects of treatment    Amount and/or Complexity of Data Reviewed  External Data Reviewed: notes.     Details: Date of Admission: 5/6/2024                       Date of Discharge: 05/09/24        Admitting Diagnosis: Hyperkalemia [E87.5]  QUITA (acute kidney injury) (HCC) [N17.9]  Acute on chronic congestive heart failure, unspecified heart failure type (HCC) [I50.9]     Hospital Discharge Diagnoses:  CHF, QUITA     Lace+ Score: 71  59-90 High Risk  29-58 Medium Risk  0-28   Low Risk.     TCM Follow-Up Recommendation:  LACE > 58: High Risk of readmission after discharge from the hospital.              Problem List:   Patient Active Problem List  Diagnosis  · Sciatica of right side  · Mixed hyperlipidemia  · Medicare annual wellness visit, subsequent  · Chronic obstructive pulmonary disease (HCC)  · Macular degeneration  · Presbyopia  · Venous insufficiency  · Osteoarthritis  · Urinary frequency  · Constipation  · Elevated troponin I level  · Fall, initial encounter  · Closed fracture of ninth thoracic vertebra, unspecified fracture morphology, initial encounter (HCC)  · Protein-calorie malnutrition, unspecified severity (HCC)  · Acute on chronic congestive heart failure (HCC)  · Acute on chronic respiratory failure with hypoxia (HCC)  · Goals of care, counseling/discussion  · Advance care planning  · Palliative care by specialist  · Hyperkalemia  · Acute on chronic congestive heart failure, unspecified heart failure type (HCC)  · QUITA (acute kidney injury) (HCC)           Physical Exam:      Gen: No acute distress  Pulm: Lungs clear, normal respiratory effort  CV: Heart with regular rate and rhythm  Abd: Abdomen soft, nontender, nondistended, bowel sounds present  Neuro: No acute focal deficits  MSK: Full range of motion in extremities  Skin: Warm and dry  Psych: Normal affect  Ext: no c/c/e        History of Present Illness: Per Dr Boy Dudley is a 89 year  old female with hx of COPD, pulmonary hypertension, and HFpEF, who p/w dyspnea for 1 day. Patient was just hospitalized 5/1-5/4/24 for UTI, QUITA and HFpEF. She was seen by Cards and Pulm. She was discharged to [facility]. No diuretic at discharge. Patient developed dyspnea this morning 5/6/24. Oxygen was increased from 2L to 4L with SpO2 of 94%. Patient was seen by NP at the time. No other history was available because patient was very lethargic in the ED. When she was sent to ED by EMS, patient has SpO2 99% on 4L. Blood work has na 130, K 6.1, Cr 1.7, WBC 16. CXR has more infiltrates and pleural effusion than CXR 5/1/24. Lokelma, dextrose/insulin and calcium given in ED.      Hospital Course:      # HFpEF exacerbation  - Echo from 5/2 with LVEF 60-65%, no RWMA, moderate MR, Severe TR, PASP 69mmHg   - now patient has higher BNP, and more infiltrates and pleural effusion on CXR  - Cards consult appreciated   - given lower pressures holding further diuresis  - follow up as outpt     # Acute kidney injury  - Cr 1.1 on 5/3/24, now 1.7 on arrival, BMP in AM shows Cr of 1.29     # Hyperkalemia  - K 6.1 on arrival  - Lokelma, dextrose/insulin and calcium given in ED, repeat in AM  -repeat K improved  -plan low potassium diet     # Hyponatremia  - Na 130 on arrival, mild, will monitor     # Chronic hypoxemic respiratory failure  # Possible mild early interstitial lung disease   # Pulmonary hypertension  # COPD  # Pneumonia  - was on intermittent 1L O2 at home, now SpO2 96% on 2L  -? Empiric abx. for underlying pneumonia  -check procal - slightly elevated but she is not symptomatic and cxr shows improvement so no abx for dc     # Elevated troponin  - troponin 171 on 5/1/24, now down to 58 on arrival     # UTI  - urine culture 5/1/24 grew Klebsiella, sensitive to cefazolin, has been receiving abx since, on cefadroxil at SNF.  - treated     # Debility  - patient lived alone with independent ADLs prior to the  hospitalization on 5/1/24  - PT/OT - [facility] to [facility]     Discharge Condition: Stable    Labs: ordered. Decision-making details documented in ED Course.  Radiology: ordered and independent interpretation performed. Decision-making details documented in ED Course.     Details: My gross review of the chest x-ray there appears to be likely a pattern of pulmonary edema and questionable effusions  ECG/medicine tests: ordered and independent interpretation performed. Decision-making details documented in ED Course.    Risk  Drug therapy requiring intensive monitoring for toxicity.  Decision regarding hospitalization.        Disposition and Plan     Clinical Impression:  1. Chronic respiratory failure, unspecified whether with hypoxia or hypercapnia (HCC)    2. Acute on chronic congestive heart failure, unspecified heart failure type (HCC)    3. COPD exacerbation (HCC)         Disposition:  Admit  5/11/2024  8:44 pm    Follow-up:  Asha Mendes MD  [address] [place] [id number]  [phone]    Call      We recommend that you schedule follow up care with a primary care provider within the next three months to obtain basic health screening including reassessment of your blood pressure.      Medications Prescribed:  Current Discharge Medication List        START taking these medications    Details   predniSONE 20 MG Oral Tab Take 2 tablets (40 mg total) by mouth daily for 2 days, THEN 1 tablet (20 mg total) daily for 3 days.  Qty: 7 tablet, Refills: 0                               Hospital Problems       Present on Admission  Date Reviewed: 6/29/2023            ICD-10-CM Noted POA    * (Principal) Chronic respiratory failure, unspecified whether with hypoxia or hypercapnia (HCC) J96.10 5/11/2024 Unknown

## 2024-05-12 NOTE — OCCUPATIONAL THERAPY NOTE
OCCUPATIONAL THERAPY EVALUATION - INPATIENT     Room Number: 335/335-A  Evaluation Date: 5/12/2024  Type of Evaluation: Initial  Presenting Problem: chronic respiratory failure    Physician Order: IP Consult to Occupational Therapy  Reason for Therapy: ADL/IADL Dysfunction and Discharge Planning    OCCUPATIONAL THERAPY ASSESSMENT   Patient is a 89 year old female admitted 5/11/2024 for c/o SOB, recent UTI, admitted form rehab.  Prior to admission, patient's baseline is living home alone, indep with self care and using a rw. Pt recently admitted to [facility] 5/1/24-5/4/24. Patient is currently functioning below baseline with toileting, bathing, upper body dressing, lower body dressing, grooming, eating, bed mobility, transfers, stating sitting balance, dynamic sitting balance, static standing balance, dynamic standing balance, maintaining seated position, functional standing tolerance, and performing household tasks.  Patient is requiring maximum assist as a result of the following impairments: decreased functional strength, decreased functional reach, decreased endurance, pain, impaired seated and standing balance, impaired coordination, and increased O2 needs from baseline. Occupational Therapy will continue to follow for duration of hospitalization.    Patient will benefit from continued skilled OT Services to promote return to prior level of function and safety with continuous assistance and gradual rehabilitative therapy     PLAN  OT Treatment Plan: Balance activities;ADL training;Energy conservation/work simplification techniques;Functional transfer training;UE strengthening/ROM;Endurance training;Patient/Family education;Patient/Family training;Equipment eval/education;Compensatory technique education  OT Device Recommendations: TBD    OCCUPATIONAL THERAPY MEDICAL/SOCIAL HISTORY   Problem List  Principal Problem:    Chronic respiratory failure, unspecified whether with hypoxia or hypercapnia (HCC)  Active  Problems:    Acute on chronic congestive heart failure, unspecified heart failure type (HCC)    COPD exacerbation (HCC)    HOME SITUATION  Type of Home: House  Home Layout: Two level  Lives With: Alone  Drives: No  Use of Assistive Device(s): rw      SUBJECTIVE  I am usually not this nice    OCCUPATIONAL THERAPY EXAMINATION    OBJECTIVE  Precautions: Bed/chair alarm  Fall Risk: High fall risk    PAIN ASSESSMENT  Rating: Unable to rate  Location: chest from deep breathing  Management Techniques: Repositioning    ACTIVITY TOLERANCE  Pulse: 99  Heart Rate Source: Monitor     BP: 98/73  BP Location: Right arm  BP Method: Automatic  Patient Position: Sitting    O2 SATURATIONS  Oxygen Therapy  SPO2% on Oxygen at Rest: 94  At rest oxygen flow (liters per minute): 0.5  SPO2% Ambulation on Oxygen: 90  Ambulation oxygen flow (liters per minute): 1    COGNITION  Lethargic, follows simple commands with increased time    VISION  Current Vision: appears intact    PERCEPTION  Overall Perception Status:   WFL - within functional limits    Communication: able to make needs known    Behavioral/Emotional/Social: cooperative    RANGE OF MOTION   Upper extremity ROM is within functional limits     STRENGTH ASSESSMENT  Upper extremity strength is within functional limits     COORDINATION  Gross Motor: WFL   Fine Motor: WFL     ACTIVITIES OF DAILY LIVING ASSESSMENT  -PAC ‘6-Clicks’ Inpatient Daily Activity Short Form  How much help from another person does the patient currently need…  -   Putting on and taking off regular lower body clothing?: A Lot  -   Bathing (including washing, rinsing, drying)?: A Lot  -   Toileting, which includes using toilet, bedpan or urinal? : A Lot  -   Putting on and taking off regular upper body clothing?: A Little  -   Taking care of personal grooming such as brushing teeth?: A Little  -   Eating meals?: A Little    AM-PAC Score:  Score: 15  Approx Degree of Impairment: 56.46%  Standardized Score (AM-PAC  Scale): 34.69  CMS Modifier (G-Code): CK    FUNCTIONAL TRANSFER ASSESSMENT  Sit to Stand: Edge of Bed  Edge of Bed: Moderate Assist    BED MOBILITY  Supine to Sit : Moderate Assist    FUNCTIONAL ADL ASSESSMENT  Eating: -- (set up)  Grooming Seated: Minimal Assist  Bathing Seated: Moderate Assist  UB Dressing Seated: Minimal Assist  LB Dressing Seated: Maximum Assist  Toileting Seated: Maximum Assist    Skilled Therapy Provided:   Facilitated OOB activity - pt requires some encouragement. She overall requires mod a for bed mob, with cues for sequencing and hand placement. She requires increased time for rest breaks in between transitional movement, min a for seated balance initially. Pt noted to have increased WOB upon arrival and throughout session, however spo2 remained ~90%. Patient is completes STS with rw from EOB 1x with mod a, unable to come to full stand 2/2 fatigue and weakness. SPT to bedside chair with MAX A x1 and min a x1 for safety.     EDUCATION PROVIDED  Educated pt on role of OT in acute care setting, activity pacing, compensatory strategies, pursed lip breathing, transfer training,, physiological benefits of functional mobility/OOB activity and POC - pt verbalized understanding.       The patient's Approx Degree of Impairment: 56.46% has been calculated based on documentation in the [facility] '6 clicks' Inpatient Daily Activity Short Form.  Research supports that patients with this level of impairment may benefit from rehab.  Final disposition will be made by interdisciplinary medical team.     Patient End of Session: Up in chair;Call light within reach;Needs met;RN aware of session/findings;All patient questions and concerns addressed;Alarm set    OT Goals  Patients self stated goal is: did not state     Patient will complete functional transfer with cga  Comment:     Patient will complete toileting with cga  Comment:     Patient will tolerate standing for 2-3 minutes in prep for adls with cga    Comment:               Goals  on:   Frequency: 3x week    Caryn Rose OTR/L  [facility]       Patient Evaluation Complexity Level:   Occupational Profile/Medical History MODERATE - Expanded review of history including review of medical or therapy record   Specific performance deficits impacting engagement in ADL/IADL MODERATE  3 - 5 performance deficits   Client Assessment/Performance Deficits MODERATE - Comorbidities and min to mod modifications of tasks    Clinical Decision Making MODERATE - Analysis of occupational profile, detailed assessments, several treatment options    Overall Complexity MODERATE       Therapeutic Activity: 20 minutes

## 2024-05-12 NOTE — ED QUICK NOTES
Orders for admission, patient is aware of plan and ready to go upstairs. Any questions, please call ED RN carmelo at extension [phone].     Patient Covid vaccination status: Fully vaccinated     COVID Test Ordered in ED: None    COVID Suspicion at Admission: N/A    Running Infusions:  None    Mental Status/LOC at time of transport: ao x 2-3 [facility]    Other pertinent information:     S: pt presents with reported cp from nursing home. Upon arrival only complaint is right shoulder pain which she states is chronic. Labs are improved from baseline but patient would prefer to stay in hospital.    B: wears o2 2L at all times, chf, admitted x 2 in the few weeks.    A: pt has various scattered bruises and slight increase in wob. Pt has a dressing to sacrum, not removed at this time. IV 22g left ac area from EMS.    R: received duoneb solumedrol and lasix. Purewick in place. Family (niece) at bedside.     CIWA score: N/A   NIH score:  N/A

## 2024-05-12 NOTE — H&P
History and Physical     Lina Dudley Patient Status:  Emergency    1926 MRN [id number]   Location [facility] EMERGENCY DEPARTMENT Attending Pj Hill MD   Hosp Day # 0 PCP ROBYN KULKARNI MD     Most of history obtained from niece Christina at bedside.  Patient very hard of hearing.    Chief Complaint: Shortness of breath    Subjective:    Lina Dudley is a 89 year old female with history of COPD, hyperlipidemia, A-fib, HTN, CHF, pHTN, chronic respiratory failure who was sent from nursing home today for evaluation of worsening shortness of breath.  Has no chest pain but notes pain in her left shoulder and hip.  This is the third hospitalization this month.  Discharged 2 days ago.  Treated for HFpEF with inpatient diuresis.  No diuretics at discharge due to low blood pressure, QUITA.  No fever, chills, nausea, vomiting or abdominal pain.    Vital signs stable  Labs with sodium 130, potassium 5.2, creatinine 1.32  BNP 1794  Procalcitonin 0.18, WBC 13.2  CXR: CHF/fluid overload superimposed on emphysema.  Slight loculation of small bibasilar pleural effusions.  Sinus rhythm with PACs.  Minimal voltage criteria for LVH.    She has been given a dose of Lasix, Solu-Medrol and nebulizers..  Cardiology consulted.  Being admitted for further treatment.    History/Other:      Past Medical History:  Past Medical History:    Appendicitis    APPENDECTOMY    Arthritis    Basal cell carcinoma    Of Face     Chicken pox    COPD (chronic obstructive pulmonary disease) (HCC)    Left arm numbness    Measles    Other and unspecified hyperlipidemia    Pneumonia    Tonsillitis    Unspecified essential hypertension        Past Surgical History:   Past Surgical History:   Procedure Laterality Date    Appendectomy      Cataract extraction Bilateral     Dr. Tirado's office    Glaucoma surgery      Hip replacement surgery      Skin surgery  Excision of Basal cell carcinoma of face     Tonsillectomy      T&A        Social History:  reports that she quit smoking about 34 years ago. Her smoking use included cigarettes. She started smoking about 64 years ago. She has a 30 pack-year smoking history. She has quit using smokeless tobacco. She reports that she does not drink alcohol and does not use drugs.    Family History:   Family History   Problem Relation Age of Onset    Cancer Father     Hypertension Father     Heart Disease Father     Heart Disease Mother     Gastro-Intestinal Disorder Maternal Grandmother         TUBERCULOSIS    Breast Cancer Maternal Aunt         80s    Neurological Disorder Other         GREAT AUNT, EPILEPSY    Lipids Other         HYPERLIPIDEMIA    Glaucoma Neg     Diabetes Neg        Allergies:   Allergies   Allergen Reactions    Bacitracin UNKNOWN    Neosporin Original [Neomycin-Bacitracin-Polymyxin] ITCHING    Polymyxin B UNKNOWN       Medications:    Current Facility-Administered Medications on File Prior to Encounter   Medication Dose Route Frequency Provider Last Rate Last Admin    [] sodium zirconium cyclosilicate (Lokelma) oral packet 10 g  10 g Oral Q8H Nat Reardon MD   10 g at 24 0345    [COMPLETED] insulin regular human (Novolin R, Humulin R) 100 UNIT/ML injection 10 Units  10 Units Intravenous Once Nat Reardon MD   10 Units at 24 0334    [COMPLETED] dextrose 50% injection  mL   mL Intravenous Once Nat Reardon MD   100 mL at 24 0334    [] dextrose 50% injection 25 mL  25 mL Intravenous Q1H PRN Nat Reardon MD        [COMPLETED] furosemide (Lasix) tab 40 mg  40 mg Oral Once Ciaran, Setu, DO   40 mg at 24 1426    [COMPLETED] calcium gluconate 1g in 100mL iso-NaCl IVPB premix  1 g Intravenous Once Jazmyne Mercado MD   Stopped at 24    [COMPLETED] dextrose 50% injection 50 mL  50 mL Intravenous Once Jazmyne Mercado MD   50 mL at 24    [COMPLETED] insulin regular human (Novolin R, Humulin R) 100  UNIT/ML injection 10 Units  10 Units Intravenous Once Jazmyne Mercado MD   10 Units at 05/06/24 2037    [COMPLETED] sodium bicarbonate injection 50 mEq  50 mEq Intravenous Once Jazmyne Mercdao MD   50 mEq at 05/06/24 2057    [COMPLETED] sodium zirconium cyclosilicate (Lokelma) oral packet 10 g  10 g Oral Once Jazmyne Mercado MD   10 g at 05/06/24 2201    [COMPLETED] acetaminophen (Tylenol Extra Strength) tab 1,000 mg  1,000 mg Oral Once Jazmyne Mercado MD   1,000 mg at 05/06/24 2049    [COMPLETED] furosemide (Lasix) 10 mg/mL injection 20 mg  20 mg Intravenous Once Jazmyne Mercado MD   20 mg at 05/06/24 2144    [COMPLETED] cefTRIAXone (Rocephin) 1 g in D5W 100 mL IVPB-ADD  1 g Intravenous Once Meera Hodgson MD   Stopped at 05/01/24 1348     Current Outpatient Medications on File Prior to Encounter   Medication Sig Dispense Refill    metoprolol tartrate 25 MG Oral Tab Take 0.5 tablets (12.5 mg total) by mouth 2x Daily(Beta Blocker). 60 tablet 0    ipratropium-albuterol 0.5-2.5 (3) MG/3ML Inhalation Solution Take 3 mL by nebulization every 6 (six) hours as needed.      polyethylene glycol, PEG 3350, 17 g Oral Powd Pack Take 17 g by mouth daily as needed.      docusate sodium 100 MG Oral Cap Take 100 mg by mouth 2 (two) times daily.      albuterol 108 (90 Base) MCG/ACT Inhalation Aero Soln inhale 2 puff by inhalation route  every 4 - 6 hours as needed 1 each 5    atorvastatin 10 MG Oral Tab Take 1 tablet (10 mg total) by mouth nightly. 30 tablet 5    fluticasone furoate-vilanterol (BREO ELLIPTA) 100-25 MCG/ACT Inhalation Aerosol Powder, Breath Activated Inhale 1 puff into the lungs every morning. To follow with gargle, rinse, & spit after using BREO to help reduce your chance of getting thrush. 1 each 5    acetaminophen 500 MG Oral Tab Take 1 tablet (500 mg total) by mouth every 4 (four) hours as needed for Fever (equal to or greater than 100.4).  0       Review of Systems:   A comprehensive 14 point review of systems  was completed.    Pertinent positives and negatives noted in the HPI.    Objective:     BP 98/80   Pulse 79   Temp 97.4 °F (36.3 °C) (Rectal)   Resp 16   Ht 5' 7\" (1.702 m)   Wt 100 lb (45.4 kg)   SpO2 99%   BMI 15.66 kg/m²   General: No acute distress.    HEENT: Normocephalic.  Neck: Supple, no JVD.  Respiratory: Diminished breath sounds, basal Rales  Cardiovascular: S1, S2 regular.  Normal rate.   Abdomen: Soft, not tender or distended..  Neurologic: Alert, oriented x 4.  No focal deficits..  Musculoskeletal: No tenderness or deformity.  Extremities: Plus bilateral lower extremity edema  Integument: Bruise to left shoulder.   Psychiatric: Cooperative    Results:      Labs:  Labs Last 24 Hours:   BMP     CBC    Other     Na 130 Cl 99 BUN 50 Glu 118   Hb 14.3   PTT - Procal 0.18   K 5.2 CO2 25.0 Cr 1.32   WBC 13.2 >< .0  INR - CRP -   Renal Lytes Endo    Hct 43.7   Trop - D dim -   eGFR - Ca 8.4 POC Gluc  -    LFT   pBNP - Lactic -   eGFR AA - PO4 - A1c -   AST 36 APk 195 Prot 5.9  LDL -     Mg - TSH -   ALT 18 T briseida 1.2 Alb 3.5          COVID-19 Lab Results    COVID-19  Lab Results   Component Value Date    COVID19 Not Detected 05/06/2024    COVID19 Not Detected 05/01/2024    COVID19 Not Detected 03/22/2023       Pro-Calcitonin  Recent Labs   Lab 05/08/24  1536 05/11/24  1836   PCT 0.66* 0.18*       Cardiac  No results for input(s): \"TROP\", \"PBNP\" in the last 168 hours.    Creatinine Kinase  Recent Labs   Lab 05/07/24  0623   CK 65       Inflammatory Markers  No results for input(s): \"CRP\", \"ANASTASIIA\", \"LDH\", \"DDIMER\" in the last 168 hours.    Imaging: Imaging data reviewed in Epic.    Assessment & Plan:    Shortness of breath  CHF exacerbation  COPD exacerbation  Pulmonary hypertension  Chronic respiratory failure, currently at baseline oxygen 2 L  -Admit  -Gentle diuresis as blood pressure tolerates/ defer to cardiology.  BNP improved when compared to prior  -Telemetry  -Cardiology has been consulted,  follow recommendations  -Steroids, nebs, antibiotics    Atrial fibrillation, noted last admission  -Sinus rhythm currently  -Telemetry  -Not candidate for anticoagulation given recurrent falls    Renal insufficiency/QUITA  Hyponatremia  -Creatinine stable compared to recent admission  -Monitor on diuretics    Leukocytosis  -Procalcitonin minimally elevated  -No obvious sign/symptom of infection    Mild hyperkalemia  -Received Lasix in the ED  -Monitor    Hyperlipidemia  HTN  -Resume home medications    Quality:  DVT Prophylaxis: Heparin  CODE status: DNR/select  ASHLEY: 3-5 days      Plan of care discussed with patient and niece. Acknowledged understanding and agrees to plan. Also discussed with the ED physician.    High MDM.  Time spent on this admission - examining patient, obtaining history, reviewing previous medical records, going over test results/imaging and discussing plan of care. More than 50% of the time was spent in counseling and/or coordination of care related to CHF/COPD exacerbation.   All questions answered.     Nat Reardon MD  5/11/2024

## 2024-05-12 NOTE — CONSULTS
Cardiology Consult Note    Lina Dudley Patient Status:  Observation    1926 MRN [id number]   Location [facility] 3W/SW Attending Mayi Gaxiola MD   Hosp Day # 0 PCP ROBYN KULKARNI MD     HPI.  Lina Dudley is a 89 year old female with a history of heart failure, COPD, pulm hypertension presents to the hospital with complaints of shoulder pain, possible chest pain.  Patient was admitted from  -  after a fall and management of hypoxia and soon after discharge, patient returned 2 days later and was admitted from  -  for management of hyperkalemia, QUITA and CHF.    In the emergency room yesterday, ED documentation states that patient was complaining of chest tightness and shortness of breath.  Triage vitals pertinent for /77, pulse 97, respirate 18.  Lab work with creatinine 1.32, potassium 5.2, WBC 13.2, NT proBNP [id number] (down from 3100 on 2024) chest x-ray suggestive of possible volume overload with as well as emphysema.  Patient admitted for further management.      Prior cardiac workup  Echo 2024  1. Left ventricle: The cavity size was normal. Wall thickness was normal.      Systolic function was normal. The estimated ejection fraction was 60-65%,      by visual assessment. No diagnostic evidence for regional wall motion      abnormalities. Left ventricular diastolic function parameters were normal      for the patient's age.   2. Right ventricle: The cavity size was increased.   3. Left atrium: The left atrial volume was normal.   4. Right atrium: The atrium was dilated.   5. Aortic valve: The valve was trileaflet. The leaflets were mildly      thickened and mildly calcified.   6. Mitral valve: The leaflets were mildly calcified. Prolapse. There was      moderate regurgitation.   7. Tricuspid valve: There was moderate-severe regurgitation.   8. Pulmonary arteries: Systolic pressure was moderately to markedly      increased, estimated to be 69mm Hg.    Impressions:  This study is compared with previous dated         --------------------------------------------------------------------------------------------------------------------------------  ROS 10 systems reviewed, pertinent findings above.  ROS    History:  Past Medical History:    Appendicitis    APPENDECTOMY    Arthritis    Basal cell carcinoma    Of Face     Chicken pox    COPD (chronic obstructive pulmonary disease) (HCC)    Left arm numbness    Measles    Other and unspecified hyperlipidemia    Pneumonia    Tonsillitis    Unspecified essential hypertension     Past Surgical History:   Procedure Laterality Date    Appendectomy      Cataract extraction Bilateral     Dr. Tirado's office    Glaucoma surgery      Hip replacement surgery      Skin surgery  Excision of Basal cell carcinoma of face     Tonsillectomy      T&A     Family History   Problem Relation Age of Onset    Cancer Father     Hypertension Father     Heart Disease Father     Heart Disease Mother     Gastro-Intestinal Disorder Maternal Grandmother         TUBERCULOSIS    Breast Cancer Maternal Aunt         80s    Neurological Disorder Other         GREAT AUNT, EPILEPSY    Lipids Other         HYPERLIPIDEMIA    Glaucoma Neg     Diabetes Neg       reports that she quit smoking about 34 years ago. Her smoking use included cigarettes. She started smoking about 64 years ago. She has a 30 pack-year smoking history. She has quit using smokeless tobacco. She reports that she does not drink alcohol and does not use drugs.    Objective:   Temp: 97.2 °F (36.2 °C)  Pulse: 88  Resp: 16  BP: 96/68    Intake/Output:     Intake/Output Summary (Last 24 hours) at 5/12/2024 1245  Last data filed at 5/12/2024 0855  Gross per 24 hour   Intake 350 ml   Output 470 ml   Net -120 ml       Physical Exam:     General: Alert and oriented x 3  HEENT: Normocephalic, anicteric sclera, neck supple.  Neck: No JVD, carotids 2+, no bruits.  Cardiac: Regular rate and rhythm. S1,  S2 normal. No murmur, pericardial rub, S3.  Lungs: Clear without wheezes, rales, rhonchi or dullness.  Normal excursions and effort.  Abdomen: Soft, non-tender. BS-present.  Extremities: Without clubbing, cyanosis or edema.  Peripheral pulses are 2+.  Neurologic: Non-focal  Skin: Warm and dry.       Assessment:    Chest tenderness, shortness of breath  Possible pneumonia  Possible heart failure exacerbation  History of pulm hypertension  COPD  Hyponatremia  Hyperkalemia      Plan:  Patient above-mentioned history admitted for management of possible pneumonia.  ED documentation suggest that the patient had chest pain shortness of breath, patient complains of only shoulder pain.  BMP now from prior.  No obvious volume overload on examination.  Can position to oral diuretics tomorrow, dosing depending on labs.  No further testing needed at this time    Thank you for allowing me to take part in the care of Lina LESLEY [place]. Please call with any questions of concerns.      Level of care: C5    Dr. Charles Wagoner,   May 12, 2024  12:45 PM

## 2024-05-12 NOTE — CM/SW NOTE
SW performed chart review,     Found pt was admitted from [facility], pt normally lives at home in [facility].     Anticipated therapy need: Gradual Rehabilitative Therapy    Will need to discuss and finalize with the pt/ niece if plan is to DC back to [facility] once medially cleared     PASRR current    [facility] - sent    Will not need 3 midnights to admit back     PLAN: DC back to [facility] - pending agreement    Phuong Fang, TIARRA, MSW ext. [phone]

## 2024-05-12 NOTE — PHYSICAL THERAPY NOTE
PHYSICAL THERAPY EVALUATION - INPATIENT     Room Number: 335/335-A  Evaluation Date: 5/12/2024  Type of Evaluation: Initial   Physician Order: PT Eval and Treat    Presenting Problem: chest pressure and pain, SOB, COPD exacerbation  Co-Morbidities : 2 recent admissions this month and to rehab; frequent falls, COPD, UTI, ,  Reason for Therapy: Mobility Dysfunction and Discharge Planning    PHYSICAL THERAPY ASSESSMENT   Patient is a 89 year old female admitted 5/11/2024 for chest pain and tightness, difficulty breathing, LLE pain/edema.  Prior to admission patient's baseline is currently requiring assistance for all mobility, in rehab the last 2 weeks. This is her third hospitalization this month. Prior to May 2024 patient was living alone in a two story home and was independent, niece assisted with some IADL.  Patient is currently functioning below baseline with bed mobility, transfers, gait, stair negotiation, maintaining seated position, standing prolonged periods, and performing household tasks.  Patient is requiring moderate assist and maximum assist as a result of the following impairments: decreased functional strength, decreased endurance/aerobic capacity, pain, impaired standing balance, decreased muscular endurance, medical status, and limited BLE ROM.  Physical Therapy will continue to follow for duration of hospitalization. Patient is VERY [place]    Patient will benefit from continued skilled PT Services to promote return to prior level of function and safety with continuous assistance and gradual rehabilitative therapy .    PLAN  PT Treatment Plan: Bed mobility;Endurance;Energy conservation;Patient education;Gait training;Range of motion;Strengthening;Stair training;Transfer training;Balance training  Rehab Potential : Fair  Frequency (Obs): 3-5x/week    PHYSICAL THERAPY MEDICAL/SOCIAL HISTORY   History related to current admission: Lina Dudley is a 89 year old female with history of COPD,  hyperlipidemia, A-fib, HTN, CHF, pHTN, chronic respiratory failure who was sent from nursing home today for evaluation of worsening shortness of breath.  Has no chest pain but notes pain in her left shoulder and hip.  This is the third hospitalization this month.  Discharged 2 days ago.  Treated for HFpEF with inpatient diuresis.  No diuretics at discharge due to low blood pressure, QUITA.  No fever, chills, nausea, vomiting or abdominal pain.        Problem List  Principal Problem:    Chronic respiratory failure, unspecified whether with hypoxia or hypercapnia ([facility])  Active Problems:    Acute on chronic congestive heart failure, unspecified heart failure type (HCC)    COPD exacerbation ([facility])      HOME SITUATION  Home Situation  Type of Home: House  Home Layout: Two level  Stairs to Enter : 4  Railing: Yes  Stairs to Bedroom: 10  Railing: Yes  Lives With: Alone (supportive niece Christina down the street)  Drives: No  Patient Owned Equipment: Rolling walker     Prior Level of [place]: Per chart and pt she normally lives in a two level home alone and is independent with a walker. She has been admitted twice this month prior to this admission and went to rehab.     SUBJECTIVE  \"I am so tired\"    PHYSICAL THERAPY EXAMINATION   OBJECTIVE  Precautions: Bed/chair alarm;Hard of hearing  Fall Risk: High fall risk    WEIGHT BEARING RESTRICTION  Weight Bearing Restriction: None                PAIN ASSESSMENT  Rating: Unable to rate  Location: pain all over; left leg is tender to the touch  Management Techniques: Activity promotion    COGNITION  Overall Cognitive Status:  A&Ox2, VERY [place]    RANGE OF MOTION AND STRENGTH ASSESSMENT  Upper extremity ROM and strength are within functional limits   Lower extremity ROM is within functional limits but limited   Lower extremity strength is within functional limits but limited    BALANCE  Static Sitting: Fair -  Dynamic Sitting: Poor +  Static Standing: Poor  Dynamic Standing: Not  tested    ACTIVITY TOLERANCE  Pulse: 99  Heart Rate Source: Monitor     BP: 98/73  BP Location: Right arm  BP Method: Automatic  Patient Position: Sitting (legs elevated)    O2 WALK  Oxygen Therapy  SPO2% on Oxygen at Rest: 94  At rest oxygen flow (liters per minute): 0.5  SPO2% Ambulation on Oxygen: 90  Ambulation oxygen flow (liters per minute): 1    AM-PAC '6-Clicks' INPATIENT SHORT FORM - BASIC MOBILITY  How much difficulty does the patient currently have...  Patient Difficulty: Turning over in bed (including adjusting bedclothes, sheets and blankets)?: A Little   Patient Difficulty: Sitting down on and standing up from a chair with arms (e.g., wheelchair, bedside commode, etc.): A Lot   Patient Difficulty: Moving from lying on back to sitting on the side of the bed?: A Lot   How much help from another person does the patient currently need...   Help from Another: Moving to and from a bed to a chair (including a wheelchair)?: A Lot   Help from Another: Need to walk in hospital room?: Total   Help from Another: Climbing 3-5 steps with a railing?: Total     AM-PAC Score:  Raw Score: 11   Approx Degree of Impairment: 72.57%   Standardized Score (AM-PAC Scale): 33.86   CMS Modifier (G-Code): CL    FUNCTIONAL ABILITY STATUS  Functional Mobility/Gait Assessment  Gait Assistance: Not tested (pt able to take a few steps to chair, pt with SIGNIFICANT posterior lean)  Rolling: minimal assist  Supine to Sit: maximum assist  Sit to Supine: maximum assist  Sit to Stand: maximum assist    Exercise/Education Provided:  Bed mobility  Energy conservation  Functional activity tolerated  Posture  ROM  Strengthening  Transfer training    RN approves participation in therapy session. Patient presents in bed and VERY [place], agreeable to therapy. She reports feeling legs are heavy and she is weak overall. Noted increased work of breathing even at rest. She is educated in PLB techniques and coordinating this with mobility. She is needing  mod-max A for all mobility and a lot of encouragement to move and get out of bed. She has a significant posterior lean when standing and needs assist to find and maintain balance. She is educated regarding sitting with LE's elevated intermittently and peforming LE exs x 10 reps. All questions answered and needs in reach. She is significantly below her normal independent baseline level    The patient's Approx Degree of Impairment: 72.57% has been calculated based on documentation in the [facility] '6 clicks' Inpatient Basic Mobility Short Form.  Research supports that patients with this level of impairment may benefit from gradual rehab /DAYANNA and this is the recommendation. Final disposition will be made by interdisciplinary medical team.    Patient End of Session: Up in chair;With  staff;Needs met;Call light within reach;RN aware of session/findings;All patient questions and concerns addressed;Other (Comment) (PCT in room and aware chair alarm box is missing, alarm pad is in room)    CURRENT GOALS  Goals to be met by: 5/27/24  Patient Goal Patient's self-stated goal is: to get home   Goal #1 Patient is able to demonstrate supine - sit EOB @ level: minimum assistance     Goal #1   Current Status    Goal #2 Patient is able to demonstrate transfers Sit to/from Stand at assistance level: minimum assistance with walker - rolling     Goal #2  Current Status    Goal #3 Patient is able to ambulate 25 feet with assist device: walker - rolling at assistance level: minimum assistance   Goal #3   Current Status    Goal #4 Patient will negotiate bed to/from chair transfers with RW and minimal assistance   Goal #4   Current Status    Goal #5 Patient to demonstrate independence with home activity/exercise instructions provided to patient in preparation for discharge.   Goal #5   Current Status    Goal #6    Goal #6  Current Status      Patient Evaluation Complexity Level:  History High - 3 or more personal factors and/or  co-morbidities   Examination of body systems Moderate - addressing a total of 3 or more elements   Clinical Presentation  Moderate - Evolving   Clinical Decision Making  Moderate Complexity     Therapeutic Activity:  26 minutes

## 2024-05-12 NOTE — PLAN OF CARE
Patient A&Ox1-2 at baseline. Wears 2L of oxygen at baseline. Nephrology on consult for QUITA. IV lasix given.    Problem: Patient Centered Care  Goal: Patient preferences are identified and integrated in the patient's plan of care  Description: Interventions:  - What would you like us to know as we care for you? I was just here and discharged to rehab  - Provide timely, complete, and accurate information to patient/family  - Incorporate patient and family knowledge, values, beliefs, and cultural backgrounds into the planning and delivery of care  - Encourage patient/family to participate in care and decision-making at the level they choose  - Honor patient and family perspectives and choices  Outcome: Progressing     Problem: Patient/Family Goals  Goal: Patient/Family Long Term Goal  Description: Patient's Long Term Goal: Go home    Interventions:  - PT/OT  - See additional Care Plan goals for specific interventions  Outcome: Progressing  Goal: Patient/Family Short Term Goal  Description: Patient's Short Term Goal: Breathe better    Interventions:   - IV lasix  - Cardiology consult  - Nephrology consult  - See additional Care Plan goals for specific interventions  Outcome: Progressing     Problem: CARDIOVASCULAR - ADULT  Goal: Maintains optimal cardiac output and hemodynamic stability  Description: INTERVENTIONS:  - Monitor vital signs, rhythm, and trends  - Monitor for bleeding, hypotension and signs of decreased cardiac output  - Evaluate effectiveness of vasoactive medications to optimize hemodynamic stability  - Monitor arterial and/or venous puncture sites for bleeding and/or hematoma  - Assess quality of pulses, skin color and temperature  - Assess for signs of decreased coronary artery perfusion - ex. Angina  - Evaluate fluid balance, assess for edema, trend weights  Outcome: Progressing  Goal: Absence of cardiac arrhythmias or at baseline  Description: INTERVENTIONS:  - Continuous cardiac monitoring, monitor  vital signs, obtain 12 lead EKG if indicated  - Evaluate effectiveness of antiarrhythmic and heart rate control medications as ordered  - Initiate emergency measures for life threatening arrhythmias  - Monitor electrolytes and administer replacement therapy as ordered  Outcome: Progressing     Problem: RESPIRATORY - ADULT  Goal: Achieves optimal ventilation and oxygenation  Description: INTERVENTIONS:  - Assess for changes in respiratory status  - Assess for changes in mentation and behavior  - Position to facilitate oxygenation and minimize respiratory effort  - Oxygen supplementation based on oxygen saturation or ABGs  - Provide Smoking Cessation handout, if applicable  - Encourage broncho-pulmonary hygiene including cough, deep breathe, Incentive Spirometry  - Assess the need for suctioning and perform as needed  - Assess and instruct to report SOB or any respiratory difficulty  - Respiratory Therapy support as indicated  - Manage/alleviate anxiety  - Monitor for signs/symptoms of CO2 retention  Outcome: Progressing     Problem: GENITOURINARY - ADULT  Goal: Absence of urinary retention  Description: INTERVENTIONS:  - Assess patient’s ability to void and empty bladder  - Monitor intake/output and perform bladder scan as needed  - Follow urinary retention protocol/standard of care  - Consider collaborating with pharmacy to review patient's medication profile  - Implement strategies to promote bladder emptying  Outcome: Progressing     Problem: METABOLIC/FLUID AND ELECTROLYTES - ADULT  Goal: Glucose maintained within prescribed range  Description: INTERVENTIONS:  - Monitor Blood Glucose as ordered  - Assess for signs and symptoms of hyperglycemia and hypoglycemia  - Administer ordered medications to maintain glucose within target range  - Assess barriers to adequate nutritional intake and initiate nutrition consult as needed  - Instruct patient on self management of diabetes  Outcome: Progressing  Goal: Electrolytes  maintained within normal limits  Description: INTERVENTIONS:  - Monitor labs and rhythm and assess patient for signs and symptoms of electrolyte imbalances  - Administer electrolyte replacement as ordered  - Monitor response to electrolyte replacements, including rhythm and repeat lab results as appropriate  - Fluid restriction as ordered  - Instruct patient on fluid and nutrition restrictions as appropriate  Outcome: Progressing     Problem: SKIN/TISSUE INTEGRITY - ADULT  Goal: Skin integrity remains intact  Description: INTERVENTIONS  - Assess and document risk factors for pressure ulcer development  - Assess and document skin integrity  - Monitor for areas of redness and/or skin breakdown  - Initiate interventions, skin care algorithm/standards of care as needed  Outcome: Progressing     Problem: HEMATOLOGIC - ADULT  Goal: Maintains hematologic stability  Description: INTERVENTIONS  - Assess for signs and symptoms of bleeding or hemorrhage  - Monitor labs and vital signs for trends  - Administer supportive blood products/factors, fluids and medications as ordered and appropriate  - Administer supportive blood products/factors as ordered and appropriate  Outcome: Progressing     Problem: MUSCULOSKELETAL - ADULT  Goal: Return mobility to safest level of function  Description: INTERVENTIONS:  - Assess patient stability and activity tolerance for standing, transferring and ambulating w/ or w/o assistive devices  - Assist with transfers and ambulation using safe patient handling equipment as needed  - Ensure adequate protection for wounds/incisions during mobilization  - Obtain PT/OT consults as needed  - Advance activity as appropriate  - Communicate ordered activity level and limitations with patient/family  Outcome: Progressing

## 2024-05-13 PROBLEM — E87.1 HYPONATREMIA: Status: ACTIVE | Noted: 2024-01-01

## 2024-05-13 PROBLEM — E87.1 HYPONATREMIA: Status: ACTIVE | Noted: 2024-05-13

## 2024-05-13 NOTE — HOSPICE RN NOTE
Hospice referral received. Estrella shah. [facility] will meet with family tomorrow at 1000 for informational meeting.    Kimberly Headley RN, BSN  Transitional Nurse Liaison  [facility]  [phone] (After-hours)

## 2024-05-13 NOTE — PLAN OF CARE
Pt alert and oriented x2. Pt on 1.5 L of oxygen. PRN tylenol given for pain. Pt up to chair with sling. Primary RN and pct providing turning and incontinence care. Palliative saw pt; plan for hospice meeting tomorrow.   Problem: Patient Centered Care  Goal: Patient preferences are identified and integrated in the patient's plan of care  Description: Interventions:  - What would you like us to know as we care for you? I came from [facility]  - Provide timely, complete, and accurate information to patient/family  - Incorporate patient and family knowledge, values, beliefs, and cultural backgrounds into the planning and delivery of care  - Encourage patient/family to participate in care and decision-making at the level they choose  - Honor patient and family perspectives and choices  Outcome: Progressing     Problem: Patient/Family Goals  Goal: Patient/Family Long Term Goal  Description: Patient's Long Term Goal: to stop being short of breath    Interventions:  - IV lasix, renal ultrasound  - See additional Care Plan goals for specific interventions  Outcome: Progressing  Goal: Patient/Family Short Term Goal  Description: Patient's Short Term Goal: to stop having pain in my legs    Interventions:   - monitor vitals  - Q 2 turn  - wean oxygen back to baseline  - See additional Care Plan goals for specific interventions  Outcome: Progressing     Problem: CARDIOVASCULAR - ADULT  Goal: Maintains optimal cardiac output and hemodynamic stability  Description: INTERVENTIONS:  - Monitor vital signs, rhythm, and trends  - Monitor for bleeding, hypotension and signs of decreased cardiac output  - Evaluate effectiveness of vasoactive medications to optimize hemodynamic stability  - Monitor arterial and/or venous puncture sites for bleeding and/or hematoma  - Assess quality of pulses, skin color and temperature  - Assess for signs of decreased coronary artery perfusion - ex. Angina  - Evaluate fluid balance, assess for edema,  trend weights  Outcome: Progressing  Goal: Absence of cardiac arrhythmias or at baseline  Description: INTERVENTIONS:  - Continuous cardiac monitoring, monitor vital signs, obtain 12 lead EKG if indicated  - Evaluate effectiveness of antiarrhythmic and heart rate control medications as ordered  - Initiate emergency measures for life threatening arrhythmias  - Monitor electrolytes and administer replacement therapy as ordered  Outcome: Progressing     Problem: RESPIRATORY - ADULT  Goal: Achieves optimal ventilation and oxygenation  Description: INTERVENTIONS:  - Assess for changes in respiratory status  - Assess for changes in mentation and behavior  - Position to facilitate oxygenation and minimize respiratory effort  - Oxygen supplementation based on oxygen saturation or ABGs  - Provide Smoking Cessation handout, if applicable  - Encourage broncho-pulmonary hygiene including cough, deep breathe, Incentive Spirometry  - Assess the need for suctioning and perform as needed  - Assess and instruct to report SOB or any respiratory difficulty  - Respiratory Therapy support as indicated  - Manage/alleviate anxiety  - Monitor for signs/symptoms of CO2 retention  Outcome: Progressing     Problem: GENITOURINARY - ADULT  Goal: Absence of urinary retention  Description: INTERVENTIONS:  - Assess patient’s ability to void and empty bladder  - Monitor intake/output and perform bladder scan as needed  - Follow urinary retention protocol/standard of care  - Consider collaborating with pharmacy to review patient's medication profile  - Implement strategies to promote bladder emptying  Outcome: Progressing     Problem: METABOLIC/FLUID AND ELECTROLYTES - ADULT  Goal: Glucose maintained within prescribed range  Description: INTERVENTIONS:  - Monitor Blood Glucose as ordered  - Assess for signs and symptoms of hyperglycemia and hypoglycemia  - Administer ordered medications to maintain glucose within target range  - Assess barriers to  adequate nutritional intake and initiate nutrition consult as needed  - Instruct patient on self management of diabetes  Outcome: Progressing  Goal: Electrolytes maintained within normal limits  Description: INTERVENTIONS:  - Monitor labs and rhythm and assess patient for signs and symptoms of electrolyte imbalances  - Administer electrolyte replacement as ordered  - Monitor response to electrolyte replacements, including rhythm and repeat lab results as appropriate  - Fluid restriction as ordered  - Instruct patient on fluid and nutrition restrictions as appropriate  Outcome: Progressing     Problem: SKIN/TISSUE INTEGRITY - ADULT  Goal: Skin integrity remains intact  Description: INTERVENTIONS  - Assess and document risk factors for pressure ulcer development  - Assess and document skin integrity  - Monitor for areas of redness and/or skin breakdown  - Initiate interventions, skin care algorithm/standards of care as needed  Outcome: Progressing     Problem: HEMATOLOGIC - ADULT  Goal: Maintains hematologic stability  Description: INTERVENTIONS  - Assess for signs and symptoms of bleeding or hemorrhage  - Monitor labs and vital signs for trends  - Administer supportive blood products/factors, fluids and medications as ordered and appropriate  - Administer supportive blood products/factors as ordered and appropriate  Outcome: Progressing     Problem: MUSCULOSKELETAL - ADULT  Goal: Return mobility to safest level of function  Description: INTERVENTIONS:  - Assess patient stability and activity tolerance for standing, transferring and ambulating w/ or w/o assistive devices  - Assist with transfers and ambulation using safe patient handling equipment as needed  - Ensure adequate protection for wounds/incisions during mobilization  - Obtain PT/OT consults as needed  - Advance activity as appropriate  - Communicate ordered activity level and limitations with patient/family  Outcome: Progressing

## 2024-05-13 NOTE — CM/SW NOTE
Social work received a message from the Palliative Care APRN stating that the patient's niece might want to take her home with hospice and caregiver support.      Social work sent referral in [facility] to [facility].    [facility] will meet with the family tomorrow.    AMBER/GARRETT to remain available for support and/or discharge planning.     Gemini Stephen MSW, LSW  Discharge Planner [id number]

## 2024-05-13 NOTE — CONGREGATE LIVING REVIEW
ANTICOAGULATION FOLLOW-UP CLINIC VISIT    Patient Name:  Keith Desir  Date:  2020  Contact Type:  Telephone    SUBJECTIVE:  Patient Findings         Clinical Outcomes     Negatives:   Major bleeding event, Thromboembolic event, Anticoagulation-related hospital admission, Anticoagulation-related ED visit, Anticoagulation-related fatality           OBJECTIVE    Recent labs: (last 7 days)     20  0922   INR 2.00*       ASSESSMENT / PLAN  INR assessment THER    Recheck INR In: 6 WEEKS    INR Location Clinic      Anticoagulation Summary  As of 2020    INR goal:   2.0-3.0   TTR:   79.4 % (1 y)   INR used for dosin.00 (2020)   Warfarin maintenance plan:   5 mg (2.5 mg x 2) every Tue, Sat; 7.5 mg (2.5 mg x 3) all other days   Full warfarin instructions:   5 mg every Tue, Sat; 7.5 mg all other days   Weekly warfarin total:   47.5 mg   No change documented:   Dot Ziegler RN   Plan last modified:   Dot Ziegler RN (2020)   Next INR check:   2020   Priority:   Maintenance   Target end date:       Indications    Long term current use of anticoagulant therapy [Z79.01]  Chronic atrial fibrillation (HCC) [I48.20]             Anticoagulation Episode Summary     INR check location:       Preferred lab:       Send INR reminders to:   AJ MCINTOSH    Comments:   don't print AVS unless change in dosing      Anticoagulation Care Providers     Provider Role Specialty Phone number    Cayla Gregorio MD [facility] [phone]            See the Encounter Report to view Anticoagulation Flowsheet and Dosing Calendar (Go to Encounters tab in chart review, and find the Anticoagulation Therapy Visit)    Therapeutic INR 2.0. Will continue maintenance dose and recheck in 6 week(s).    Wife states negative for signs and symptoms of bleeding or blood clots, changes in medication, changes in diet, any signs of infection or antibiotic use, anything new OTC or  [facility] Authorization    The [facility] Review Committee has reviewed this case and the patient IS APPROVED for discharge to a facility for Short Term Skilled once the following procedure is followed:     - The physician discharge instructions (contained within the ZARA note for SNF) must inlcude the below appropriate and approved COVID instructions to the facility    For questions regarding CLRC approval process, please contact the CM assigned to the case.  For questions regarding RN discharge workflow, please contact the unit Clinical Leader.   herbal medications, any missed or extra doses of the warfarin.    Wife informed of the symptoms to be seen for either by INR nurse or ER.    Patient ask if refill of warfarin is needed. Rx sent no    Dot Ziegler RN

## 2024-05-13 NOTE — PLAN OF CARE
Problem: Patient Centered Care  Goal: Patient preferences are identified and integrated in the patient's plan of care  Description: Interventions:  - What would you like us to know as we care for you? I came from [facility]  - Provide timely, complete, and accurate information to patient/family  - Incorporate patient and family knowledge, values, beliefs, and cultural backgrounds into the planning and delivery of care  - Encourage patient/family to participate in care and decision-making at the level they choose  - Honor patient and family perspectives and choices  Outcome: Progressing     Problem: Patient/Family Goals  Goal: Patient/Family Long Term Goal  Description: Patient's Long Term Goal: to stop being short of breath    Interventions:  - IV lasix, renal ultrasound  - See additional Care Plan goals for specific interventions  Outcome: Progressing  Goal: Patient/Family Short Term Goal  Description: Patient's Short Term Goal: to stop having pain in my legs    Interventions:   - monitor vitals  - Q 2 turn  - wean oxygen back to baseline  - See additional Care Plan goals for specific interventions  Outcome: Progressing     Problem: CARDIOVASCULAR - ADULT  Goal: Maintains optimal cardiac output and hemodynamic stability  Description: INTERVENTIONS:  - Monitor vital signs, rhythm, and trends  - Monitor for bleeding, hypotension and signs of decreased cardiac output  - Evaluate effectiveness of vasoactive medications to optimize hemodynamic stability  - Monitor arterial and/or venous puncture sites for bleeding and/or hematoma  - Assess quality of pulses, skin color and temperature  - Assess for signs of decreased coronary artery perfusion - ex. Angina  - Evaluate fluid balance, assess for edema, trend weights  Outcome: Progressing  Goal: Absence of cardiac arrhythmias or at baseline  Description: INTERVENTIONS:  - Continuous cardiac monitoring, monitor vital signs, obtain 12 lead EKG if indicated  - Evaluate  effectiveness of antiarrhythmic and heart rate control medications as ordered  - Initiate emergency measures for life threatening arrhythmias  - Monitor electrolytes and administer replacement therapy as ordered  Outcome: Progressing     Problem: RESPIRATORY - ADULT  Goal: Achieves optimal ventilation and oxygenation  Description: INTERVENTIONS:  - Assess for changes in respiratory status  - Assess for changes in mentation and behavior  - Position to facilitate oxygenation and minimize respiratory effort  - Oxygen supplementation based on oxygen saturation or ABGs  - Provide Smoking Cessation handout, if applicable  - Encourage broncho-pulmonary hygiene including cough, deep breathe, Incentive Spirometry  - Assess the need for suctioning and perform as needed  - Assess and instruct to report SOB or any respiratory difficulty  - Respiratory Therapy support as indicated  - Manage/alleviate anxiety  - Monitor for signs/symptoms of CO2 retention  Outcome: Progressing     Problem: GENITOURINARY - ADULT  Goal: Absence of urinary retention  Description: INTERVENTIONS:  - Assess patient’s ability to void and empty bladder  - Monitor intake/output and perform bladder scan as needed  - Follow urinary retention protocol/standard of care  - Consider collaborating with pharmacy to review patient's medication profile  - Implement strategies to promote bladder emptying  Outcome: Progressing     Problem: METABOLIC/FLUID AND ELECTROLYTES - ADULT  Goal: Glucose maintained within prescribed range  Description: INTERVENTIONS:  - Monitor Blood Glucose as ordered  - Assess for signs and symptoms of hyperglycemia and hypoglycemia  - Administer ordered medications to maintain glucose within target range  - Assess barriers to adequate nutritional intake and initiate nutrition consult as needed  - Instruct patient on self management of diabetes  Outcome: Progressing  Goal: Electrolytes maintained within normal limits  Description:  INTERVENTIONS:  - Monitor labs and rhythm and assess patient for signs and symptoms of electrolyte imbalances  - Administer electrolyte replacement as ordered  - Monitor response to electrolyte replacements, including rhythm and repeat lab results as appropriate  - Fluid restriction as ordered  - Instruct patient on fluid and nutrition restrictions as appropriate  Outcome: Progressing     Problem: SKIN/TISSUE INTEGRITY - ADULT  Goal: Skin integrity remains intact  Description: INTERVENTIONS  - Assess and document risk factors for pressure ulcer development  - Assess and document skin integrity  - Monitor for areas of redness and/or skin breakdown  - Initiate interventions, skin care algorithm/standards of care as needed  Outcome: Progressing     Problem: HEMATOLOGIC - ADULT  Goal: Maintains hematologic stability  Description: INTERVENTIONS  - Assess for signs and symptoms of bleeding or hemorrhage  - Monitor labs and vital signs for trends  - Administer supportive blood products/factors, fluids and medications as ordered and appropriate  - Administer supportive blood products/factors as ordered and appropriate  Outcome: Progressing     Problem: MUSCULOSKELETAL - ADULT  Goal: Return mobility to safest level of function  Description: INTERVENTIONS:  - Assess patient stability and activity tolerance for standing, transferring and ambulating w/ or w/o assistive devices  - Assist with transfers and ambulation using safe patient handling equipment as needed  - Ensure adequate protection for wounds/incisions during mobilization  - Obtain PT/OT consults as needed  - Advance activity as appropriate  - Communicate ordered activity level and limitations with patient/family  Outcome: Progressing     Patient A&Ox1-2 on 2L/NC. Potassium redrapina 5.4, MD notified and 2x lokelma ordered. Tylenol given prn pain. To start PO lasix today. Safety precautions maintained, call light and personal belongings within reach. Plan is for back to  [facility] when medically cleared.

## 2024-05-13 NOTE — PROGRESS NOTES
[facility]  part of [facility]    Progress Note    Lina Dudley Patient Status:  Observation    1926 MRN [id number]   Location [facility] 3W/SW Attending Mayi Gaxiola MD   Hosp Day # 0 PCP ROBYN KULKARNI MD     Chief complaint: sob  Subjective:     Very short of breath, not feeling well, very frail  Poor appetite  Very hard of hearing  BP low    Objective:   Blood pressure 99/83, pulse 79, temperature 97 °F (36.1 °C), temperature source Oral, resp. rate 16, height 5' 7\" (1.702 m), weight 121 lb 6.4 oz (55.1 kg), SpO2 96%, not currently breastfeeding.    GEN: Looks ill, frail, dyspneic  HEENT: conjunctivae/corneas clear. PERRL, EOM's intact.  Neck: no adenopathy, no carotid bruit, supple  Pulmonary: Diminished, coarse to auscultation bilaterally  Cardiovascular: Irregular  Abdominal: soft, non-tender; bowel sounds normal;   Extremities: no cyanosis, + mild pitting edema  Pulses: palpable and symmetric  Skin: Warm and dry, skin thin, healing bruises  Neurologic: Alert and oriented X 3, hard of hearing, generalized weakness, moves all extremities  Psychiatric: calm, cooperative    Assessment and Plan:   Shortness of breath  CHF exacerbation  COPD exacerbation  Pulmonary hypertension  Possible pneumonia with leukocytosis, slightly elevated procal ~0.18  Chronic respiratory failure, currently at baseline oxygen 2 L  Overall deconditioning, frailty likely contributing to severe dyspnea   -tele  -Gentle diuresis as blood pressure tolerates  -BNP improved when compared to prior  -Telemetry  -Cardiology has been consulted, follow recommendations  -Steroids, nebs, antibiotics/azithromycin  -Diuresis limited by low blood pressure and elevated creatinine  -Nephrology consult to help with volume status, d/w Dr. Gomez     Atrial fibrillation, noted last admission  -Sinus rhythm currently  -Telemetry  -Not candidate for anticoagulation given recurrent falls     Renal  insufficiency/QUITA  Hyponatremia  Hyperkalemia  -Creatinine stable compared to recent admission  -Monitor on diuretics     Leukocytosis  -Procalcitonin minimally elevated  -No obvious sign/symptom of infection     Mild hyperkalemia  -Received Lasix in the ED  -Monitor     Hyperlipidemia  HTN  -Resume home medications      DVT prophylaxis: heparin subcutaneous    Social: from rehab    DNAR/select, pt and her niece Christina met with Rand/palliative care/ during the last admission 5/3     Dispo: pending       Chart reviewed, including current vitals, notes, labs and imaging  Pertinent past medical records reviewed  Labs ordered and medications adjusted as outlined above  Coordinate care with care team/consultants  Discussed with patient and family at bedside results of tests, management plan as outlined above, and the need for ongoing hospitalization  D/w RN     MDM high  severe acute illness/or exacerbation of chronic illness posing a threat to life, IV medications, requiring close monitoring in hospital.       Results:     Lab Results   Component Value Date    WBC 13.2 (H) 05/11/2024    HGB 14.3 05/11/2024    HCT 43.7 05/11/2024    .0 05/11/2024    CREATSERUM 1.50 (H) 05/12/2024    BUN 41 (H) 05/12/2024     (L) 05/12/2024    K 5.5 (H) 05/12/2024    CL 98 05/12/2024    CO2 19.0 (L) 05/12/2024     (H) 05/12/2024    CA 8.4 (L) 05/12/2024    ALB 3.5 05/12/2024    ALKPHO 195 (H) 05/11/2024    BILT 1.2 (H) 05/11/2024    TP 5.9 05/11/2024    AST 36 (H) 05/11/2024    ALT 18 05/11/2024    TSH 4.707 05/07/2024    MG 2.9 (H) 05/09/2024    PHOS 4.7 05/12/2024    CK 65 05/07/2024       XR CHEST AP PORTABLE  (CPT=71045)    Result Date: 5/11/2024  CONCLUSION:  1. CHF/fluid overload superimposed on emphysema.  2. Slight loculation of small bibasilar pleural effusions versus small pleural effusions with pleural scarring.  No significant change.    Dictated by (CST): Hayes Bauer MD on 5/11/2024 at 7:06 PM      Finalized by (CST): Hayes Bauer MD on 5/11/2024 at 7:09 PM         EKG 12 Lead    Result Date: 5/12/2024  Sinus rhythm with Premature atrial complexes Minimal voltage criteria for LVH, may be normal variant ( Bipin product ) Borderline ECG When compared with ECG of 08-MAY-2024 14:26, Fewer PACs Confirmed by DO Remy Asif ([id number]) on 5/12/2024 1:38:14 PM       DENVER GONZALEZ MD  5/12/2024

## 2024-05-13 NOTE — RESPIRATORY THERAPY NOTE
1205: Patient instructed on the use of Acapella. Patient unable to follow instructions and demonstrated poor effort. Clear and diminished breath sounds heard bilaterally.

## 2024-05-13 NOTE — PROGRESS NOTES
[facility]  part of [facility]    Progress Note    Lina Dudley Patient Status:  Observation    1926 MRN [id number]   Location [facility] 3W/SW Attending Mayi Gaxiola MD   Hosp Day # 0 PCP ROBYN KULKARNI MD     Chief complaint: sob  Subjective:     Very short of breath, not feeling well, very frail  Poor appetite  Very hard of hearing  BP low    Objective:   Blood pressure 98/74, pulse 88, temperature 97.2 °F (36.2 °C), temperature source Axillary, resp. rate 16, height 5' 7\" (1.702 m), weight 122 lb 8 oz (55.6 kg), SpO2 98%, not currently breastfeeding.    GEN: Looks ill, frail, dyspneic  HEENT: conjunctivae/corneas clear. PERRL, EOM's intact.  Neck: no adenopathy, no carotid bruit, supple  Pulmonary: Diminished, coarse to auscultation bilaterally  Cardiovascular: Irregular  Abdominal: soft, non-tender; bowel sounds normal;   Extremities: no cyanosis, + mild pitting edema  Pulses: palpable and symmetric  Skin: Warm and dry, skin thin, healing bruises  Neurologic: Alert and oriented X 3, hard of hearing, generalized weakness, moves all extremities  Psychiatric: calm, cooperative    Assessment and Plan:   Shortness of breath, severe, multifactorial, out of proportion to level of hypoxemia, on 2 L  CHF exacerbation  COPD exacerbation  Pulmonary hypertension  Possible pneumonia with leukocytosis, slightly elevated procal ~0.18   Chronic respiratory failure, currently at baseline oxygen 2 L  Overall deconditioning, frailty likely contributing to severe dyspnea   -tele  -Gentle diuresis as blood pressure tolerates  -BNP improved when compared to prior, although high  -Telemetry  -Cardiology has been consulted, follow recommendations  -Steroids, nebs, antibiotics/azithromycin  -Diuresis limited by low blood pressure and elevated creatinine  -Nephrology consulted to help with volume status, d/w Dr. Gomez  -started on midodrine  -recheck CXR  reviewed-->  1. Probable  mild congestive failure with small bilateral pleural effusions.  Approximate 4.3 cm ovoid focus of nodularity at the periphery of the left lung base is unchanged since chest radiograph from 2 days prior and could relate to loculation of   pleural fluid, infection/pneumonia, or even a pleural based mass.  Suggest either radiographic follow-up to resolution or chest CT correlation for complete evaluation.   2. Hyperinflation.  Stable elevation of the left hemidiaphragm.  Stable probable bibasilar atelectasis/scarring.   -add acapella, if able to participate  -scheduled nebs  -overall prognosis poor     Atrial fibrillation, noted last admission  -Sinus rhythm currently  -Telemetry  -Not candidate for anticoagulation given recurrent falls  -metoprolol held     Renal insufficiency/QUITA  Hyponatremia  Hyperkalemia  -Creatinine about the same initially compared to recent admission  -now trending up  -Monitor on diuretics  -nephrology consulted  -check UA, lytes, osmolality  -check renal US-->  1. Kidneys are mildly atrophic bilaterally but normal in echotexture without hydronephrosis or perinephric fluid.   2. Small simple parapelvic renal cysts bilaterally.   3. Unremarkable appearance of the bladder.      Leukocytosis  -Procalcitonin minimally elevated  -No obvious sign/symptom of infection except possibly respiratory     Mild hyperkalemia  -Received Lasix in the ED  -s/p lokelma on 5/12  -Monitor     Hyperlipidemia  HTN  -Resume home medications      DVT prophylaxis: heparin subcutaneous    Social: from rehab    DNAR/select, pt and her niece Christina met with Rand/palliative care/ during the last admission 5/3, will consult     Dispo: pending       Chart reviewed, including current vitals, notes, labs and imaging  Pertinent past medical records reviewed  Labs ordered and medications adjusted as outlined above  Coordinate care with care team/consultants  Discussed with patient the results of tests, management plan as  outlined above, and the need for ongoing hospitalization  D/w RN     [facility] high  severe acute illness/or exacerbation of chronic illness posing a threat to life, IV medications, requiring close monitoring in hospital.       Results:     Lab Results   Component Value Date    WBC 8.7 05/13/2024    HGB 14.4 05/13/2024    HCT 42.5 05/13/2024    .0 05/13/2024    CREATSERUM 1.77 (H) 05/13/2024    BUN 56 (H) 05/13/2024     (L) 05/13/2024    K 5.4 (H) 05/13/2024    CL 94 (L) 05/13/2024    CO2 24.0 05/13/2024     (H) 05/13/2024    CA 8.5 (L) 05/13/2024    ALB 3.5 05/13/2024    ALKPHO 195 (H) 05/11/2024    BILT 1.2 (H) 05/11/2024    TP 5.9 05/11/2024    AST 36 (H) 05/11/2024    ALT 18 05/11/2024    TSH 4.707 05/07/2024    MG 2.9 (H) 05/09/2024    PHOS 5.1 05/13/2024    CK 65 05/07/2024       XR CHEST AP PORTABLE  (CPT=71045)    Result Date: 5/13/2024  CONCLUSION:  1. Probable mild congestive failure with small bilateral pleural effusions.  Approximate 4.3 cm ovoid focus of nodularity at the periphery of the left lung base is unchanged since chest radiograph from 2 days prior and could relate to loculation of pleural fluid, infection/pneumonia, or even a pleural based mass.  Suggest either radiographic follow-up to resolution or chest CT correlation for complete evaluation. 2. Hyperinflation.  Stable elevation of the left hemidiaphragm.  Stable probable bibasilar atelectasis/scarring.    elm-remote  Dictated by (CST): Rolando Spangler MD on 5/13/2024 at 9:02 AM     Finalized by (CST): Rolando Spangler MD on 5/13/2024 at 9:07 AM          XR CHEST AP PORTABLE  (CPT=71045)    Result Date: 5/11/2024  CONCLUSION:  1. CHF/fluid overload superimposed on emphysema.  2. Slight loculation of small bibasilar pleural effusions versus small pleural effusions with pleural scarring.  No significant change.    Dictated by (CST): Hayes Bauer MD on 5/11/2024 at 7:06 PM     Finalized by (CST): Hayes Bauer MD on 5/11/2024 at  7:09 PM         EKG 12 Lead    Result Date: 5/12/2024  Sinus rhythm with Premature atrial complexes Minimal voltage criteria for LVH, may be normal variant ( Bipin product ) Borderline ECG When compared with ECG of 08-MAY-2024 14:26, Fewer PACs Confirmed by DO Remy Asif ([id number]) on 5/12/2024 1:38:14 PM       DENVER GONZALEZ MD  5/13/2024

## 2024-05-13 NOTE — CM/SW NOTE
Social work was able to speak to the patient's niece Christina this morning regarding returning to [facility].    The patient's niece confirmed that she would like the patient to return there once she is medically cleared.    Return referral is sent/reserved.    SW/CM to remain available for support and/or discharge planning.     Gemini Stephen MSW, LSW  Discharge Planner [id number]

## 2024-05-13 NOTE — CONSULTS
[facility]  part of [facility]  Palliative Care Initial Consult Note    Lina Dudley Patient Status:  Observation    1926 MRN [id number]   Location [facility] 3W/SW Attending Mayi Gaxiola MD   Hosp Day # 0 PCP ROBYN KULKARNI MD     Date of Consult: 2024  Patient seen at: [facility]    The  Cures Act makes medical notes like these available to patients in the interest of transparency. Please be advised this is a medical document. Medical documents are intended to carry relevant information, facts as evident, and the clinical opinion of the practitioner. The medical note is intended as peer to peer communication and may appear blunt or direct. It is written in medical language and may contain abbreviations or verbiage that are unfamiliar.     Reason for Consultation: Consult ordered by:: Dr. Gaxiola for evaluation of Palliative Care needs and Goals of care discussion;Uncontrolled symptoms.    Subjective     History of Present Illness: Lina Dudley is a 89 year old female with history of CHF, COPD with chronic respiratory failure on home O2, AFib, HLD, pulmonary HTN, severe TR, OA, h/o compression fracture who was admitted from [facility] on 2024 for SOB and chest pain. See below for reviewed labs and imaging. Pt was admitted for treatment and evaluation of acute on chronic CHF, COPD, possible pneumonia and QUITA. See below for reviewed imaging.     She is being followed by cardiology and renal services.    Reviewed labs and imaging. History was obtained from [facility] and pt/niece Christina.  Today is day 2 of hospitalization. This is her 3rd hospitalization in the past 1 month.     I had evaluated pt for palliative care during her previous admission and referral was given to [facility] to follow at rehab    Pt is listed as DNAR.DNI-selective in Epic,  and reviewed previously completed POLST which shows wishes for  DNAR/DNI-selective.     Reviewed symptom needs past 24 hrs: Tylenol 500 mg po X3, Melatonin 3 mg po X1    Patient was seen and examined in the chair with her niece Christina and nephew Sky and his wife at bedside. Pt is A&OX2, +forgetful. She appears very frail and reporting fatigue. She tells me she feels lousy, but cannot elaborate other than feeling weak. She reports intermittent dyspnea symptoms with activity, but denies dyspnea at rest currently on nc 1.5L. Denies pain symptoms. Appetite has been decreased, denies abdominal pain, n/v and moved her bowels today. +chronic issues with constipation. Pt up in the chair today using the life.  See physical exam and ROS below.    Review of Systems/Palliative Care symptom needs assessed:   Pertinent items are noted in HPI/below    Fatigue:  Yes  Dyspnea: denies at rest, +chronic JHA   Current O2 therapy: nc 1.5L  Cough: denies  Pain Present: denies  Non-verbal signs of pain present: NO  Appetite: poor  Constipation: +chronic issues, moved her bowels today  Diarrhea: denies  Nausea/Vomiting: denies  Depression: denies  Anxiety: denies    Medical History: obtained from [employer]  Past Medical History:    Appendicitis    APPENDECTOMY    Arthritis    Basal cell carcinoma    Of Face     Chicken pox    COPD (chronic obstructive pulmonary disease) (HCC)    Left arm numbness    Measles    Other and unspecified hyperlipidemia    Pneumonia    Tonsillitis    Unspecified essential hypertension     Past Surgical History:   Procedure Laterality Date    Appendectomy      Cataract extraction Bilateral     Dr. Tirado's office    Glaucoma surgery      Hip replacement surgery      Skin surgery  Excision of Basal cell carcinoma of face     Tonsillectomy      T&A       Family History: obtained from [employer]  Family History   Problem Relation Age of Onset    Cancer Father     Hypertension Father     Heart Disease Father     Heart Disease Mother     Gastro-Intestinal Disorder Maternal Grandmother          TUBERCULOSIS    Breast Cancer Maternal Aunt         80s    Neurological Disorder Other         GREAT AUNT, EPILEPSY    Lipids Other         HYPERLIPIDEMIA    Glaucoma Neg     Diabetes Neg        Palliative Care Social History:   Marital Status: single  Children: none  Living Situation Prior to Admit: [facility] Patient Live Alone: Yes  Is Patient Confused: Yes  Occupational History: Retired,     Substance History:   reports that she quit smoking about 34 years ago. Her smoking use included cigarettes. She started smoking about 64 years ago. She has a 30 pack-year smoking history. She has quit using smokeless tobacco.  reports no history of alcohol use.  reports no history of drug use.  Hx of Substance Use/Abuse: No    Spiritual Assessment:   Worship: Hoahaoism    Allergies:  Allergies   Allergen Reactions    Bacitracin UNKNOWN    Neosporin Original [Neomycin-Bacitracin-Polymyxin] ITCHING    Polymyxin B UNKNOWN       Medications:     Current Facility-Administered Medications:     midodrine (ProAmatine) tab 5 mg, 5 mg, Oral, TID    acetaminophen (Tylenol Extra Strength) tab 500 mg, 500 mg, Oral, Q4H PRN    atorvastatin (Lipitor) tab 10 mg, 10 mg, Oral, Nightly    docusate sodium (Colace) cap 100 mg, 100 mg, Oral, BID    fluticasone furoate-vilanterol (Breo Ellipta) 100-25 MCG/ACT inhaler 1 puff, 1 puff, Inhalation, QAM    ipratropium-albuterol (Duoneb) 0.5-2.5 (3) MG/3ML inhalation solution 3 mL, 3 mL, Nebulization, Q6H PRN    [Held by provider] metoprolol tartrate (Lopressor) partial tab 12.5 mg, 12.5 mg, Oral, 2x Daily(Beta Blocker)    polyethylene glycol (PEG 3350) (Miralax) 17 g oral packet 17 g, 17 g, Oral, Daily PRN    heparin (Porcine) 5000 UNIT/ML injection 5,000 Units, 5,000 Units, Subcutaneous, Q8H GIANCARLO    acetaminophen (Tylenol Extra Strength) tab 500 mg, 500 mg, Oral, Q4H PRN    melatonin tab 3 mg, 3 mg, Oral, Nightly PRN    albuterol (Ventolin) (2.5 MG/3ML) 0.083% nebulizer  solution 2.5 mg, 2.5 mg, Nebulization, Q4H PRN    predniSONE (Deltasone) tab 40 mg, 40 mg, Oral, Daily    [COMPLETED] azithromycin (Zithromax) tab 500 mg, 500 mg, Oral, Once **FOLLOWED BY** azithromycin (Zithromax) tab 250 mg, 250 mg, Oral, Nightly    guaiFENesin (Robitussin) 100 MG/5 ML oral liquid 200 mg, 200 mg, Oral, Q4H PRN    Nutritional status:  BMI: 19 Weight: 122  +Weight loss  Current Appetite: Poor  Dysphagia: denies    Functional Status History:  ADLs: now mostly in bed/chair and needs help with ADL's  Recent Falls: Yes    Palliative Performance Scale:   Prior to admission: 40%  Observed during hospitalization: 40%  % Ambulation Activity Level Self-Care Intake Consciousness   100 Full  Normal  No Disease Full Normal Full   90 Full  Normal  Some Disease Full Normal Full   80 Full  Normal w/effort  Some Disease Full Normal or reduced Full   70 Reduced  Can't Perform Job  Some Disease Full Normal or reduced Full   60 Reduced  Can't Perform Hobby   Significant Disease Occ Assist Normal or reduced Full or confused   50 Mainly sit/lie Can't do any work  Extensive Disease Partial Assist Normal or reduced Full or confused   40 Mainly in bed Can't do any work  Extensive Disease Mainly Assist Normal or reduced Full or confused   30 Bed Bound Can't do any work  Extensive Disease Max Assist  Total Care Reduced  Drowsy/confused   20 Bed Bound Can't do any work  Extensive Disease Max Assist  Total Care Minimal  Drowsy/confused   10 Bed Bound Can't do any work  Extensive Disease Max Assist  Total Care Mouth Care  Drowsy/confused   0 Death        Objective      Vital Signs:  Blood pressure 98/74, pulse 88, temperature 97.2 °F (36.2 °C), temperature source Axillary, resp. rate 16, height 5' 7\" (1.702 m), weight 122 lb 8 oz (55.6 kg), SpO2 98%, not currently breastfeeding.  Body mass index is 19.19 kg/m².  Present Level of pain: 0/10  Non-verbal signs of pain present: No    Physical Exam:  General: Alert and in no  apparent distress. Weak, frail/thin appearing  HEENT: No focal deficits.  Cardiac: Regular rate and rhythm, S1, S2 normal, no murmur, rub or gallop.  Lungs: Diminished breath sounds bilaterally.  Normal excursions and effort.  Abdomen: Soft, non-tender, normal bowel sounds X 4 quadrants, no rebound or guarding  Extremities: Without clubbing, cyanosis. Peripheral pulses are 2+. BLE Edema not present  Neurologic: Alert and oriented X2, follows commands  Skin: Warm and dry.    Hematology:  Lab Results   Component Value Date    WBC 8.7 05/13/2024    HGB 14.4 05/13/2024    HCT 42.5 05/13/2024    .0 05/13/2024       Coags:No results found for: \"PT\", \"INR\", \"PTT\"    Chemistry:  Lab Results   Component Value Date    CREATSERUM 1.77 (H) 05/13/2024    BUN 56 (H) 05/13/2024     (L) 05/13/2024    K 5.4 (H) 05/13/2024    CL 94 (L) 05/13/2024    CO2 24.0 05/13/2024     (H) 05/13/2024    CA 8.5 (L) 05/13/2024    ALB 3.5 05/13/2024    ALKPHO 195 (H) 05/11/2024    BILT 1.2 (H) 05/11/2024    TP 5.9 05/11/2024    AST 36 (H) 05/11/2024    ALT 18 05/11/2024    MG 2.9 (H) 05/09/2024    PHOS 5.1 05/13/2024       Imaging:  XR CHEST AP PORTABLE  (CPT=71045)    Result Date: 5/13/2024  CONCLUSION:  1. Probable mild congestive failure with small bilateral pleural effusions.  Approximate 4.3 cm ovoid focus of nodularity at the periphery of the left lung base is unchanged since chest radiograph from 2 days prior and could relate to loculation of pleural fluid, infection/pneumonia, or even a pleural based mass.  Suggest either radiographic follow-up to resolution or chest CT correlation for complete evaluation. 2. Hyperinflation.  Stable elevation of the left hemidiaphragm.  Stable probable bibasilar atelectasis/scarring.    elm-remote  Dictated by (CST): Rolando Spangler MD on 5/13/2024 at 9:02 AM     Finalized by (CST): Rolando Spangler MD on 5/13/2024 at 9:07 AM          XR CHEST AP PORTABLE  (CPT=71045)    Result Date:  5/11/2024  CONCLUSION:  1. CHF/fluid overload superimposed on emphysema.  2. Slight loculation of small bibasilar pleural effusions versus small pleural effusions with pleural scarring.  No significant change.    Dictated by (CST): Hayes Bauer MD on 5/11/2024 at 7:06 PM     Finalized by (CST): Hayes Bauer MD on 5/11/2024 at 7:09 PM            Summary of GOC Discussion        I discussed reason for palliative care consultation with patient and her family including niece David, nephew Sky and his wife. Christina remembers me from previous admission and I reinforced the benefits of palliative care.     I differentiated the palliative treatment-focus model versus the hospice comfort-focused philosophy of care. I informed the patient/family that having palliative care support does not limit medical treatment options or decisions to those who wish to continue curative or restorative medical therapies. I discussed the benefits of palliative care to include assistance with arising symptom management needs, an extra layer of support, to ensure GOC are respected throughout healthcare continuum, and assist with transition to hospice care when appropriate.      Outpatient/Community Palliative Care Services:  Usually visit once per 4 weeks depending on contracted agency guidelines  Focus on GOC and symptom management   Palliative Care criteria:  Not altered by prognosis   Does not limit curative or restorative therapies      Outpatient Hospice services:  24/7 phone triage services   RN visit one or more times per week depending on need  Home health aid to assist in ADLs/hygiene   Hospice criteria:  Less than six-month prognosis   Must forego most life-prolonging  measures/treatments   Focus solely on comfort   Must sign onto hospice benefit with agency       Prognostic awareness/understanding: Good    -I  discussed current clinical condition and explored previous discussions with MDs.    -I discussed the normal disease  trajectory of COPD chronic respiratory failure, CHF with associated symptoms and decline over time.     Hopes/goals/concerns:   -Pt/ Christina tell me she was not making much progress at rehab and we discussed her recurrent hospitalizations this month. I discussed she will continue to be at risk for hospitalizations d/t co-morbidities and pt prefers to avoid this.    -Christina is concerned pt may not be able to do much rehab at this point and pt doesn't like being in a facility. Pt prefers to be at her own home on dc. I discussed given pt's current nursing needs going home would only be possible if she has CG support which Christina agrees.  I talked with them about options for HH with community palliative care vs comfort care with hospice. Provided basic overview of Medicare hospice benefit. They are agreeable to hospice informational meeting with [facility] tomorrow at 10a.     -Confirmed wishes for DNAR/DNI and continue with conservative medical management while hospitalized. Pt prefers to avoid future re-hospitalizations. They are agreeable to palliative care following.     -Provided emotional support to pt/family who are coping adequately.     Hospitalization:  Pt prefers to avoid future re-hospitalizations    Procedures:  No intubation    Assessment and Recommendations      Problem List:    CHF exacerbation  COPD exacerbation  Possible pneumonia  Chronic respiratory failure  Pulmonary HTN/severe TR  QUITA  Hyponatremia  Hyperkalemia  Afib  HLD  HTN  Recent fall  Weakness    Goals of care counseling  -see above for details  -Confirmed wishes for DNAR/DNI and continue conservative medical management while hospitalized. Pt prefers to avoid future re-hospitalizations.  -[facility] to provide informational session tomorrow at 10a for possible home hospice care.  -Ongoing GOC discussions may be needed pending clinical course.  -Agreeable to palliative care following  -Dispo:  Prefers to return home with CG support and   possible hospice care pending meeting outcome. AMBER to help with dc planning.   -Provided emotional support to pt/family who are coping adequately.    Advance care planning  -see above for details  -Pt's niece Christina Pike is [facility] #[phone]  -Previously completed POLST form on file in EPIC.     Palliative Performance Scale 40%    Emotion support provided to patient/family today: Yes    A total of 55 mins were spent on this consult, which included all of the following:direct face to face contact, history taking, physical examination, and >50% was spent counseling and coordinating care.    Discussed today's visit with message to Dr. Gaxiola, Lauryn CLARK, Sabiha [facility] hospice RN and Summer RN .    I will continue to follow clinically.    Thank you for allowing Palliative Care services to participate in the care of Ms. Lina Dudley.       Rand Owen, ANP-BC, [facility] [id number]  5/13/2024  2:10 PM  Palliative Care Services

## 2024-05-13 NOTE — PROGRESS NOTES
[facility]  part of [facility]    Progress Note      Subjective:     Patient lying comfortably.  Denies any chest pain.  Shortness of breath stable.  No abdominal pain or nausea vomiting      Review of Systems:     Patient currently on 2 L nasal cannula.  Limited review of system    Objective:   Temp:  [97.2 °F (36.2 °C)-98.3 °F (36.8 °C)] 97.2 °F (36.2 °C)  Pulse:  [88-94] 89  Resp:  [16-18] 16  BP: ()/(63-79) 90/63  SpO2:  [95 %-98 %] 98 %  SpO2: 98 %     Intake/Output Summary (Last 24 hours) at 5/13/2024 1632  Last data filed at 5/13/2024 0900  Gross per 24 hour   Intake 810 ml   Output 450 ml   Net 360 ml     Wt Readings from Last 3 Encounters:   05/13/24 122 lb 8 oz (55.6 kg)   05/09/24 113 lb 3.2 oz (51.3 kg)   05/04/24 113 lb 8 oz (51.5 kg)       General appearance: Awake and alert.  Currently on 2 L nasal cannula.  Mild short of breath  Head: Normocephalic, atraumatic  Eyes: conjunctivae/corneas clear  Throat: lips, mucosa, and tongue normal; teeth and gums normal  Neck:  no JVD, supple,  Extremities: extremities normal, no edema  Skin: No rashes or lesions  Neurologic: Grossly normal  Psychiatric: calm    Medications:  Current Facility-Administered Medications   Medication Dose Route Frequency    midodrine (ProAmatine) tab 5 mg  5 mg Oral TID    acetaminophen (Tylenol Extra Strength) tab 500 mg  500 mg Oral Q4H PRN    atorvastatin (Lipitor) tab 10 mg  10 mg Oral Nightly    docusate sodium (Colace) cap 100 mg  100 mg Oral BID    fluticasone furoate-vilanterol (Breo Ellipta) 100-25 MCG/ACT inhaler 1 puff  1 puff Inhalation QAM    ipratropium-albuterol (Duoneb) 0.5-2.5 (3) MG/3ML inhalation solution 3 mL  3 mL Nebulization Q6H PRN    metoprolol tartrate (Lopressor) partial tab 12.5 mg  12.5 mg Oral 2x Daily(Beta Blocker)    polyethylene glycol (PEG 3350) (Miralax) 17 g oral packet 17 g  17 g Oral Daily PRN    heparin (Porcine) 5000 UNIT/ML injection 5,000 Units  5,000 Units  Subcutaneous Q8H GIANCARLO    acetaminophen (Tylenol Extra Strength) tab 500 mg  500 mg Oral Q4H PRN    melatonin tab 3 mg  3 mg Oral Nightly PRN    albuterol (Ventolin) (2.5 MG/3ML) 0.083% nebulizer solution 2.5 mg  2.5 mg Nebulization Q4H PRN    predniSONE (Deltasone) tab 40 mg  40 mg Oral Daily    azithromycin (Zithromax) tab 250 mg  250 mg Oral Nightly    guaiFENesin (Robitussin) 100 MG/5 ML oral liquid 200 mg  200 mg Oral Q4H PRN              Results:     Recent Labs   Lab 05/09/24  0606 05/11/24  1836 05/13/24  0607   RBC 4.44 4.45 4.28   HGB 14.6 14.3 14.4   HCT 43.7 43.7 42.5   MCV 98.4 98.2 99.3   NEPRELIM 8.53* 10.35* 7.10   WBC 10.5 13.2* 8.7   .0 171.0 188.0     Recent Labs   Lab 05/06/24  1949 05/07/24  0117 05/11/24  1836 05/12/24  1606 05/12/24  2226 05/13/24  0607   GLU 89   < > 118* 158*  --  136*   BUN 85*   < > 50* 41*  --  56*   CREATSERUM 1.73*   < > 1.32* 1.50*  --  1.77*   CA 8.9   < > 8.4* 8.4*  --  8.5*   ALB 3.5  --  3.5 3.5  --  3.5   *   < > 130* 126*  --  127*   K 6.1*   < > 5.2* 5.5* 5.4* 5.4*   CL 98   < > 99 98  --  94*   CO2 19.0*   < > 25.0 19.0*  --  24.0   ALKPHO 323*  --  195*  --   --   --    *  --  36*  --   --   --    ALT 53*  --  18  --   --   --    BILT 1.5*  --  1.2*  --   --   --    TP 5.8  --  5.9  --   --   --     < > = values in this interval not displayed.     No results found for: \"PTT\", \"INR\"  No results for input(s): \"BNP\" in the last 168 hours.  Recent Labs   Lab 05/07/24  0117 05/09/24  0606 05/12/24  1606 05/13/24  0607   MG 2.8* 2.9*  --   --    PHOS  --   --  4.7 5.1     Lab Results   Component Value Date    COLORUR Yellow 05/13/2024    CLARITY Turbid 05/13/2024    SPECGRAVITY 1.022 05/13/2024    GLUUR Normal 05/13/2024    BILUR Negative 05/13/2024    KETUR Negative 05/13/2024    BLOODURINE Negative 05/13/2024    PHURINE 5.0 05/13/2024    PROUR 30 05/13/2024    UROBILINOGEN 2 05/13/2024    NITRITE Negative 05/13/2024    LEUUR Negative 05/13/2024      Recent Labs   Lab 05/11/24  1836 05/12/24  1606 05/13/24  0607   PHOS  --  4.7 5.1   ALB 3.5 3.5 3.5       XR CHEST AP PORTABLE  (CPT=71045)    Result Date: 5/13/2024  CONCLUSION:  1. Probable mild congestive failure with small bilateral pleural effusions.  Approximate 4.3 cm ovoid focus of nodularity at the periphery of the left lung base is unchanged since chest radiograph from 2 days prior and could relate to loculation of pleural fluid, infection/pneumonia, or even a pleural based mass.  Suggest either radiographic follow-up to resolution or chest CT correlation for complete evaluation. 2. Hyperinflation.  Stable elevation of the left hemidiaphragm.  Stable probable bibasilar atelectasis/scarring.    elm-remote  Dictated by (CST): Rolando Spangler MD on 5/13/2024 at 9:02 AM     Finalized by (CST): Rolando Spangler MD on 5/13/2024 at 9:07 AM          XR CHEST AP PORTABLE  (CPT=71045)    Result Date: 5/11/2024  CONCLUSION:  1. CHF/fluid overload superimposed on emphysema.  2. Slight loculation of small bibasilar pleural effusions versus small pleural effusions with pleural scarring.  No significant change.    Dictated by (CST): Hayes Bauer MD on 5/11/2024 at 7:06 PM     Finalized by (CST): Hayes Bauer MD on 5/11/2024 at 7:09 PM         EKG 12 Lead    Result Date: 5/12/2024  Sinus rhythm with Premature atrial complexes Minimal voltage criteria for LVH, may be normal variant ( Bipin product ) Borderline ECG When compared with ECG of 08-MAY-2024 14:26, Fewer PACs Confirmed by DO Remy Asif ([id number]) on 5/12/2024 1:38:14 PM     Assessment and Plan:      89 y/o F with h/o COPD on home o2 2L , pulm HTN, NH resident 3 rd admission in last mth presented to ER with chest tightness and sob. IN ER cr 1.3 mg/dl    Patient was hospitalized 5/1-5/4/24 for faLL / UTI/ klebsiella , QUITA   Readmitted 5/6- 5/9 again with Quita / hyperkalemia/ chf exacerbation discharged without diuretics    QUITA : likely cardiorenal.  UA  noticed for hyaline cast.  Not suggestive of infection.  Urine sodium less than 10 suggestive of low renal perfusion.  Diuretics on hold.  Cardiology input noted  Hyperkalemia: Potassium 6.1 on admit.  Repeat 5.4.  Low potassium diet.  Hyperkalemia possibly secondary to poor kaliuresis from reduced renal perfusion.  Will IV albumin  Hyponatremia: Low urine sodium suggestive of low renal perfusion.  Urine osmolality in normal range.  Will give IV albumin and recheck in a.m. serum sodium on last discharge 130-126 on admit  Hypoxic resp failure. > fluid + possible copd exacerbation/pna. Gentle diuresis   Hypotension, not on any antihypertensives currently . Will use midodrine to help with bp   Copd . Inhalers/ steroids      Palliative care input noted.  Patient DNAR/DNI.  [facility] hosp[facility] to provide informational session tomorrow.  Plan to avoid rehospitalization    Discussed with nursing       Cruzito Santo MD  5/13/2024

## 2024-05-14 PROBLEM — N17.9 ACUTE KIDNEY INJURY (HCC): Status: ACTIVE | Noted: 2024-01-01

## 2024-05-14 NOTE — DIETARY NOTE
BRIEF DIETITIAN NOTE     Pt re-screened for MST as of 5/13. Deferred assessment until [facility] meeting today. Per CM AMBER, pt family has signed consents for hospice. Residential hospice planning in place. Pt with variable intakes.Please consult RD if earlier nutrition intervention is needed.     Percent Meals Eaten (last 6 days)       Date/Time Percent Meals Eaten (%)    05/12/24 1300 100 %    05/12/24 1700 70 %    05/13/24 0900 0 %    05/13/24 1741 40 %             Wt Readings from Last 6 Encounters:   05/14/24 56.5 kg (124 lb 8 oz)   05/09/24 51.3 kg (113 lb 3.2 oz)   05/04/24 51.5 kg (113 lb 8 oz)   06/28/23 47.6 kg (105 lb)   05/11/23 52.6 kg (116 lb)   04/05/23 56.5 kg (124 lb 9.6 oz)        F/u per protocol or as appropriate.       Reena Solis RD, LDN  Clinical Dietitian  Office: [phone]

## 2024-05-14 NOTE — PROGRESS NOTES
[facility]  part of [facility]    Nephrology Progress Note    Chief Complaint   Patient presents with    Chest Pain Angina       Subjective:   89 year old female, following for QUITA and hyponatremia.     Pt drowsy, arousable. Not talking much.     Review of Systems:   Review of Systems   Unable to perform ROS: Acuity of condition       Objective:   Temp:  [97.3 °F (36.3 °C)-97.4 °F (36.3 °C)] 97.4 °F (36.3 °C)  Pulse:  [84-96] 87  Resp:  [18-22] 18  BP: ()/(54-77) 90/66  SpO2:  [92 %-97 %] 94 %  SpO2: 94 %     Intake/Output Summary (Last 24 hours) at 5/14/2024 1124  Last data filed at 5/14/2024 0559  Gross per 24 hour   Intake 480 ml   Output 800 ml   Net -320 ml     Wt Readings from Last 3 Encounters:   05/14/24 124 lb 8 oz (56.5 kg)   05/09/24 113 lb 3.2 oz (51.3 kg)   05/04/24 113 lb 8 oz (51.5 kg)     Physical Exam  Constitutional:       Appearance: She is ill-appearing.   Cardiovascular:      Rate and Rhythm: Normal rate and regular rhythm.      Heart sounds: Normal heart sounds.   Pulmonary:      Effort: Pulmonary effort is normal.      Breath sounds: Normal breath sounds.   Musculoskeletal:         General: Normal range of motion.   Skin:     General: Skin is warm and dry.   Neurological:      General: No focal deficit present.      Mental Status: She is alert.   Psychiatric:         Mood and Affect: Mood normal.         Behavior: Behavior normal.         Medications:  Current Facility-Administered Medications   Medication Dose Route Frequency    midodrine (ProAmatine) tab 5 mg  5 mg Oral TID    atorvastatin (Lipitor) tab 10 mg  10 mg Oral Nightly    docusate sodium (Colace) cap 100 mg  100 mg Oral BID    fluticasone furoate-vilanterol (Breo Ellipta) 100-25 MCG/ACT inhaler 1 puff  1 puff Inhalation QAM    ipratropium-albuterol (Duoneb) 0.5-2.5 (3) MG/3ML inhalation solution 3 mL  3 mL Nebulization Q6H PRN    metoprolol tartrate (Lopressor) partial tab 12.5 mg  12.5 mg Oral 2x  Daily(Beta Blocker)    polyethylene glycol (PEG 3350) (Miralax) 17 g oral packet 17 g  17 g Oral Daily PRN    heparin (Porcine) 5000 UNIT/ML injection 5,000 Units  5,000 Units Subcutaneous Q8H formerly [facility]    acetaminophen (Tylenol Extra Strength) tab 500 mg  500 mg Oral Q4H PRN    melatonin tab 3 mg  3 mg Oral Nightly PRN    albuterol (Ventolin) (2.5 MG/3ML) 0.083% nebulizer solution 2.5 mg  2.5 mg Nebulization Q4H PRN    predniSONE (Deltasone) tab 40 mg  40 mg Oral Daily    azithromycin (Zithromax) tab 250 mg  250 mg Oral Nightly    guaiFENesin (Robitussin) 100 MG/5 ML oral liquid 200 mg  200 mg Oral Q4H PRN              Results:     Recent Labs   Lab 05/11/24 1836 05/13/24  0607 05/14/24  0536   RBC 4.45 4.28 4.41   HGB 14.3 14.4 14.5   HCT 43.7 42.5 43.0   MCV 98.2 99.3 97.5   NEPRELIM 10.35* 7.10 8.82*   WBC 13.2* 8.7 10.8   .0 188.0 217.0     Recent Labs   Lab 05/11/24  1836 05/12/24  1606 05/12/24 2226 05/13/24  0607 05/14/24  0536   * 158*  --  136* 111*   BUN 50* 41*  --  56* 63*   CREATSERUM 1.32* 1.50*  --  1.77* 2.15*   CA 8.4* 8.4*  --  8.5* 8.4*   ALB 3.5 3.5  --  3.5  --    * 126*  --  127* 125*   K 5.2* 5.5* 5.4* 5.4* 4.5   CL 99 98  --  94* 92*   CO2 25.0 19.0*  --  24.0 23.0   ALKPHO 195*  --   --   --   --    AST 36*  --   --   --   --    ALT 18  --   --   --   --    BILT 1.2*  --   --   --   --    TP 5.9  --   --   --   --      No results found for: \"PTT\", \"INR\"  No results for input(s): \"BNP\" in the last 168 hours.  Recent Labs   Lab 05/09/24  0606 05/12/24  1606 05/13/24  0607   MG 2.9*  --   --    PHOS  --  4.7 5.1     Lab Results   Component Value Date    COLORUR Yellow 05/13/2024    CLARITY Turbid 05/13/2024    SPECGRAVITY 1.022 05/13/2024    GLUUR Normal 05/13/2024    BILUR Negative 05/13/2024    KETUR Negative 05/13/2024    BLOODURINE Negative 05/13/2024    PHURINE 5.0 05/13/2024    PROUR 30 05/13/2024    UROBILINOGEN 2 05/13/2024    NITRITE Negative 05/13/2024    LEUUR  Negative 05/13/2024     Recent Labs   Lab 05/11/24  1836 05/12/24  1606 05/13/24  0607   PHOS  --  4.7 5.1   ALB 3.5 3.5 3.5       XR CHEST AP PORTABLE  (CPT=71045)    Result Date: 5/13/2024  CONCLUSION:  1. Probable mild congestive failure with small bilateral pleural effusions.  Approximate 4.3 cm ovoid focus of nodularity at the periphery of the left lung base is unchanged since chest radiograph from 2 days prior and could relate to loculation of pleural fluid, infection/pneumonia, or even a pleural based mass.  Suggest either radiographic follow-up to resolution or chest CT correlation for complete evaluation. 2. Hyperinflation.  Stable elevation of the left hemidiaphragm.  Stable probable bibasilar atelectasis/scarring.    elm-remote  Dictated by (CST): Rolando Spangler MD on 5/13/2024 at 9:02 AM     Finalized by (CST): Rolando Spangler MD on 5/13/2024 at 9:07 AM               Assessment and Plan:     89 year old female with past medical history of COPD on home oxygen 2L, pulm HTN, NH resident 3rd admission in last mth presented with chest tightness and shortness of breath.      QUITA:   Likely cardiorenal. Cr increased to 2.15 today  Diuretics on hold.      Hyponatremia:   Sodium 125 today.     Patient had Palliative/hospice eval today and plan to dc on hospice tomorrow.    Will sign off.      Mariama Quarles MD  5/14/2024

## 2024-05-14 NOTE — PALLIATIVE CARE NOTE
[facility]  part of [facility]  Palliative Care Progress Note    Lina Dudley Patient Status:  Observation    1926 MRN [id number]   Location [facility] 3W/SW Attending Mayi Gaxiola MD   Hosp Day # 0 PCP ROBYN KULKARNI MD     The  Cures Act makes medical notes like these available to patients in the interest of transparency. Please be advised this is a medical document. Medical documents are intended to carry relevant information, facts as evident, and the clinical opinion of the practitioner. The medical note is intended as peer to peer communication and may appear blunt or direct. It is written in medical language and may contain abbreviations or verbiage that are unfamiliar.       Subjective     Reviewed symptom medications past 24 hrs: Tylenol 500 mg po X3    Patient was seen and examined in bed with no family at the bedside. Pt is A&OX2 and appears very weak. +fatigue. He tells me she doesn't feel well today and is reporting dyspnea symptoms at rest, but no signs of distress. Checked RA sats 88%, placed on nc2L sats up to 94%. She is reporting generalized pain. Appetite is poor and she is not hungry for breakfast, denies abdominal pain, n/v and moved her bowels yesterday.    See summary of discussion below.     Review of Systems:  Pertinent items are noted in subjective.    Allergies:  Allergies   Allergen Reactions    Bacitracin UNKNOWN    Neosporin Original [Neomycin-Bacitracin-Polymyxin] ITCHING    Polymyxin B UNKNOWN       Medications:     Current Facility-Administered Medications:     midodrine (ProAmatine) tab 5 mg, 5 mg, Oral, TID    atorvastatin (Lipitor) tab 10 mg, 10 mg, Oral, Nightly    docusate sodium (Colace) cap 100 mg, 100 mg, Oral, BID    fluticasone furoate-vilanterol (Breo Ellipta) 100-25 MCG/ACT inhaler 1 puff, 1 puff, Inhalation, QAM    ipratropium-albuterol (Duoneb) 0.5-2.5 (3) MG/3ML inhalation solution 3 mL, 3 mL, Nebulization,  Q6H PRN    metoprolol tartrate (Lopressor) partial tab 12.5 mg, 12.5 mg, Oral, 2x Daily(Beta Blocker)    polyethylene glycol (PEG 3350) (Miralax) 17 g oral packet 17 g, 17 g, Oral, Daily PRN    heparin (Porcine) 5000 UNIT/ML injection 5,000 Units, 5,000 Units, Subcutaneous, Q8H GIANCARLO    acetaminophen (Tylenol Extra Strength) tab 500 mg, 500 mg, Oral, Q4H PRN    melatonin tab 3 mg, 3 mg, Oral, Nightly PRN    albuterol (Ventolin) (2.5 MG/3ML) 0.083% nebulizer solution 2.5 mg, 2.5 mg, Nebulization, Q4H PRN    predniSONE (Deltasone) tab 40 mg, 40 mg, Oral, Daily    [COMPLETED] azithromycin (Zithromax) tab 500 mg, 500 mg, Oral, Once **FOLLOWED BY** azithromycin (Zithromax) tab 250 mg, 250 mg, Oral, Nightly    guaiFENesin (Robitussin) 100 MG/5 ML oral liquid 200 mg, 200 mg, Oral, Q4H PRN    Objective     Vital Signs:  Blood pressure 90/66, pulse 87, temperature 97.4 °F (36.3 °C), temperature source Oral, resp. rate 18, height 5' 7\" (1.702 m), weight 124 lb 8 oz (56.5 kg), SpO2 94%, not currently breastfeeding.  Body mass index is 19.5 kg/m².  Present Level of pain: reporting mild generalized pain  Non-verbal signs of pain present: No    Physical Exam:  General: Alert and in no apparent distress. Weak, frail/thin appearing  HEENT: No focal deficits.  Cardiac: Regular rate and rhythm, S1, S2 normal, no murmur, rub or gallop.  Lungs: Diminished breath sounds bilaterally.  Normal excursions and effort.  Abdomen: Soft, non-tender, normal bowel sounds X 4 quadrants, no rebound or guarding  Extremities: Without clubbing, cyanosis. Peripheral pulses are 2+. BLE Edema not present  Neurologic: Alert and oriented X2, follows commands  Skin: Warm and dry.       Prior to admission Palliative performance scale PPSv2 (%): 40    Current PPS 30%    Hematology:  Lab Results   Component Value Date    WBC 10.8 05/14/2024    HGB 14.5 05/14/2024    HCT 43.0 05/14/2024    .0 05/14/2024       Coags:No results found for: \"PT\", \"INR\",  \"PTT\"    Chemistry:  Lab Results   Component Value Date    CREATSERUM 2.15 (H) 05/14/2024    BUN 63 (H) 05/14/2024     (L) 05/14/2024    K 4.5 05/14/2024    CL 92 (L) 05/14/2024    CO2 23.0 05/14/2024     (H) 05/14/2024    CA 8.4 (L) 05/14/2024    ALB 3.5 05/13/2024    ALKPHO 195 (H) 05/11/2024    BILT 1.2 (H) 05/11/2024    TP 5.9 05/11/2024    AST 36 (H) 05/11/2024    ALT 18 05/11/2024    MG 2.9 (H) 05/09/2024    PHOS 5.1 05/13/2024       Imaging:  XR CHEST AP PORTABLE  (CPT=71045)    Result Date: 5/13/2024  CONCLUSION:  1. Probable mild congestive failure with small bilateral pleural effusions.  Approximate 4.3 cm ovoid focus of nodularity at the periphery of the left lung base is unchanged since chest radiograph from 2 days prior and could relate to loculation of pleural fluid, infection/pneumonia, or even a pleural based mass.  Suggest either radiographic follow-up to resolution or chest CT correlation for complete evaluation. 2. Hyperinflation.  Stable elevation of the left hemidiaphragm.  Stable probable bibasilar atelectasis/scarring.    elm-remote  Dictated by (CST): Rolando Spangler MD on 5/13/2024 at 9:02 AM     Finalized by (CST): Rolando Spangler MD on 5/13/2024 at 9:07 AM          XR CHEST AP PORTABLE  (CPT=71045)    Result Date: 5/11/2024  CONCLUSION:  1. CHF/fluid overload superimposed on emphysema.  2. Slight loculation of small bibasilar pleural effusions versus small pleural effusions with pleural scarring.  No significant change.    Dictated by (CST): Hayes Bauer MD on 5/11/2024 at 7:06 PM     Finalized by (CST): Hayes Bauer MD on 5/11/2024 at 7:09 PM           Follow up GOC Discussion      5/14/24-Awaiting hospice meeting outcome. I talked with Jayesh CLARK from [facility] and meeting planned today at 11a with tona Christina.      Discussed with Patient and Family: yes  Patient's preference about sharing medical information: speak to pt and antoniaece Christina  Patient's decision making  preferences: speak to pt and niece Christina  Code status: DNAR/DNI-selective  Have advanced directives been discussed with patient or healthcare power of : Yes        Healthcare Agent Appointed: Yes  Healthcare Agent's Name: Christina Pike  Healthcare Agent's Phone Number: [phone]          Spiritual needs addressed: Patient/family declined Spiritual Care    Palliative disposition: Ongoing discussions    Procedures:  No intubation    Palliative Care Assessment and Recommendations     Problem List:     CHF exacerbation  COPD exacerbation  Possible pneumonia  Chronic respiratory failure  Pulmonary HTN/severe TR  QUITA  Hyponatremia  Hyperkalemia  Afib  HLD  HTN  Recent fall  Weakness     Goals of care counseling  -see above for details  -Confirmed wishes for DNAR/DNI and continue conservative medical management while hospitalized. Pt prefers to avoid future re-hospitalizations.  -[facility] meeting planned for today at 11a-await outcome.  -Ongoing GOC discussions may be needed.  -Agreeable to palliative care following  -Dispo:  Prefers to return home with CG support and  possible hospice care pending meeting outcome. AMBER to help with dc planning.   -Provided emotional support to pt/family who are coping adequately.     Advance care planning  -Pt's niece Christina Pike is HCPOA #[phone]  -Previously completed POLST form on file in [employer].      Palliative Performance Scale 30%     Emotion support provided to patient/family today: Yes    A total of 25 mins were spent on this consult, which included all of the following: direct face to face contact, history taking, physical examination, and >50% was spent counseling and coordinating care.    Discussed today's visit with [facility]esh [facility] and Jes HENSLEY.    I am out of the office tomorrow, and will follow back on Thursday. Please contact my partners for any palliative care needs in my absence.     Rand Owen, ANP-BC, [facility] [id number]  5/14/2024  10:35  AM  Palliative Care Services

## 2024-05-14 NOTE — PLAN OF CARE
Plan for DC home tomorrow with hospice  Problem: Patient Centered Care  Goal: Patient preferences are identified and integrated in the patient's plan of care  Description: Interventions:  - What would you like us to know as we care for you? I came from [facility] [place]  - Provide timely, complete, and accurate information to patient/family  - Incorporate patient and family knowledge, values, beliefs, and cultural backgrounds into the planning and delivery of care  - Encourage patient/family to participate in care and decision-making at the level they choose  - Honor patient and family perspectives and choices  Outcome: Progressing     Problem: Patient/Family Goals  Goal: Patient/Family Long Term Goal  Description: Patient's Long Term Goal: to stop being short of breath    Interventions:  - PRN oxygen  - cough deep breathing  -sit high fowlers  - See additional Care Plan goals for specific interventions  Outcome: Progressing  Goal: Patient/Family Short Term Goal  Description: Patient's Short Term Goal: to stop having pain in my legs    Interventions:   - monitor vitals  - Q 2 turn  -PRN tylenol  - See additional Care Plan goals for specific interventions  Outcome: Progressing     Problem: CARDIOVASCULAR - ADULT  Goal: Maintains optimal cardiac output and hemodynamic stability  Description: INTERVENTIONS:  - Monitor vital signs, rhythm, and trends  - Monitor for bleeding, hypotension and signs of decreased cardiac output  - Evaluate effectiveness of vasoactive medications to optimize hemodynamic stability  - Monitor arterial and/or venous puncture sites for bleeding and/or hematoma  - Assess quality of pulses, skin color and temperature  - Assess for signs of decreased coronary artery perfusion - ex. Angina  - Evaluate fluid balance, assess for edema, trend weights  Outcome: Progressing  Goal: Absence of cardiac arrhythmias or at baseline  Description: INTERVENTIONS:  - Continuous cardiac monitoring, monitor vital signs,  obtain 12 lead EKG if indicated  - Evaluate effectiveness of antiarrhythmic and heart rate control medications as ordered  - Initiate emergency measures for life threatening arrhythmias  - Monitor electrolytes and administer replacement therapy as ordered  Outcome: Progressing     Problem: RESPIRATORY - ADULT  Goal: Achieves optimal ventilation and oxygenation  Description: INTERVENTIONS:  - Assess for changes in respiratory status  - Assess for changes in mentation and behavior  - Position to facilitate oxygenation and minimize respiratory effort  - Oxygen supplementation based on oxygen saturation or ABGs  - Provide Smoking Cessation handout, if applicable  - Encourage broncho-pulmonary hygiene including cough, deep breathe, Incentive Spirometry  - Assess the need for suctioning and perform as needed  - Assess and instruct to report SOB or any respiratory difficulty  - Respiratory Therapy support as indicated  - Manage/alleviate anxiety  - Monitor for signs/symptoms of CO2 retention  Outcome: Progressing     Problem: GENITOURINARY - ADULT  Goal: Absence of urinary retention  Description: INTERVENTIONS:  - Assess patient’s ability to void and empty bladder  - Monitor intake/output and perform bladder scan as needed  - Follow urinary retention protocol/standard of care  - Consider collaborating with pharmacy to review patient's medication profile  - Implement strategies to promote bladder emptying  Outcome: Progressing     Problem: METABOLIC/FLUID AND ELECTROLYTES - ADULT  Goal: Glucose maintained within prescribed range  Description: INTERVENTIONS:  - Monitor Blood Glucose as ordered  - Assess for signs and symptoms of hyperglycemia and hypoglycemia  - Administer ordered medications to maintain glucose within target range  - Assess barriers to adequate nutritional intake and initiate nutrition consult as needed  - Instruct patient on self management of diabetes  Outcome: Progressing  Goal: Electrolytes maintained  within normal limits  Description: INTERVENTIONS:  - Monitor labs and rhythm and assess patient for signs and symptoms of electrolyte imbalances  - Administer electrolyte replacement as ordered  - Monitor response to electrolyte replacements, including rhythm and repeat lab results as appropriate  - Fluid restriction as ordered  - Instruct patient on fluid and nutrition restrictions as appropriate  Outcome: Progressing     Problem: SKIN/TISSUE INTEGRITY - ADULT  Goal: Skin integrity remains intact  Description: INTERVENTIONS  - Assess and document risk factors for pressure ulcer development  - Assess and document skin integrity  - Monitor for areas of redness and/or skin breakdown  - Initiate interventions, skin care algorithm/standards of care as needed  Outcome: Not Progressing     Problem: HEMATOLOGIC - ADULT  Goal: Maintains hematologic stability  Description: INTERVENTIONS  - Assess for signs and symptoms of bleeding or hemorrhage  - Monitor labs and vital signs for trends  - Administer supportive blood products/factors, fluids and medications as ordered and appropriate  - Administer supportive blood products/factors as ordered and appropriate  Outcome: Progressing     Problem: MUSCULOSKELETAL - ADULT  Goal: Return mobility to safest level of function  Description: INTERVENTIONS:  - Assess patient stability and activity tolerance for standing, transferring and ambulating w/ or w/o assistive devices  - Assist with transfers and ambulation using safe patient handling equipment as needed  - Ensure adequate protection for wounds/incisions during mobilization  - Obtain PT/OT consults as needed  - Advance activity as appropriate  - Communicate ordered activity level and limitations with patient/family  Outcome: Not Progressing

## 2024-05-14 NOTE — CM/SW NOTE
[facility] LSW met with pt and niece at bedside. Pt was awake, but appeared confused and disoriented. Pt kept pulling on wires and tubing and asking what they were for. LSW explained hospice philosophy, levels of care and Medicare benefit. LSW went over DNR comfort. Niece chose to leave D[facility] at this time. Pt's niece signed consents. Pt lives alone, so niece needs to get a caregiver in place for a safe discharge. LSW gave niece a list of caregiving agencies. Potential discharge tomorrow late afternoon/early evening. LSW will follow up with niece later this afternoon to identify caregiver and discharge plan. LSW communicated with MD Khalida Rocha and GARRETT Gonzales regarding potential discharge plan.       1600: LSW follow up with pt's niece via phone call. Niece and LSW still working on a safe discharge plan for pt. Discharge most likelyThursday (5-16-24) morning/afternoon. LSW informed MD Khalida Rocha and GARRETT Singletary of new discharge date.       Per TIARRA Ambrocio  [facility]  [id number]

## 2024-05-14 NOTE — CM/SW NOTE
Social work received an update from the [facility] team.    The patient's niece has signed hospice consents but will need a caregiver for the patient before discharge.    The [facility] team gave the patient's niece a list of care giving agencies.     Social work will follow up with [facility] on progress with the caregiver.    SW/CM to remain available for support and/or discharge planning.     Gemini Stephen MSW, LSW  Discharge Planner [id number]

## 2024-05-14 NOTE — PROGRESS NOTES
Cardiology Progress Note    Lina Dudley Patient Status:  Observation    1926 MRN [id number]   Location [facility] 3W/SW Attending Mayi Gaxiola MD   Hosp Day # 0 PCP ROBYN KULKARNI MD     Interval Note:  Patient resting in bed  No shortness of breath  Looking comfortable      --------------------------------------------------------------------------------------------------------------------------------  ROS 12 systems reviewed, pertinent findings above.  ROS    History:  Past Medical History:    Appendicitis    APPENDECTOMY    Arthritis    Basal cell carcinoma    Of Face     Chicken pox    COPD (chronic obstructive pulmonary disease) (HCC)    Left arm numbness    Measles    Other and unspecified hyperlipidemia    Pneumonia    Tonsillitis    Unspecified essential hypertension     Past Surgical History:   Procedure Laterality Date    Appendectomy      Cataract extraction Bilateral     Dr. Tirado's office    Glaucoma surgery      Hip replacement surgery      Skin surgery  Excision of Basal cell carcinoma of face     Tonsillectomy      T&A     Family History   Problem Relation Age of Onset    Cancer Father     Hypertension Father     Heart Disease Father     Heart Disease Mother     Gastro-Intestinal Disorder Maternal Grandmother         TUBERCULOSIS    Breast Cancer Maternal Aunt         80s    Neurological Disorder Other         GREAT AUNT, EPILEPSY    Lipids Other         HYPERLIPIDEMIA    Glaucoma Neg     Diabetes Neg       reports that she quit smoking about 34 years ago. Her smoking use included cigarettes. She started smoking about 64 years ago. She has a 30 pack-year smoking history. She has quit using smokeless tobacco. She reports that she does not drink alcohol and does not use drugs.    Objective:   Temp: 97.3 °F (36.3 °C)  Pulse: 79  Resp: 20  BP: 104/75    Intake/Output:     Intake/Output Summary (Last 24 hours) at 2024 1604  Last data filed at 2024 2882  Gross per  24 hour   Intake 480 ml   Output 800 ml   Net -320 ml       Physical Exam:    General: Awake in no distress  HEENT: Normocephalic, anicteric sclera, neck supple.  Neck: No JVD, carotids 2+, no bruits.  Cardiac: Regular rate and rhythm. S1, S2 normal. No murmur, pericardial rub, S3.  Lungs: Clear without wheezes, rales, rhonchi or dullness.  Normal excursions and effort.  Abdomen: Soft, non-tender. BS-present.  Extremities: Without clubbing, cyanosis or edema.  Peripheral pulses are 2+.  Neurologic: Non-focal  Skin: Warm and dry.       Assessment and Plan  Chest tenderness, shortness of breath  Possible pneumonia  Possible heart failure exacerbation  History of pulm hypertension  COPD  Hyponatremia  Hyperkalemia-resolved  Plan:  Continue metoprolol 12.5 mg twice a day  Midodrine 5 mg p.o. 3 times a day with holding parameters  Atorvastatin 10 mg p.o. daily  Antibiotics per PCP's  Patient had hospice eval today.  Patient to have home hospice.  Patient lives alone and needs a caregiver in place for safe discharge.  Will sign off      Level of care: L3    Van Nix MD  [facility]      5/14/2024  4:06 PM

## 2024-05-14 NOTE — PLAN OF CARE
Pt AOX1-2, [place], weaned from 1 1/2L NC to RA - saturating @97%. PRN tylenol given for pain-pt turned Q2H, foam dressing on coccyx, and bilateral heel boots applied. Pt w/L lower leg wound dressed in gauze and kerlix-no drainage noted and dressing clean and intact. Bed alarm on and call light within reach. Will endorse care to oncoming RN.     Problem: CARDIOVASCULAR - ADULT  Goal: Maintains optimal cardiac output and hemodynamic stability  Description: INTERVENTIONS:  - Monitor vital signs, rhythm, and trends  - Monitor for bleeding, hypotension and signs of decreased cardiac output  - Evaluate effectiveness of vasoactive medications to optimize hemodynamic stability  - Monitor arterial and/or venous puncture sites for bleeding and/or hematoma  - Assess quality of pulses, skin color and temperature  - Assess for signs of decreased coronary artery perfusion - ex. Angina  - Evaluate fluid balance, assess for edema, trend weights  Outcome: Progressing  Goal: Absence of cardiac arrhythmias or at baseline  Description: INTERVENTIONS:  - Continuous cardiac monitoring, monitor vital signs, obtain 12 lead EKG if indicated  - Evaluate effectiveness of antiarrhythmic and heart rate control medications as ordered  - Initiate emergency measures for life threatening arrhythmias  - Monitor electrolytes and administer replacement therapy as ordered  Outcome: Progressing     Problem: RESPIRATORY - ADULT  Goal: Achieves optimal ventilation and oxygenation  Description: INTERVENTIONS:  - Assess for changes in respiratory status  - Assess for changes in mentation and behavior  - Position to facilitate oxygenation and minimize respiratory effort  - Oxygen supplementation based on oxygen saturation or ABGs  - Provide Smoking Cessation handout, if applicable  - Encourage broncho-pulmonary hygiene including cough, deep breathe, Incentive Spirometry  - Assess the need for suctioning and perform as needed  - Assess and instruct to report SOB  or any respiratory difficulty  - Respiratory Therapy support as indicated  - Manage/alleviate anxiety  - Monitor for signs/symptoms of CO2 retention  Outcome: Progressing     Problem: SKIN/TISSUE INTEGRITY - ADULT  Goal: Skin integrity remains intact  Description: INTERVENTIONS  - Assess and document risk factors for pressure ulcer development  - Assess and document skin integrity  - Monitor for areas of redness and/or skin breakdown  - Initiate interventions, skin care algorithm/standards of care as needed  Outcome: Progressing

## 2024-05-15 NOTE — PLAN OF CARE
Pt alert and oriented to self, on 1L NC saturating well with no SOB noted. VSS, NSR on tele and no c/o pain. Pt with gen bruising, skin tears, and Left LE blister with weeping noted. Foam dressing on sacrum, LE dressed with gauze, abd pad and kerlix. Pt turned Q2H, bed alarm on, and call light within reach. POC to transfer to 4th floor when bed available. Will endorse care to oncoming RN.       Problem: CARDIOVASCULAR - ADULT  Goal: Maintains optimal cardiac output and hemodynamic stability  Description: INTERVENTIONS:  - Monitor vital signs, rhythm, and trends  - Monitor for bleeding, hypotension and signs of decreased cardiac output  - Evaluate effectiveness of vasoactive medications to optimize hemodynamic stability  - Monitor arterial and/or venous puncture sites for bleeding and/or hematoma  - Assess quality of pulses, skin color and temperature  - Assess for signs of decreased coronary artery perfusion - ex. Angina  - Evaluate fluid balance, assess for edema, trend weights  Outcome: Progressing  Goal: Absence of cardiac arrhythmias or at baseline  Description: INTERVENTIONS:  - Continuous cardiac monitoring, monitor vital signs, obtain 12 lead EKG if indicated  - Evaluate effectiveness of antiarrhythmic and heart rate control medications as ordered  - Initiate emergency measures for life threatening arrhythmias  - Monitor electrolytes and administer replacement therapy as ordered  Outcome: Progressing     Problem: RESPIRATORY - ADULT  Goal: Achieves optimal ventilation and oxygenation  Description: INTERVENTIONS:  - Assess for changes in respiratory status  - Assess for changes in mentation and behavior  - Position to facilitate oxygenation and minimize respiratory effort  - Oxygen supplementation based on oxygen saturation or ABGs  - Provide Smoking Cessation handout, if applicable  - Encourage broncho-pulmonary hygiene including cough, deep breathe, Incentive Spirometry  - Assess the need for suctioning and  perform as needed  - Assess and instruct to report SOB or any respiratory difficulty  - Respiratory Therapy support as indicated  - Manage/alleviate anxiety  - Monitor for signs/symptoms of CO2 retention  Outcome: Progressing     Problem: SKIN/TISSUE INTEGRITY - ADULT  Goal: Skin integrity remains intact  Description: INTERVENTIONS  - Assess and document risk factors for pressure ulcer development  - Assess and document skin integrity  - Monitor for areas of redness and/or skin breakdown  - Initiate interventions, skin care algorithm/standards of care as needed  Outcome: Progressing

## 2024-05-15 NOTE — PROGRESS NOTES
[facility]  part of [facility]    Progress Note    Lina Dudley Patient Status:  Observation    1926 MRN [id number]   Location [facility] 3W/SW Attending Mayi Gaxiola MD   Hosp Day # 0 PCP ROBYN KULKARNI MD     Chief complaint: sob  Subjective:     Complains of pain \"all over\"  Poor appetite  Very hard of hearing  BP low nl  Urine output low    Objective:   Blood pressure 92/72, pulse 90, temperature 97.9 °F (36.6 °C), temperature source Axillary, resp. rate 16, height 5' 7\" (1.702 m), weight 121 lb 6.4 oz (55.1 kg), SpO2 95%, not currently breastfeeding.    GEN: Looks ill, frail, dyspneic  HEENT: conjunctivae/corneas clear. PERRL, EOM's intact.  Neck: no adenopathy, no carotid bruit, supple  Pulmonary: Diminished, coarse to auscultation bilaterally  Cardiovascular: Irregular  Abdominal: soft, non-tender; bowel sounds normal;   Extremities: no cyanosis, + mild pitting edema  Pulses: palpable and symmetric  Skin: Warm and dry, skin thin, healing bruises  Neurologic: Alert and oriented X 3, hard of hearing, generalized weakness, moves all extremities  Psychiatric: calm, cooperative    Assessment and Plan:   Shortness of breath, severe, multifactorial, out of proportion to level of hypoxemia, on 2 L  CHF exacerbation  COPD exacerbation  Pulmonary hypertension  Possible pneumonia with leukocytosis, slightly elevated procal ~0.18   Chronic respiratory failure, currently at baseline oxygen 2 L  Overall deconditioning, frailty likely contributing to severe dyspnea   -tele  -Gentle diuresis as blood pressure tolerates  -BNP improved when compared to prior, although high  -Telemetry  -Cardiology has been consulted, follow recommendations  -Steroids, nebs, antibiotics/azithromycin  -Diuresis limited by low blood pressure and elevated creatinine  -Nephrology consulted to help with volume status, d/w Dr. Gomez  -started on midodrine  -recheck CXR  reviewed-->  1. Probable  mild congestive failure with small bilateral pleural effusions.  Approximate 4.3 cm ovoid focus of nodularity at the periphery of the left lung base is unchanged since chest radiograph from 2 days prior and could relate to loculation of   pleural fluid, infection/pneumonia, or even a pleural based mass.  Suggest either radiographic follow-up to resolution or chest CT correlation for complete evaluation.   2. Hyperinflation.  Stable elevation of the left hemidiaphragm.  Stable probable bibasilar atelectasis/scarring.   -add acapella, if able to participate  -scheduled nebs  -overall prognosis poor     Atrial fibrillation, noted last admission  -Sinus rhythm currently  -Telemetry  -Not candidate for anticoagulation given recurrent falls  -metoprolol held     Renal insufficiency/QUITA  Hyponatremia  Hyperkalemia  -Creatinine about the same initially compared to recent admission  -now trending up  -Monitor on diuretics  -nephrology consulted  -check UA, lytes, osmolality  -check renal US-->  1. Kidneys are mildly atrophic bilaterally but normal in echotexture without hydronephrosis or perinephric fluid.   2. Small simple parapelvic renal cysts bilaterally.   3. Unremarkable appearance of the bladder.      Leukocytosis  -Procalcitonin minimally elevated  -No obvious sign/symptom of infection except possibly respiratory     Mild hyperkalemia  -Received Lasix in the ED  -s/p lokelma on 5/12  -Monitor     Hyperlipidemia  HTN  -Resume home medications      DVT prophylaxis: heparin subcutaneous    Social: from rehab    DN[facility], pt and her niece Christina met with [facility]/palliative care/ during the last admission 5/3, consulted  Pt and family also met with hospice 5/14, plans for home hospice once all arrangements are made, family is hiring [facility].  Consider inpatient hospice if decompensates     Dispo: ok transfer to 4th floor       Chart reviewed, including current vitals, notes, labs and imaging  Pertinent past medical records  reviewed  Labs ordered and medications adjusted as outlined above  Coordinate care with care team/consultants  D/w RN     MDM high  severe acute illness/or exacerbation of chronic illness posing a threat to life, IV medications, requiring close monitoring in hospital.       Results:     Lab Results   Component Value Date    WBC 10.8 05/14/2024    HGB 14.5 05/14/2024    HCT 43.0 05/14/2024    .0 05/14/2024    CREATSERUM 2.15 (H) 05/14/2024    BUN 63 (H) 05/14/2024     (L) 05/14/2024    K 4.5 05/14/2024    CL 92 (L) 05/14/2024    CO2 23.0 05/14/2024     (H) 05/14/2024    CA 8.4 (L) 05/14/2024    ALB 3.5 05/13/2024    ALKPHO 195 (H) 05/11/2024    BILT 1.2 (H) 05/11/2024    TP 5.9 05/11/2024    AST 36 (H) 05/11/2024    ALT 18 05/11/2024    TSH 4.707 05/07/2024    MG 2.9 (H) 05/09/2024    PHOS 5.1 05/13/2024    CK 65 05/07/2024       No results found.

## 2024-05-15 NOTE — PLAN OF CARE
Patient lethargic in bed most of day. Low appetite, very limited intake. No IV, ok per MD. PRN [place] given for generalized pain. Niece visited bedside. Plan to discharge home with hospice tomorrow.     Problem: Patient Centered Care  Goal: Patient preferences are identified and integrated in the patient's plan of care  Description: Interventions:  - What would you like us to know as we care for you? I came from [facility]  - Provide timely, complete, and accurate information to patient/family  - Incorporate patient and family knowledge, values, beliefs, and cultural backgrounds into the planning and delivery of care  - Encourage patient/family to participate in care and decision-making at the level they choose  - Honor patient and family perspectives and choices  Outcome: Not Progressing     Problem: Patient/Family Goals  Goal: Patient/Family Long Term Goal  Description: Patient's Long Term Goal: to stop being short of breath    Interventions:  - IV lasix, renal ultrasound  - See additional Care Plan goals for specific interventions  Outcome: Not Progressing  Goal: Patient/Family Short Term Goal  Description: Patient's Short Term Goal: to stop having pain in my legs    Interventions:   - monitor vitals  - Q 2 turn  - wean oxygen back to baseline  - See additional Care Plan goals for specific interventions  Outcome: Not Progressing     Problem: CARDIOVASCULAR - ADULT  Goal: Maintains optimal cardiac output and hemodynamic stability  Description: INTERVENTIONS:  - Monitor vital signs, rhythm, and trends  - Monitor for bleeding, hypotension and signs of decreased cardiac output  - Evaluate effectiveness of vasoactive medications to optimize hemodynamic stability  - Monitor arterial and/or venous puncture sites for bleeding and/or hematoma  - Assess quality of pulses, skin color and temperature  - Assess for signs of decreased coronary artery perfusion - ex. Angina  - Evaluate fluid balance, assess for edema, trend  weights  Outcome: Not Progressing  Goal: Absence of cardiac arrhythmias or at baseline  Description: INTERVENTIONS:  - Continuous cardiac monitoring, monitor vital signs, obtain 12 lead EKG if indicated  - Evaluate effectiveness of antiarrhythmic and heart rate control medications as ordered  - Initiate emergency measures for life threatening arrhythmias  - Monitor electrolytes and administer replacement therapy as ordered  Outcome: Not Progressing     Problem: RESPIRATORY - ADULT  Goal: Achieves optimal ventilation and oxygenation  Description: INTERVENTIONS:  - Assess for changes in respiratory status  - Assess for changes in mentation and behavior  - Position to facilitate oxygenation and minimize respiratory effort  - Oxygen supplementation based on oxygen saturation or ABGs  - Provide Smoking Cessation handout, if applicable  - Encourage broncho-pulmonary hygiene including cough, deep breathe, Incentive Spirometry  - Assess the need for suctioning and perform as needed  - Assess and instruct to report SOB or any respiratory difficulty  - Respiratory Therapy support as indicated  - Manage/alleviate anxiety  - Monitor for signs/symptoms of CO2 retention  Outcome: Not Progressing     Problem: GENITOURINARY - ADULT  Goal: Absence of urinary retention  Description: INTERVENTIONS:  - Assess patient’s ability to void and empty bladder  - Monitor intake/output and perform bladder scan as needed  - Follow urinary retention protocol/standard of care  - Consider collaborating with pharmacy to review patient's medication profile  - Implement strategies to promote bladder emptying  Outcome: Not Progressing     Problem: METABOLIC/FLUID AND ELECTROLYTES - ADULT  Goal: Glucose maintained within prescribed range  Description: INTERVENTIONS:  - Monitor Blood Glucose as ordered  - Assess for signs and symptoms of hyperglycemia and hypoglycemia  - Administer ordered medications to maintain glucose within target range  - Assess  barriers to adequate nutritional intake and initiate nutrition consult as needed  - Instruct patient on self management of diabetes  Outcome: Not Progressing  Goal: Electrolytes maintained within normal limits  Description: INTERVENTIONS:  - Monitor labs and rhythm and assess patient for signs and symptoms of electrolyte imbalances  - Administer electrolyte replacement as ordered  - Monitor response to electrolyte replacements, including rhythm and repeat lab results as appropriate  - Fluid restriction as ordered  - Instruct patient on fluid and nutrition restrictions as appropriate  Outcome: Not Progressing     Problem: SKIN/TISSUE INTEGRITY - ADULT  Goal: Skin integrity remains intact  Description: INTERVENTIONS  - Assess and document risk factors for pressure ulcer development  - Assess and document skin integrity  - Monitor for areas of redness and/or skin breakdown  - Initiate interventions, skin care algorithm/standards of care as needed  Outcome: Not Progressing     Problem: HEMATOLOGIC - ADULT  Goal: Maintains hematologic stability  Description: INTERVENTIONS  - Assess for signs and symptoms of bleeding or hemorrhage  - Monitor labs and vital signs for trends  - Administer supportive blood products/factors, fluids and medications as ordered and appropriate  - Administer supportive blood products/factors as ordered and appropriate  Outcome: Not Progressing

## 2024-05-15 NOTE — PHYSICAL THERAPY NOTE
Hospice meeting 5/14/24, decision made for transition to Hospice care per notes in EMR.  Will sign off at this time.  Please re order if conditions change.

## 2024-05-15 NOTE — PROGRESS NOTES
[facility]  part of [facility]    Progress Note    Lina Dudley Patient Status:  Observation    1926 MRN [id number]   Location [facility] 3W/SW Attending Mayi Gaxiola MD   Hosp Day # 0 PCP ROBYN KULKARNI MD     Chief complaint: sob  Subjective:     Very short of breath, not feeling well, very frail, placed today on suppl O2  Poor appetite  Very hard of hearing  BP low nl  Urine output low    Objective:   Blood pressure 103/79, pulse 85, temperature 97.3 °F (36.3 °C), temperature source Oral, resp. rate 20, height 5' 7\" (1.702 m), weight 124 lb 8 oz (56.5 kg), SpO2 93%, not currently breastfeeding.    GEN: Looks ill, frail, dyspneic  HEENT: conjunctivae/corneas clear. PERRL, EOM's intact.  Neck: no adenopathy, no carotid bruit, supple  Pulmonary: Diminished, coarse to auscultation bilaterally  Cardiovascular: Irregular  Abdominal: soft, non-tender; bowel sounds normal;   Extremities: no cyanosis, + mild pitting edema  Pulses: palpable and symmetric  Skin: Warm and dry, skin thin, healing bruises  Neurologic: Alert and oriented X 3, hard of hearing, generalized weakness, moves all extremities  Psychiatric: calm, cooperative    Assessment and Plan:   Shortness of breath, severe, multifactorial, out of proportion to level of hypoxemia, on 2 L  CHF exacerbation  COPD exacerbation  Pulmonary hypertension  Possible pneumonia with leukocytosis, slightly elevated procal ~0.18   Chronic respiratory failure, currently at baseline oxygen 2 L  Overall deconditioning, frailty likely contributing to severe dyspnea   -tele  -Gentle diuresis as blood pressure tolerates  -BNP improved when compared to prior, although high  -Telemetry  -Cardiology has been consulted, follow recommendations  -Steroids, nebs, antibiotics/azithromycin  -Diuresis limited by low blood pressure and elevated creatinine  -Nephrology consulted to help with volume status, d/w Dr. Gomez  -started on  midodrine  -recheck CXR 5/13 reviewed-->  1. Probable mild congestive failure with small bilateral pleural effusions.  Approximate 4.3 cm ovoid focus of nodularity at the periphery of the left lung base is unchanged since chest radiograph from 2 days prior and could relate to loculation of   pleural fluid, infection/pneumonia, or even a pleural based mass.  Suggest either radiographic follow-up to resolution or chest CT correlation for complete evaluation.   2. Hyperinflation.  Stable elevation of the left hemidiaphragm.  Stable probable bibasilar atelectasis/scarring.   -add acapella, if able to participate  -scheduled nebs  -overall prognosis poor     Atrial fibrillation, noted last admission  -Sinus rhythm currently  -Telemetry  -Not candidate for anticoagulation given recurrent falls  -metoprolol held     Renal insufficiency/QUITA  Hyponatremia  Hyperkalemia  -Creatinine about the same initially compared to recent admission  -now trending up  -Monitor on diuretics  -nephrology consulted  -check UA, lytes, osmolality  -check renal US-->  1. Kidneys are mildly atrophic bilaterally but normal in echotexture without hydronephrosis or perinephric fluid.   2. Small simple parapelvic renal cysts bilaterally.   3. Unremarkable appearance of the bladder.      Leukocytosis  -Procalcitonin minimally elevated  -No obvious sign/symptom of infection except possibly respiratory     Mild hyperkalemia  -Received Lasix in the ED  -s/p lokelma on 5/12  -Monitor     Hyperlipidemia  HTN  -Resume home medications      DVT prophylaxis: heparin subcutaneous    Social: from rehab    DNAR/select, pt and her niece Christina met with Rand/palliative care/ during the last admission 5/3, consulted  Pt and family also met with hospice today, plans for home hospice in 1-2 days, once all arrangements are made, family is hiring CG     Dispo: ok transfer to 4th floor       Chart reviewed, including current vitals, notes, labs and imaging  Pertinent  past medical records reviewed  Labs ordered and medications adjusted as outlined above  Coordinate care with care team/consultants  D/w RN     [facility]  severe acute illness/or exacerbation of chronic illness posing a threat to life, IV medications, requiring close monitoring in hospital.       Results:     Lab Results   Component Value Date    WBC 10.8 05/14/2024    HGB 14.5 05/14/2024    HCT 43.0 05/14/2024    .0 05/14/2024    CREATSERUM 2.15 (H) 05/14/2024    BUN 63 (H) 05/14/2024     (L) 05/14/2024    K 4.5 05/14/2024    CL 92 (L) 05/14/2024    CO2 23.0 05/14/2024     (H) 05/14/2024    CA 8.4 (L) 05/14/2024    ALB 3.5 05/13/2024    ALKPHO 195 (H) 05/11/2024    BILT 1.2 (H) 05/11/2024    TP 5.9 05/11/2024    AST 36 (H) 05/11/2024    ALT 18 05/11/2024    TSH 4.707 05/07/2024    MG 2.9 (H) 05/09/2024    PHOS 5.1 05/13/2024    CK 65 05/07/2024       XR CHEST AP PORTABLE  (CPT=71045)    Result Date: 5/13/2024  CONCLUSION:  1. Probable mild congestive failure with small bilateral pleural effusions.  Approximate 4.3 cm ovoid focus of nodularity at the periphery of the left lung base is unchanged since chest radiograph from 2 days prior and could relate to loculation of pleural fluid, infection/pneumonia, or even a pleural based mass.  Suggest either radiographic follow-up to resolution or chest CT correlation for complete evaluation. 2. Hyperinflation.  Stable elevation of the left hemidiaphragm.  Stable probable bibasilar atelectasis/scarring.    elm-remote  Dictated by (CST): Rolando Spangler MD on 5/13/2024 at 9:02 AM     Finalized by (CST): Rolando Spangler MD on 5/13/2024 at 9:07 AM                 DENVER GONZALEZ MD  5/14/2024

## 2024-05-15 NOTE — CM/SW NOTE
LSW spoke with pt's niece Christina regarding caregiver for patient. Niece has secured a caregiver and plan is to discharge pt home to private residence tomorrow at 11:30am.     1527: LSW informed MD Mercado, GARRETT Gonzales and AYDEN Kerr of plans to discharge tomorrow.      Per TIARRA Ambrocio  [facility]  [id number]

## 2024-05-16 PROBLEM — J44.9 COPD (CHRONIC OBSTRUCTIVE PULMONARY DISEASE) (HCC): Status: ACTIVE | Noted: 2024-01-01

## 2024-05-16 NOTE — PALLIATIVE CARE NOTE
Plan for dc home with [facility] with CG support today.    I will sign off.    Rand Owen, ANP-BC, ACHPN

## 2024-05-16 NOTE — HOSPICE RN NOTE
[facility] RN to assess patient at bedside prior to discharge home with a change in condition noted. RR 24, labored, moaning with some exhalations, hypothermia with last temp 96.1F and had bare hugger applied since last evening, complaint of pain, unable to report location with furrowed brow and pallor noted. Appears that patient ate a minimal amount of breakfast today per flowsheet and had scheduled PO medications. Patient alert to self, lethargic.    [facility] RN admitted the patient to general inpatient hospice per Dr. Surendra Mercado and Dr. Dewayne Rodarte. Patient is eligible for general inpatient hospice care for symptom management of dyspnea/pain requiring frequent nursing assessments and interventions including titration of IV/PO medications.    Chart to be flipped. Comfort order set to be placed. Last received dose of Norco 5-325 at 0516 today. Regular diet with pleasure feeding. Aspiration precautions in place. PPS: 30%    POC discussed with Genna HENSLEY and Christina Pike. AMBER Stephen and TAISHA Owen notified. All in agreement. [facility] to follow daily to support the patient and family.    Kimberly Headley RN, BSN  Transitional Nurse Liaison  [facility]  [phone] (After-hours)

## 2024-05-16 NOTE — CM/SW NOTE
LSW contacted niece Christina to let her know that patient has declined and that pt is going to remain GIP under [facility] instead of discharging home.LSW informed niketty that [facility] will continue to assess pt daily to determine if pt meets GIP criteria. LSW will continue to offer support to pt and family.       Maribel Oviedo, TIARRA  [facility]  [id number]

## 2024-05-16 NOTE — CDS QUERY
CLINICAL DOCUMENTATION CLARIFICATION FORM  Dear Doctor: Jerson    Please clarify the stage of CKD. Thank you      PLEASE (X) ALL DIAGNOSES THAT APPLY.  SELECTION BY PROVIDER ONLY    ( )  Chronic Kidney Disease Stage 1 (GFR > 90 )     ( )  Chronic Kidney Disease Stage 2 (mild) (GFR of 60-89 )    ( x)  Chronic Kidney Disease Stage 3 (moderate) (GFR 30-59)     ( )  Chronic Kidney Disease Stage 4 (severe) (GFR 15-29)     ( )  Chronic Kidney Disease Stage 5 (GFR < 15 )    ( )  Other (please specify):          Cardio Dr. Nix- QUITA ON CKD  GFR results -- 37--40--47  Risk Factors- CHF EXACERBATION, PULMONARY HTN, COPD   If you have any questions, please contact Clinical :  Anson SUMNER RN at [phone]     Thank You!    THIS FORM IS A PERMANENT PART OF THE MEDICAL RECORD

## 2024-05-16 NOTE — DISCHARGE SUMMARY
[facility]  part of [facility]     DISCHARGE SUMMARY     Lina Dudley Patient Status:  Observation    1926 MRN [id number]   Location [facility] 3W/SW Attending No att. providers found   Hosp Day # 0 PCP ROBYN KULKARNI MD     DATE OF ADMISSION: 2024  DATE OF DISCHARGE: 2024   DISPOSITION: hospice  CONDITION ON DISCHARGE: stable    DISCHARGE DIAGNOSES:    Chronic respiratory failure, unspecified whether with hypoxia or hypercapnia (HCC)    Goals of care, counseling/discussion    Palliative care by specialist    Acute on chronic congestive heart failure, unspecified heart failure type (HCC)    Acute kidney injury (HCC)    COPD exacerbation (HCC)    Hyponatremia    HISTORY OF PRESENT ILLNESS (COPIED FROM ADMISSION H&P)  Lina Dudley is a 89 year old female with history of COPD, hyperlipidemia, A-fib, HTN, CHF, pHTN, chronic respiratory failure who was sent from nursing home today for evaluation of worsening shortness of breath.  Has no chest pain but notes pain in her left shoulder and hip.  This is the third hospitalization this month.  Discharged 2 days ago.  Treated for HFpEF with inpatient diuresis.  No diuretics at discharge due to low blood pressure, QUITA.  No fever, chills, nausea, vomiting or abdominal pain.    HOSPITAL COURSE:  Patient was admitted, treated medically for her heart failure, kidney injury, hypoxemic respiratory failure.  She continued to decline.  Further aggressive measures deferred by patient and family.  Arrangements made for home hospice, however she continued to decline and will be made inpatient hospice at this point according to hospice recommendations.    Patient understands return to the emergency room for increased pain, fever, discharge, shortness of breath, chest pain, new neurologic symptoms, other concerning symptoms.    PHYSICAL EXAM:  Temp:  [96.1 °F (35.6 °C)-97.9 °F (36.6 °C)] 96.1 °F (35.6 °C)  Pulse:  [81-92] 81  Resp:   [16-20] 20  BP: ()/(64-82) 99/65  SpO2:  [94 %-97 %] 96 %  Gen: Lethargic, uncomfortable   HEENT: NCAT, neck supple, no carotid bruit.  CV: RRR, S1S2, and intact distal pulses. No gallop, rub, murmur.  Pulm: Mildly tachypneic.  Effort and breath sounds normal. No distress, wheezes, rales, rhonchi.  Abd: Soft, NTND, BS normal, no mass, no HSM, no rebound/guarding.   Neuro: Moving all 4 extremities  MS: No joint effusions.  No peripheral edema.  Skin: Skin is warm and dry. No rashes, erythema, diaphoresis.   Psych: Appears mildly uncomfortable  DISCHARGE MEDICATIONS     Discharge Medications        START taking these medications        Instructions Prescription details   HYDROcodone-acetaminophen 5-325 MG Tabs  Commonly known as: Norco      Take 1 tablet by mouth every 4 (four) hours as needed.   Quantity: 20 tablet  Refills: 0     midodrine 5 MG Tabs  Commonly known as: ProAmatine      Take 1 tablet (5 mg total) by mouth in the morning and 1 tablet (5 mg total) at noon and 1 tablet (5 mg total) in the evening.   Quantity: 90 tablet  Refills: 0     predniSONE 20 MG Tabs  Commonly known as: Deltasone  Start taking on: May 12, 2024      Take 2 tablets (40 mg total) by mouth daily for 2 days, THEN 1 tablet (20 mg total) daily for 3 days.   Stop taking on: May 17, 2024  Quantity: 7 tablet  Refills: 0            CONTINUE taking these medications        Instructions Prescription details   acetaminophen 500 MG Tabs  Commonly known as: Tylenol Extra Strength      Take 1 tablet (500 mg total) by mouth every 4 (four) hours as needed for Fever (equal to or greater than 100.4).   Refills: 0     albuterol 108 (90 Base) MCG/ACT Aers  Commonly known as: Ventolin HFA      inhale 2 puff by inhalation route  every 4 - 6 hours as needed   Quantity: 1 each  Refills: 5     docusate sodium 100 MG Caps  Commonly known as: COLACE      Take 100 mg by mouth 2 (two) times daily.   Refills: 0     fluticasone furoate-vilanterol 100-25  MCG/ACT Aepb  Commonly known as: Breo Ellipta      Inhale 1 puff into the lungs every morning. To follow with gargle, rinse, & spit after using BREO to help reduce your chance of getting thrush.   Quantity: 1 each  Refills: 5     ipratropium-albuterol 0.5-2.5 (3) MG/3ML Soln  Commonly known as: Duoneb      Take 3 mL by nebulization every 6 (six) hours as needed.   Refills: 0     metoprolol tartrate 25 MG Tabs  Commonly known as: Lopressor      Take 0.5 tablets (12.5 mg total) by mouth 2x Daily(Beta Blocker).   Quantity: 60 tablet  Refills: 0     Polyethylene Glycol 3350 17 g Pack  Commonly known as: MIRALAX      Take 17 g by mouth daily as needed.   Refills: 0            STOP taking these medications      atorvastatin 10 MG Tabs  Commonly known as: Lipitor                  Where to Get Your Medications        These medications were sent to [id number] - [place], [place] - [address] [facility] [facility] [phone], [phone]  [address] [place] [id number]      Phone: [phone]   HYDROcodone-acetaminophen 5-325 MG Tabs  midodrine 5 MG Tabs  predniSONE 20 MG Tabs         CONSULTANTS  Consultants         Provider   Role Specialty     Melida Gomez MD      Consulting Physician NEPHROLOGY     Van Nix MD      Consulting Physician Cardiovascular Diseases            FOLLOW UP:  Asha Mendes MD  [address] [place] [id number]  [phone]    Call      [address]  [address]  [facility] [address]  Follow up in 1 week(s)      The above plan and follow-up instructions were reviewed with the patient and they verbalized understanding and agreement.  They understand to return to the emergency room for any concerning signs or symptoms.  Greater than 30 minutes spent on discharge.  -----------------------    Hospital Discharge Diagnoses:  hospice    Lace+ Score: 76  59-90 High Risk  29-58 Medium Risk  0-28   Low Risk.    TCM Follow-Up Recommendation:  LACE > 58: High  Risk of readmission after discharge from the hospital.

## 2024-05-16 NOTE — PLAN OF CARE
Patient clearance for home hospice is canceled. Patient will go inpatient hospice. All safety measures are in place and call light is within reach.    Problem: Patient Centered Care  Goal: Patient preferences are identified and integrated in the patient's plan of care  Description: Interventions:  - What would you like us to know as we care for you? I came from [facility]  - Provide timely, complete, and accurate information to patient/family  - Incorporate patient and family knowledge, values, beliefs, and cultural backgrounds into the planning and delivery of care  - Encourage patient/family to participate in care and decision-making at the level they choose  - Honor patient and family perspectives and choices  Outcome: Completed     Problem: Patient/Family Goals  Goal: Patient/Family Long Term Goal  Description: Patient's Long Term Goal: to stop being short of breath    Interventions:  - IV lasix, renal ultrasound  - See additional Care Plan goals for specific interventions  Outcome: Completed  Goal: Patient/Family Short Term Goal  Description: Patient's Short Term Goal: to stop having pain in my legs    Interventions:   - monitor vitals  - Q 2 turn  - wean oxygen back to baseline  - See additional Care Plan goals for specific interventions  Outcome: Completed     Problem: CARDIOVASCULAR - ADULT  Goal: Maintains optimal cardiac output and hemodynamic stability  Description: INTERVENTIONS:  - Monitor vital signs, rhythm, and trends  - Monitor for bleeding, hypotension and signs of decreased cardiac output  - Evaluate effectiveness of vasoactive medications to optimize hemodynamic stability  - Monitor arterial and/or venous puncture sites for bleeding and/or hematoma  - Assess quality of pulses, skin color and temperature  - Assess for signs of decreased coronary artery perfusion - ex. Angina  - Evaluate fluid balance, assess for edema, trend weights  Outcome: Completed  Goal: Absence of cardiac arrhythmias or at  baseline  Description: INTERVENTIONS:  - Continuous cardiac monitoring, monitor vital signs, obtain 12 lead EKG if indicated  - Evaluate effectiveness of antiarrhythmic and heart rate control medications as ordered  - Initiate emergency measures for life threatening arrhythmias  - Monitor electrolytes and administer replacement therapy as ordered  Outcome: Completed     Problem: RESPIRATORY - ADULT  Goal: Achieves optimal ventilation and oxygenation  Description: INTERVENTIONS:  - Assess for changes in respiratory status  - Assess for changes in mentation and behavior  - Position to facilitate oxygenation and minimize respiratory effort  - Oxygen supplementation based on oxygen saturation or ABGs  - Provide Smoking Cessation handout, if applicable  - Encourage broncho-pulmonary hygiene including cough, deep breathe, Incentive Spirometry  - Assess the need for suctioning and perform as needed  - Assess and instruct to report SOB or any respiratory difficulty  - Respiratory Therapy support as indicated  - Manage/alleviate anxiety  - Monitor for signs/symptoms of CO2 retention  Outcome: Completed     Problem: GENITOURINARY - ADULT  Goal: Absence of urinary retention  Description: INTERVENTIONS:  - Assess patient’s ability to void and empty bladder  - Monitor intake/output and perform bladder scan as needed  - Follow urinary retention protocol/standard of care  - Consider collaborating with pharmacy to review patient's medication profile  - Implement strategies to promote bladder emptying  Outcome: Completed     Problem: METABOLIC/FLUID AND ELECTROLYTES - ADULT  Goal: Glucose maintained within prescribed range  Description: INTERVENTIONS:  - Monitor Blood Glucose as ordered  - Assess for signs and symptoms of hyperglycemia and hypoglycemia  - Administer ordered medications to maintain glucose within target range  - Assess barriers to adequate nutritional intake and initiate nutrition consult as needed  - Instruct  patient on self management of diabetes  Outcome: Completed  Goal: Electrolytes maintained within normal limits  Description: INTERVENTIONS:  - Monitor labs and rhythm and assess patient for signs and symptoms of electrolyte imbalances  - Administer electrolyte replacement as ordered  - Monitor response to electrolyte replacements, including rhythm and repeat lab results as appropriate  - Fluid restriction as ordered  - Instruct patient on fluid and nutrition restrictions as appropriate  Outcome: Completed     Problem: SKIN/TISSUE INTEGRITY - ADULT  Goal: Skin integrity remains intact  Description: INTERVENTIONS  - Assess and document risk factors for pressure ulcer development  - Assess and document skin integrity  - Monitor for areas of redness and/or skin breakdown  - Initiate interventions, skin care algorithm/standards of care as needed  Outcome: Completed     Problem: HEMATOLOGIC - ADULT  Goal: Maintains hematologic stability  Description: INTERVENTIONS  - Assess for signs and symptoms of bleeding or hemorrhage  - Monitor labs and vital signs for trends  - Administer supportive blood products/factors, fluids and medications as ordered and appropriate  - Administer supportive blood products/factors as ordered and appropriate  Outcome: Completed     Problem: MUSCULOSKELETAL - ADULT  Goal: Return mobility to safest level of function  Description: INTERVENTIONS:  - Assess patient stability and activity tolerance for standing, transferring and ambulating w/ or w/o assistive devices  - Assist with transfers and ambulation using safe patient handling equipment as needed  - Ensure adequate protection for wounds/incisions during mobilization  - Obtain PT/OT consults as needed  - Advance activity as appropriate  - Communicate ordered activity level and limitations with patient/family  Outcome: Completed

## 2024-05-16 NOTE — CDS QUERY
CLINICAL DOCUMENTATION CLARIFICATION FORM  Dear Doctor: eJrson    Please clarified if the patient has Hypertension as well as Pulmonary Hypertension.     SELECTION BY PROVIDER ONLY      (x )  Yes, the patient has Essential Hypertension     ( )  No, the patient does not have Essential Hypertension     ( )  Other (please specify):       Documentation includes Hypertension:   Admitted for Exacerbation of Diastolic Heart Failure   Risk Factors- HLD, QUITA, pulmonary htn, chronic home oxygen for copd   Home meds include: Metoprolol, Midodrine     If you have any questions, please contact Clinical :  Anson SUMNER RN at [phone]     Thank You!    THIS FORM IS A PERMANENT PART OF THE MEDICAL RECORD

## 2024-05-16 NOTE — CM/SW NOTE
05/16/24 1000   Discharge disposition   Expected discharge disposition Hospice/Home   Post Acute Care Provider [facility]   Discharge transportation [facility]     The patient received a MDO for discharge.    The patient will be transported home via [facility] at 11:30am.  The [facility] will [facility] with [facility].    The patient's family has secured a caregiver.    The patient and family have no questions or concerns at this time.    SW/CM to remain available for support and/or discharge planning.     Gemini Stephen MSW, LSW  Discharge Planner [id number]

## 2024-05-16 NOTE — PLAN OF CARE
Patient lethargic, oriented to self. Poor appetite. Low body temperature - chance hugger in place. Mita care and turn Q2H for provide comfort. PRN pain medication administered. Safety precaution maintained. Plan discharge at 1130am with home hospice.     Problem: Patient Centered Care  Goal: Patient preferences are identified and integrated in the patient's plan of care  Description: Interventions:  - What would you like us to know as we care for you? I came from [facility]  - Provide timely, complete, and accurate information to patient/family  - Incorporate patient and family knowledge, values, beliefs, and cultural backgrounds into the planning and delivery of care  - Encourage patient/family to participate in care and decision-making at the level they choose  - Honor patient and family perspectives and choices  Outcome: Progressing     Problem: Patient/Family Goals  Goal: Patient/Family Long Term Goal  Description: Patient's Long Term Goal: to stop being short of breath    Interventions:  - IV lasix, renal ultrasound  - See additional Care Plan goals for specific interventions  Outcome: Progressing  Goal: Patient/Family Short Term Goal  Description: Patient's Short Term Goal: to stop having pain in my legs    Interventions:   - monitor vitals  - Q 2 turn  - wean oxygen back to baseline  - See additional Care Plan goals for specific interventions  Outcome: Progressing     Problem: CARDIOVASCULAR - ADULT  Goal: Maintains optimal cardiac output and hemodynamic stability  Description: INTERVENTIONS:  - Monitor vital signs, rhythm, and trends  - Monitor for bleeding, hypotension and signs of decreased cardiac output  - Evaluate effectiveness of vasoactive medications to optimize hemodynamic stability  - Monitor arterial and/or venous puncture sites for bleeding and/or hematoma  - Assess quality of pulses, skin color and temperature  - Assess for signs of decreased coronary artery perfusion - ex. Angina  - Evaluate  fluid balance, assess for edema, trend weights  Outcome: Progressing  Goal: Absence of cardiac arrhythmias or at baseline  Description: INTERVENTIONS:  - Continuous cardiac monitoring, monitor vital signs, obtain 12 lead EKG if indicated  - Evaluate effectiveness of antiarrhythmic and heart rate control medications as ordered  - Initiate emergency measures for life threatening arrhythmias  - Monitor electrolytes and administer replacement therapy as ordered  Outcome: Progressing     Problem: RESPIRATORY - ADULT  Goal: Achieves optimal ventilation and oxygenation  Description: INTERVENTIONS:  - Assess for changes in respiratory status  - Assess for changes in mentation and behavior  - Position to facilitate oxygenation and minimize respiratory effort  - Oxygen supplementation based on oxygen saturation or ABGs  - Provide Smoking Cessation handout, if applicable  - Encourage broncho-pulmonary hygiene including cough, deep breathe, Incentive Spirometry  - Assess the need for suctioning and perform as needed  - Assess and instruct to report SOB or any respiratory difficulty  - Respiratory Therapy support as indicated  - Manage/alleviate anxiety  - Monitor for signs/symptoms of CO2 retention  Outcome: Progressing     Problem: GENITOURINARY - ADULT  Goal: Absence of urinary retention  Description: INTERVENTIONS:  - Assess patient’s ability to void and empty bladder  - Monitor intake/output and perform bladder scan as needed  - Follow urinary retention protocol/standard of care  - Consider collaborating with pharmacy to review patient's medication profile  - Implement strategies to promote bladder emptying  Outcome: Progressing     Problem: METABOLIC/FLUID AND ELECTROLYTES - ADULT  Goal: Glucose maintained within prescribed range  Description: INTERVENTIONS:  - Monitor Blood Glucose as ordered  - Assess for signs and symptoms of hyperglycemia and hypoglycemia  - Administer ordered medications to maintain glucose within  target range  - Assess barriers to adequate nutritional intake and initiate nutrition consult as needed  - Instruct patient on self management of diabetes  Outcome: Progressing  Goal: Electrolytes maintained within normal limits  Description: INTERVENTIONS:  - Monitor labs and rhythm and assess patient for signs and symptoms of electrolyte imbalances  - Administer electrolyte replacement as ordered  - Monitor response to electrolyte replacements, including rhythm and repeat lab results as appropriate  - Fluid restriction as ordered  - Instruct patient on fluid and nutrition restrictions as appropriate  Outcome: Progressing     Problem: SKIN/TISSUE INTEGRITY - ADULT  Goal: Skin integrity remains intact  Description: INTERVENTIONS  - Assess and document risk factors for pressure ulcer development  - Assess and document skin integrity  - Monitor for areas of redness and/or skin breakdown  - Initiate interventions, skin care algorithm/standards of care as needed  Outcome: Progressing     Problem: HEMATOLOGIC - ADULT  Goal: Maintains hematologic stability  Description: INTERVENTIONS  - Assess for signs and symptoms of bleeding or hemorrhage  - Monitor labs and vital signs for trends  - Administer supportive blood products/factors, fluids and medications as ordered and appropriate  - Administer supportive blood products/factors as ordered and appropriate  Outcome: Progressing     Problem: MUSCULOSKELETAL - ADULT  Goal: Return mobility to safest level of function  Description: INTERVENTIONS:  - Assess patient stability and activity tolerance for standing, transferring and ambulating w/ or w/o assistive devices  - Assist with transfers and ambulation using safe patient handling equipment as needed  - Ensure adequate protection for wounds/incisions during mobilization  - Obtain PT/OT consults as needed  - Advance activity as appropriate  - Communicate ordered activity level and limitations with patient/family  Outcome:  Progressing

## 2024-05-16 NOTE — SPIRITUAL CARE NOTE
Spiritual Care Visit Note    Patient Name: Lina Dudley Date of Spiritual Care Visit: 24   : 1926 Primary Dx: <principal problem not specified>       Referred By:      Spiritual Care Taxonomy:                   Visit Type/Summary:    Father Jose David contacted the spiritual care dept to inform us that he will be coming to offer anointing of the sick.    Chaplain Rausch [phone]

## 2024-05-17 NOTE — HOSPICE RN NOTE
[facility] Inpatient Nursing Rounds:     Assessment: No family present during visit. Pt was initially sleeping when entering the room. She woke during assessment. Pt seemed confused, but is known to be hard of hearing. She was receiving morphine drip for management of pain and control of SOB. Pt was also on 2L O2 NC. RR=20 and diminished. Pt's dunham was intact and draining.      Medications: Scopolamine patch, MS gtt 100mg/ml at 1mg/hr, and MS oral solution 10 mg x2 were all given within the last 24 hours to control S/S.     PPS: 30%    Patient remains eligible for general inpatient hospice care for symptom management of pain and SOB requiring frequent nursing assessments and interventions including titration of IV medications. POC discussed with AYDEN Red. All in agreement. [facility] will continue to support the patient and family.    Flynn Proctor RN  [facility] SOC  [phone] or [phone] for after-hours

## 2024-05-17 NOTE — PLAN OF CARE
Patient resting in bed; very lethargic, not responding to questions, only moans with repositioning. Morphine drip initiated due to patient moaning and grimacing with touching/repositioning. Bonner in place. Oxygen 2L via NC. Bath completed. Patient with a BM overnight. Bed locked and in lowest position, frequent rounds made.  Problem: COPING  Goal: Pt/Family able to verbalize concerns and demonstrate effective coping strategies  Description: INTERVENTIONS:  - Assist patient/family to identify coping skills, available support systems and cultural and spiritual values  - Provide emotional support, including active listening and acknowledgement of concerns of patient and caregivers  - Reduce environmental stimuli, as able  - Instruct patient/family in relaxation techniques, as appropriate  - Assess for spiritual and psychosocial needs and initiate Spiritual Care or Behavioral Health consult as needed  Outcome: Progressing     Problem: DEATH & DYING  Goal: Pt/Family communicate acceptance of impending death and feel psychological comfort and peace  Description: INTERVENTIONS:  - Assess patient/family anxiety and grief process related to end of life issues  - Provide emotional and spiritual support  - Provide information about the patient’s health status with consideration of family and cultural values  - Communicate willingness to discuss death and facilitate grief process  with patient/family as appropriate  - Emphasize sustaining relationships within family system and community, or barrett/spiritual traditions  - Initiate Spiritual Care consult as needed  Outcome: Progressing

## 2024-05-17 NOTE — H&P
[facility]  part of [facility]  HISTORY AND PHYSICAL       Lina Dudley Patient Status:  Inpatient    1926  89 year old CSN [id number]   Location 471/471-A Attending Surendra Mercado MD     PCP ROBYN KULKARNI MD     ASSESSMENT/PLAN      Chronic respiratory failure, unspecified whether with hypoxia or hypercapnia (HCC)    Goals of care, counseling/discussion    Palliative care by specialist    Acute on chronic congestive heart failure, unspecified heart failure type (HCC)    Acute kidney injury (HCC)    COPD exacerbation (HCC)    Hyponatremia    Continue comfort care measures, hospice care.  Hospice team following.  Patient appears very comfortable this morning.  Agitation from previous day now resolved.  Discussed with niece at the bedside, comfort given.    DATE OF ADMISSION: 24    CHIEF COMPLAINT: Inpatient hospice care    HISTORY OF PRESENT ILLNESS  Lina Dudley is a 89 year old female with history of COPD, hyperlipidemia, A-fib, HTN, CHF, pHTN, chronic respiratory failure who was sent from nursing home today for evaluation of worsening shortness of breath.  Has no chest pain but notes pain in her left shoulder and hip.  This is the third hospitalization this month.  Discharged 2 days ago.  Treated for HFpEF with inpatient diuresis.  No diuretics at discharge due to low blood pressure, QUITA.  No fever, chills, nausea, vomiting or abdominal pain.    Patient was admitted, treated medically for her heart failure, kidney injury, hypoxemic respiratory failure.  She continued to decline.  Further aggressive measures deferred by patient and family.  Arrangements made for home hospice, however she continued to decline and will be made inpatient hospice at this point according to hospice recommendations.    PAST MEDICAL HISTORY  Past Medical History:    Appendicitis    APPENDECTOMY    Arthritis    Basal cell carcinoma    Of Face     Chicken pox    COPD (chronic obstructive  pulmonary disease) (HCC)    Left arm numbness    Measles    Other and unspecified hyperlipidemia    Pneumonia    Tonsillitis    Unspecified essential hypertension        PAST SURGICAL HISTORY  Past Surgical History:   Procedure Laterality Date    Appendectomy      Cataract extraction Bilateral     Dr. Tirado's office    Glaucoma surgery      Hip replacement surgery      Skin surgery  Excision of Basal cell carcinoma of face     Tonsillectomy      T&A       ALLERGIES   Bacitracin, Neosporin original [neomycin-bacitracin-polymyxin], and Polymyxin b    MEDICATIONS  Current Discharge Medication List        CONTINUE these medications which have NOT CHANGED    Details   HYDROcodone-acetaminophen 5-325 MG Oral Tab Take 1 tablet by mouth every 4 (four) hours as needed.  Qty: 20 tablet, Refills: 0    Associated Diagnoses: Sciatica of right side      midodrine 5 MG Oral Tab Take 1 tablet (5 mg total) by mouth in the morning and 1 tablet (5 mg total) at noon and 1 tablet (5 mg total) in the evening.  Qty: 90 tablet, Refills: 0      predniSONE 20 MG Oral Tab Take 2 tablets (40 mg total) by mouth daily for 2 days, THEN 1 tablet (20 mg total) daily for 3 days.  Qty: 7 tablet, Refills: 0      metoprolol tartrate 25 MG Oral Tab Take 0.5 tablets (12.5 mg total) by mouth 2x Daily(Beta Blocker).  Qty: 60 tablet, Refills: 0      ipratropium-albuterol 0.5-2.5 (3) MG/3ML Inhalation Solution Take 3 mL by nebulization every 6 (six) hours as needed.      polyethylene glycol, PEG 3350, 17 g Oral Powd Pack Take 17 g by mouth daily as needed.      docusate sodium 100 MG Oral Cap Take 100 mg by mouth 2 (two) times daily.      albuterol 108 (90 Base) MCG/ACT Inhalation Aero Soln inhale 2 puff by inhalation route  every 4 - 6 hours as needed  Qty: 1 each, Refills: 5      fluticasone furoate-vilanterol (BREO ELLIPTA) 100-25 MCG/ACT Inhalation Aerosol Powder, Breath Activated Inhale 1 puff into the lungs every morning. To follow with gargle, rinse,  & spit after using BREO to help reduce your chance of getting thrush.  Qty: 1 each, Refills: 5      acetaminophen 500 MG Oral Tab Take 1 tablet (500 mg total) by mouth every 4 (four) hours as needed for Fever (equal to or greater than 100.4).  Refills: 0             SOCIAL HISTORY  Social History     Socioeconomic History    Marital status: Single   Tobacco Use    Smoking status: Former     Current packs/day: 0.00     Average packs/day: 1 pack/day for 30.0 years (30.0 ttl pk-yrs)     Types: Cigarettes     Start date: 1960     Quit date: 1990     Years since quittin.3    Smokeless tobacco: Former   Vaping Use    Vaping status: Never Used   Substance and Sexual Activity    Alcohol use: No     Alcohol/week: 2.0 standard drinks of alcohol     Types: 2 Glasses of wine per week    Drug use: No   Other Topics Concern    Caffeine Concern Yes     Comment: COFFEE 3 CUPS/DAY       FAMILY HISTORY  Family History   Problem Relation Age of Onset    Cancer Father     Hypertension Father     Heart Disease Father     Heart Disease Mother     Gastro-Intestinal Disorder Maternal Grandmother         TUBERCULOSIS    Breast Cancer Maternal Aunt         80s    Neurological Disorder Other         GREAT AUNT, EPILEPSY    Lipids Other         HYPERLIPIDEMIA    Glaucoma Neg     Diabetes Neg        REVIEW OF SYSTEMS  Not possible    PHYSICAL EXAM  Vital signs:  BP (!) 87/64 (BP Location: Left arm)   Pulse 81   Temp 97.6 °F (36.4 °C) (Axillary)   Resp (!) 6   SpO2 100%   Gen: Nonresponsive  HEENT: NCAT, neck supple, no carotid bruit.  CV: RRR  Pulm: Effort and breath sounds normal  Abd: Soft, NTND  Neuro: Not examined  Skin: Skin is warm and dry. No rashes, erythema, diaphoresis.   MS: No open wounds, no joint effusions.  No edema  Psych: Deferred    Data:  Recent Labs   Lab 24  1836 24  0607 24  0536   RBC 4.45 4.28 4.41   HGB 14.3 14.4 14.5   HCT 43.7 42.5 43.0   MCV 98.2 99.3 97.5   MCH 32.1 33.6 32.9    MCHC 32.7 33.9 33.7   RDW 16.3* 15.8* 15.9*   NEPRELIM 10.35* 7.10 8.82*   WBC 13.2* 8.7 10.8   .0 188.0 217.0     Recent Labs   Lab 05/11/24  1836 05/12/24  1606 05/12/24  2226 05/13/24  0607 05/14/24  0536   * 158*  --  136* 111*   BUN 50* 41*  --  56* 63*   CREATSERUM 1.32* 1.50*  --  1.77* 2.15*   CA 8.4* 8.4*  --  8.5* 8.4*   ALB 3.5 3.5  --  3.5  --    * 126*  --  127* 125*   K 5.2* 5.5* 5.4* 5.4* 4.5   CL 99 98  --  94* 92*   CO2 25.0 19.0*  --  24.0 23.0   ALKPHO 195*  --   --   --   --    AST 36*  --   --   --   --    ALT 18  --   --   --   --    BILT 1.2*  --   --   --   --    TP 5.9  --   --   --   --

## 2024-05-17 NOTE — PLAN OF CARE
Pt appears comfortable, no s/s of distress. Morphine gtt infusing, family @ BS. Offered emotional support to pt and family.   Problem: COPING  Goal: Pt/Family able to verbalize concerns and demonstrate effective coping strategies  Description: INTERVENTIONS:  - Assist patient/family to identify coping skills, available support systems and cultural and spiritual values  - Provide emotional support, including active listening and acknowledgement of concerns of patient and caregivers  - Reduce environmental stimuli, as able  - Instruct patient/family in relaxation techniques, as appropriate  - Assess for spiritual and psychosocial needs and initiate Spiritual Care or Behavioral Health consult as needed  Outcome: Progressing     Problem: DEATH & DYING  Goal: Pt/Family communicate acceptance of impending death and feel psychological comfort and peace  Description: INTERVENTIONS:  - Assess patient/family anxiety and grief process related to end of life issues  - Provide emotional and spiritual support  - Provide information about the patient’s health status with consideration of family and cultural values  - Communicate willingness to discuss death and facilitate grief process  with patient/family as appropriate  - Emphasize sustaining relationships within family system and community, or barrett/spiritual traditions  - Initiate Spiritual Care consult as needed  Outcome: Progressing

## 2024-05-17 NOTE — PLAN OF CARE
Patient transferred from .  Patient resting comfortably.  Continues on 1L o2.  Bonner in place.  Comfort measures maintained.

## 2024-05-17 NOTE — CM/SW NOTE
LSW pt visit. Pt was lying on her back in bed resting and appeared comfortable. Pt's niece Christina was at bedside. LSW offered emotional support to niece. LSW will continue to follow up with niece as needed to offer comfort and support.       Maribel Oviedo, LSW  [facility]  [id number]

## 2024-05-18 NOTE — PROGRESS NOTES
[facility]  part of [facility]  Hospitalist Progress Note     Lina Dudley Patient Status:  Inpatient    1926  89 year old CSN [id number]   Location 471/471-A Attending Surendra Mercado MD   Hosp Day # 2 PCP ROBYN KUKLARNI MD     Assessment & Plan:   ----------------------------------    Chronic respiratory failure, unspecified whether with hypoxia or hypercapnia (HCC)    Goals of care, counseling/discussion    Palliative care by specialist    Acute on chronic congestive heart failure, unspecified heart failure type (HCC)    Acute kidney injury (HCC)    COPD exacerbation (HCC)    Hyponatremia    Continue comfort care measures, hospice care.  Hospice team following.  Patient appears very comfortable this morning.  Doing well on morphine gtt. Discussed with tona at the bedside , comfort given.    Subjective:   ----------------------------------  No events overnight. No one at bedside      Objective:   Chief Complaint: No chief complaint on file.    ----------------------------------  Temp:  [98.2 °F (36.8 °C)] 98.2 °F (36.8 °C)  Pulse:  [71-73] 71  Resp:  [4-6] 6  BP: (70-78)/(41-52) 70/41  SpO2:  [82 %-92 %] 92 %  Gen: Nonresponsive  HEENT: NCAT, neck supple, no carotid bruit.  CV: RRR  Pulm: Effort and breath sounds normal  Abd: Soft, NTND  Neuro: Not examined  Skin: Skin is warm and dry. No rashes, erythema, diaphoresis.   MS: No open wounds, no joint effusions.  No edema  Psych:   Deferred    Labs:  Lab Results   Component Value Date    HGB 14.5 2024    WBC 10.8 2024    .0 2024     (L) 2024    K 4.5 2024    CREATSERUM 2.15 (H) 2024    AST 36 (H) 2024    ALT 18 2024          scopolamine  1 patch Transdermal Q72H       sodium chloride 0.9%    acetaminophen    morphINE    morphINE    morphINE in sodium chloride 0.9%    LORazepam Intensol    haloperidol lactate **OR** haloperidol lactate    albuterol    furosemide     glycopyrrolate    bisacodyl    fleet enema    ondansetron  **Certification      PHYSICIAN Certification of Need for Inpatient Hospitalization - Initial Certification    Patient will require inpatient services that will reasonably be expected to span two midnight's based on the clinical documentation in H+P.   Based on patients current state of illness, I anticipate that, after discharge, patient will require TBD.

## 2024-05-18 NOTE — PLAN OF CARE
Pt appears comfortable, unable to follow commands, morphine gtt infusing @ 1ml/hour. Offered emotional support to pt, will continue to monitor for s/s of pain or discomfort.   Problem: COPING  Goal: Pt/Family able to verbalize concerns and demonstrate effective coping strategies  Description: INTERVENTIONS:  - Assist patient/family to identify coping skills, available support systems and cultural and spiritual values  - Provide emotional support, including active listening and acknowledgement of concerns of patient and caregivers  - Reduce environmental stimuli, as able  - Instruct patient/family in relaxation techniques, as appropriate  - Assess for spiritual and psychosocial needs and initiate Spiritual Care or Behavioral Health consult as needed  Outcome: Progressing     Problem: DEATH & DYING  Goal: Pt/Family communicate acceptance of impending death and feel psychological comfort and peace  Description: INTERVENTIONS:  - Assess patient/family anxiety and grief process related to end of life issues  - Provide emotional and spiritual support  - Provide information about the patient’s health status with consideration of family and cultural values  - Communicate willingness to discuss death and facilitate grief process  with patient/family as appropriate  - Emphasize sustaining relationships within family system and community, or barrett/spiritual traditions  - Initiate Spiritual Care consult as needed  Outcome: Progressing     Problem: PAIN - ADULT  Goal: Verbalizes/displays adequate comfort level or patient's stated pain goal  Description: INTERVENTIONS:  - Encourage pt to monitor pain and request assistance  - Assess pain using appropriate pain scale  - Administer analgesics based on type and severity of pain and evaluate response  - Implement non-pharmacological measures as appropriate and evaluate response  - Consider cultural and social influences on pain and pain management  - Manage/alleviate anxiety  -  Utilize distraction and/or relaxation techniques  - Monitor for opioid side effects  - Notify MD/LIP if interventions unsuccessful or patient reports new pain  - Anticipate increased pain with activity and pre-medicate as appropriate  Outcome: Progressing     Problem: SAFETY ADULT - FALL  Goal: Free from fall injury  Description: INTERVENTIONS:  - Assess pt frequently for physical needs  - Identify cognitive and physical deficits and behaviors that affect risk of falls.  - [place] fall precautions as indicated by assessment.  - Educate pt/family on patient safety including physical limitations  - Instruct pt to call for assistance with activity based on assessment  - Modify environment to reduce risk of injury  - Provide assistive devices as appropriate  - Consider OT/PT consult to assist with strengthening/mobility  - Encourage toileting schedule  Outcome: Progressing

## 2024-05-18 NOTE — PLAN OF CARE
Patient is non responsive and STEPHANY orientation. Morphine gtt running at 1 mg/hr. Bonner in place. Pt Cdiff positive. Comfort care provided. Repositioned as tolerated. 2L for comfort. Safety precautions in place. Call light within reach.   Problem: COPING  Goal: Pt/Family able to verbalize concerns and demonstrate effective coping strategies  Description: INTERVENTIONS:  - Assist patient/family to identify coping skills, available support systems and cultural and spiritual values  - Provide emotional support, including active listening and acknowledgement of concerns of patient and caregivers  - Reduce environmental stimuli, as able  - Instruct patient/family in relaxation techniques, as appropriate  - Assess for spiritual and psychosocial needs and initiate Spiritual Care or Behavioral Health consult as needed  Outcome: Progressing     Problem: DEATH & DYING  Goal: Pt/Family communicate acceptance of impending death and feel psychological comfort and peace  Description: INTERVENTIONS:  - Assess patient/family anxiety and grief process related to end of life issues  - Provide emotional and spiritual support  - Provide information about the patient’s health status with consideration of family and cultural values  - Communicate willingness to discuss death and facilitate grief process  with patient/family as appropriate  - Emphasize sustaining relationships within family system and community, or barrett/spiritual traditions  - Initiate Spiritual Care consult as needed  Outcome: Progressing

## 2024-05-18 NOTE — HOSPICE RN NOTE
[facility] nurse visit: Pt is in bed, unresponsive to tactile/verbal stimuli.  Morphine GTT infusing at 1 mg/hr for pain/dyspnea, Scop patch in place for secretions.  Interventions appear to be effective at this time, Pt is w/out S/S of pain or dyspnea at this visit.  Lung sounds are diminished/clear.  Respirations are 5/min. 2+ edema to bilateral arms, left radial pulse is absent right is very weak.  Edema to bilateral lower legs, w/mottling to bilateral feet and feet are cold to the touch.  Bilateral pedal pulses are absent.  No one is at bedside.  Pt remains GIP LOC appropriate for frequent O/A and treat for pain dyspnea/EOL care.  POC discussed w/floor nurse, Neda who is in agreement.    Mariah Robertson RN  [phone]

## 2024-05-19 NOTE — HOSPICE RN NOTE
[facility] nurse visit: Brief visit made.  Pt remains unresponsive to tactile/verbal stimuli.  Morphine GTT infusing well at 1 mg/hr, Scop patch in place.  Bonner draining scant urine.   No one at bedside.  POC discussed w/floor nurse, Maureen who is in agreement.    Mariah Robertson RN  [facility]   [phone]

## 2024-05-19 NOTE — HOSPICE RN NOTE
[facility] visit: Pt. Is unresponsive to verbal/tactile stimuli.  Morphing GTT continues at 1mg/hr.  Bonner in place draining scant martita urine.  Lungs are diminished/respirations apneic and very shallow at 3/min.  Family at bedside emotional support via active listening/time for questions, none voiced.  Family is encouraged to use nurse call light for any questions/needs. POC discussed w/floor nurseDolores who is in agreement.    Mariah Robertson RN  [facility]  [phone]

## 2024-05-19 NOTE — SPIRITUAL CARE NOTE
Spiritual Care Visit Note    Patient Name: Lina Dudley Date of Spiritual Care Visit: 24   : 1926 Primary Dx: <principal problem not specified>       Referred By: Referral From: Nurse    Spiritual Care Taxonomy:    Intended Effects: Demonstrate caring and concern    Methods: Offer spiritual/Evangelical support;Offer support    Interventions: [place];Acknowledge response to difficult experience;Active listening;Respond as  to a defined crisis event    Visit Type/Summary:     - Spiritual Care: Responded to a request via the on call phone Offered empathic listening and emotional support. Provided resources related to grief and grieving. Provided information regarding grief support groups. Patient and family expressed appreciation for  visit. Provided support for Patient's spiritual/Evangelical requests. Offered prayer.    remains available for follow up.    Spiritual Care support can be requested via an Epic consult. For urgent/immediate needs, please contact the On Call  at: [place]: ext [phone]    Rev. Jody Sam

## 2024-05-19 NOTE — PLAN OF CARE
Patient is non responsive and STEPHANY orientation. Morphine gtt running at 1 mg/hr. Pt had a BM this morning. Bonner in place. Comfort care provided. Repositioned as tolerated. 2L for comfort. Safety precautions in place. Call light within reach.     Problem: COPING  Goal: Pt/Family able to verbalize concerns and demonstrate effective coping strategies  Description: INTERVENTIONS:  - Assist patient/family to identify coping skills, available support systems and cultural and spiritual values  - Provide emotional support, including active listening and acknowledgement of concerns of patient and caregivers  - Reduce environmental stimuli, as able  - Instruct patient/family in relaxation techniques, as appropriate  - Assess for spiritual and psychosocial needs and initiate Spiritual Care or Behavioral Health consult as needed  Outcome: Progressing     Problem: DEATH & DYING  Goal: Pt/Family communicate acceptance of impending death and feel psychological comfort and peace  Description: INTERVENTIONS:  - Assess patient/family anxiety and grief process related to end of life issues  - Provide emotional and spiritual support  - Provide information about the patient’s health status with consideration of family and cultural values  - Communicate willingness to discuss death and facilitate grief process  with patient/family as appropriate  - Emphasize sustaining relationships within family system and community, or barrett/spiritual traditions  - Initiate Spiritual Care consult as needed  Outcome: Progressing     Problem: PAIN - ADULT  Goal: Verbalizes/displays adequate comfort level or patient's stated pain goal  Description: INTERVENTIONS:  - Encourage pt to monitor pain and request assistance  - Assess pain using appropriate pain scale  - Administer analgesics based on type and severity of pain and evaluate response  - Implement non-pharmacological measures as appropriate and evaluate response  - Consider cultural and social  influences on pain and pain management  - Manage/alleviate anxiety  - Utilize distraction and/or relaxation techniques  - Monitor for opioid side effects  - Notify MD/LIP if interventions unsuccessful or patient reports new pain  - Anticipate increased pain with activity and pre-medicate as appropriate  Outcome: Progressing     Problem: SAFETY ADULT - FALL  Goal: Free from fall injury  Description: INTERVENTIONS:  - Assess pt frequently for physical needs  - Identify cognitive and physical deficits and behaviors that affect risk of falls.  - [place] fall precautions as indicated by assessment.  - Educate pt/family on patient safety including physical limitations  - Instruct pt to call for assistance with activity based on assessment  - Modify environment to reduce risk of injury  - Provide assistive devices as appropriate  - Consider OT/PT consult to assist with strengthening/mobility  - Encourage toileting schedule  Outcome: Progressing     Problem: Patient Centered Care  Goal: Patient preferences are identified and integrated in the patient's plan of care  Description: Interventions:  - What would you like us to know as we care for you?   - Provide timely, complete, and accurate information to patient/family  - Incorporate patient and family knowledge, values, beliefs, and cultural backgrounds into the planning and delivery of care  - Encourage patient/family to participate in care and decision-making at the level they choose  - Honor patient and family perspectives and choices  Outcome: Progressing     Problem: Patient/Family Goals  Goal: Patient/Family Long Term Goal  Description: Patient's Long Term Goal:     Interventions:  -   - See additional Care Plan goals for specific interventions  Outcome: Progressing  Goal: Patient/Family Short Term Goal  Description: Patient's Short Term Goal:     Interventions:   -   - See additional Care Plan goals for specific interventions  Outcome: Progressing

## 2024-05-19 NOTE — HOSPICE RN NOTE
[facility] RN notified by Mayi HENSLEY of patient's natural death. Reported TOD 1035. Hospice RN visited with family at bedside to offer condolences and bereavement resources/services. Family stated understanding. [facility] team is available for family support and further resource education.    Yola Scales RN, BSN  Transitional Nurse Liaison  [facility]  [phone] (After-hours)

## 2024-05-19 NOTE — DISCHARGE SUMMARY
[facility]  part of [facility]     DISCHARGE SUMMARY     Lina Dudley Patient Status:  Inpatient    1926 MRN [id number]   Location [facility] 4W/SW/SE Attending Surendra Mercado MD   Hosp Day # 3 PCP ROBYN KULKARNI MD     DATE OF ADMISSION: 2024  DATE OF DISCHARGE:  24  DISPOSITION:     DISCHARGE DIAGNOSES:    Chronic respiratory failure, unspecified whether with hypoxia or hypercapnia (HCC)    Goals of care, counseling/discussion    Palliative care by specialist    Acute on chronic congestive heart failure, unspecified heart failure type (HCC)    Acute kidney injury (HCC)    COPD exacerbation (HCC)    Hyponatremia    HISTORY OF PRESENT ILLNESS (COPIED FROM ADMISSION H&P)  Lina Dudley is a 89 year old female with history of COPD, hyperlipidemia, A-fib, HTN, CHF, pHTN, chronic respiratory failure who was sent from nursing home today for evaluation of worsening shortness of breath.  Has no chest pain but notes pain in her left shoulder and hip.  This is the third hospitalization this month.  Discharged 2 days ago.  Treated for HFpEF with inpatient diuresis.  No diuretics at discharge due to low blood pressure, QUITA.  No fever, chills, nausea, vomiting or abdominal pain.     Patient was admitted, treated medically for her heart failure, kidney injury, hypoxemic respiratory failure.  She continued to decline.  Further aggressive measures deferred by patient and family.  Arrangements made for home hospice, however she continued to decline and will be made inpatient hospice at this point according to hospice recommendations.    HOSPITAL COURSE:  Patient was admitted and placed on comfort care measures.  Pain and agitation controlled with medications.  Patient remained comfortable throughout her hospice hospitalization.  Support given to family at the bedside.  Patient  on the morning of .    ALEXIA

## 2024-05-20 ENCOUNTER — TELEPHONE (OUTPATIENT)
Dept: INTERNAL MEDICINE UNIT | Facility: HOSPITAL | Age: 89
End: 2024-05-20

## 2024-05-20 NOTE — TELEPHONE ENCOUNTER
Patient  under Inpatient Hospice level of care. [facility] notified death certificate to be completed by [facility] Medical Director (Dr Dewayne Rodaret, Phone [phone], fax [phone]).

## 2025-01-30 NOTE — ADDENDUM NOTE
Encounter addended by:  Noa Black LPN on: 2/66/8402  3:46 PM<BR>    Actions taken: Letter status changed You can access the FollowMyHealth Patient Portal offered by [facility] by registering at the following website: [email or web address] By joining [email or web address]’s FollowMyHealth portal, you will also be able to view your health information using other applications (apps) compatible with our system.

## 2025-06-27 NOTE — CM/SW NOTE
Patient's daughter came to the [facility] location requesting refill on RX Insulin(Levemir) 100 Units/ML. Patient is also requesting test Strips.           Please advise              Spoke to the pt at the bedside. She stated that she would rather go home. She has been waiting for the ambulance to take her home. Spoke to Spencer her niece and she said that she will support her to go home. She can come to the home in the morning and afternoon to check on her. Dr Sheree Prakash updated. Reggie is on her way to be with the pt.

## (undated) NOTE — LETTER
11/12/18    Manjit Saldaña M.D.  [facility], [address]   [phone]      Dear Dr. David Walker,    Your Patient, Liam Gunderson, date of birth 12/23/1926, has been treated at [address] [place] [address] for her wound(s).  She ha

## (undated) NOTE — ED AVS SNAPSHOT
Kiara Rodriguez   MRN: [id number]    Department:  [facility] Emergency Department   Date of Visit:  10/4/2018           Disclosure     Insurance plans vary and the physician(s) referred by the ER may not be covered by your plan.  Please conta within the next three months to obtain basic health screening including reassessment of your blood pressure.     IF THERE IS ANY CHANGE OR WORSENING OF YOUR CONDITION, CALL YOUR PRIMARY CARE PHYSICIAN AT ONCE OR RETURN IMMEDIATELY TO THE EMERGENCY DEPARTMEN

## (undated) NOTE — IP AVS SNAPSHOT
Patient Demographics     Address  [address]  [facility] [address] Phone  [phone] (Home)  [phone] (Mobile) *Preferred*      Patient Contacts     Name Relation Home Work Christina Mora [phone]      Allergies as of 5/9/2024  Review status set to Review Complete on 5/6/2024       Noted Reaction Type Reactions    Bacitracin 03/22/2023   Systemic UNKNOWN    Neosporin Original [neomycin-bacitracin-polymyxin] 06/30/2016    ITCHING    Polymyxin B 03/22/2023   Systemic UNKNOWN      Code Status Information     Code Status    DNAR/Selective Treatment      Patient Instructions    None      Follow-up Information     Asha Mendes MD. Schedule an appointment as soon as possible for a visit in 2 week(s).    Specialty: Internal Medicine  Contact information:  [address] [facility] [address]  [phone]             Van Nix MD. Schedule an appointment as soon as possible for a visit in 1 week(s).    Specialty: Cardiovascular Diseases  Contact information:  [address]  [facility] [address]  [phone]                        Your Home Meds List      TAKE these medications       Instructions Authorizing Provider Morning Afternoon Evening As Needed   acetaminophen 500 MG Tabs  Commonly known as: Tylenol Extra Strength      Take 1 tablet (500 mg total) by mouth every 4 (four) hours as needed for Fever (equal to or greater than 100.4).   Kenya Pike         albuterol 108 (90 Base) MCG/ACT Aers  Commonly known as: Ventolin HFA      inhale 2 puff by inhalation route  every 4 - 6 hours as needed   Placido Hutchins         atorvastatin 10 MG Tabs  Commonly known as: Lipitor      Take 1 tablet (10 mg total) by mouth nightly.   Placido Hutchins         docusate sodium 100 MG Caps  Commonly known as: COLACE      Take 100 mg by mouth 2 (two) times daily.   BAUTISTA LUND         fluticasone furoate-vilanterol 100-25 MCG/ACT Aepb  Commonly known as: Breo  Ellipta      Inhale 1 puff into the lungs every morning. To follow with gargle, rinse, & spit after using BREO to help reduce your chance of getting thrush.   Placido Hutchins         ipratropium-albuterol 0.5-2.5 (3) MG/3ML Soln  Commonly known as: Duoneb      Take 3 mL by nebulization every 6 (six) hours as needed.   BAUM BOB LUND         metoprolol tartrate 25 MG Tabs  Commonly known as: Lopressor      Take 0.5 tablets (12.5 mg total) by mouth 2x Daily(Beta Blocker).   Jemima Matthew-Ansal         Polyethylene Glycol 3350 17 g Pack  Commonly known as: MIRALAX      Take 17 g by mouth daily as needed.   BAUM [facility]               Where to Get Your Medications      These medications were sent to [id number] - [place], [place] - [address] [facility] [address] [facility] [phone], [phone]  [address] [facility] [address]    Phone: [phone]   metoprolol tartrate 25 MG Tabs           [id number] - MAR ACTION REPORT  (last 48 hrs)    ** SITE UNKNOWN **     Order ID Medication Name Action Time Action Reason Comments    [id number] acetaminophen (Tylenol Extra Strength) tab 500 mg 05/07/24 1707 Given      [id number] acetaminophen (Tylenol Extra Strength) tab 500 mg 05/07/24 2302 Given      [id number] acetaminophen (Tylenol Extra Strength) tab 500 mg 05/08/24 0245 Given      [id number] acetaminophen (Tylenol Extra Strength) tab 500 mg 05/08/24 1234 Given      [id number] acetaminophen (Tylenol Extra Strength) tab 500 mg 05/08/24 1650 Given      [id number] furosemide (Lasix) tab 40 mg 05/07/24 1426 Given      [id number] metoprolol tartrate (Lopressor) partial tab 12.5 mg 05/09/24 0536 Given            LEFT LOWER ABDOMEN     Order ID Medication Name Action Time Action Reason Comments    [id number] heparin (Porcine) 5000 UNIT/ML injection 5,000 Units 05/07/24 2025 Given by Other      [id number] heparin (Porcine) 5000 UNIT/ML injection 5,000 Units 05/08/24 0829 Given      [id number] heparin (Porcine) 5000 UNIT/ML injection 5,000 Units 05/08/24 2017  Given            RIGHT LOWER ABDOMEN     Order ID Medication Name Action Time Action Reason Comments    [id number] heparin (Porcine) 5000 UNIT/ML injection 5,000 Units 05/09/24 0946 Given              Recent Vital Signs    Flowsheet Row Most Recent Value   BP 93/73 Filed at 05/09/2024 0937   Pulse 87 Filed at 05/09/2024 0937   Resp 16 Filed at 05/09/2024 0935   Temp 97.2 °F (36.2 °C) Filed at 05/09/2024 0935   SpO2 96 % Filed at 05/09/2024 0935      Patient's Most Recent Weight    Flowsheet Row Most Recent Value   Patient Weight 51.3 kg (113 lb 3.2 oz)         Lab Results Last 24 Hours      Magnesium [[id number]] (Abnormal)  Resulted: 05/09/24 0733, Result status: Final result   Ordering provider: Jemima Chaudhry MD  05/08/24 2300 Resulting lab[facility]    Specimen Information    Type Source Collected On   Blood — 05/09/24 0606          Components    Component Value Reference Range Flag Lab   Magnesium 2.9 1.6 - 2.6 mg/dL H [facility])            Basic Metabolic Panel (8) [[id number]] (Abnormal)  Resulted: 05/09/24 0733, Result status: Final result   Ordering provider: Maliha Eason APRN  05/08/24 2300 Resulting lab: [facility]    Specimen Information    Type Source Collected On   Blood — 05/09/24 0606          Components    Component Value Reference Range Flag Lab   Glucose 113 70 - 99 mg/dL H [facility])   Sodium 132 136 - 145 mmol/L L [facility])   Potassium 4.4 3.5 - 5.1 mmol/L — [facility])   Chloride 99 98 - 112 mmol/L — [facility])   CO2 26.0 21.0 - 32.0 mmol/L — [facility])   Anion Gap 7 0 - 18 mmol/L — [facility])   BUN 65 9 - 23 mg/dL H [facility] ([facility])   Creatinine 1.29 0.55 - 1.02 mg/dL H [facility] ([facility])   BUN/CREA Ratio 50.4 10.0 - 20.0 H [facility] ([facility])   Calcium, Total 8.7 8.7 - 10.4 mg/dL — [facility] ([facility])   Calculated Osmolality 293 275 - 295 mOsm/kg —  [facility] ([facility])   eGFR-Cr 38 >=60 mL/min/1.73m2 L [facility] ([facility])            CBC With Differential With Platelet [[id number]] (Abnormal)  Resulted: 05/09/24 0702, Result status: Final result   Ordering provider: Jemima Chaudhry MD  05/08/24 2300 Resulting lab: [facility] ([facility])   Narrative:  The following orders were created for panel order CBC With Differential With Platelet.  Procedure                               Abnormality         Status                     ---------                               -----------         ------                     CBC W/ DIFFERENTIAL[[id number]]          Abnormal            Final result                 Please view results for these tests on the individual orders.    Specimen Information    Type Source Collected On   Blood — 05/09/24 0606            Procalcitonin [[id number]] (Abnormal)  Resulted: 05/08/24 1633, Result status: Final result   Ordering provider: Jemima Chaudhry MD  05/08/24 1340 Resulting lab: [facility] ([facility])    Specimen Information    Type Source Collected On   Blood — 05/08/24 1536          Components    Component Value Reference Range Flag Lab   Procalcitonin 0.66 <0.05 ng/mL H [facility] ([facility])   Comment:        Result of <0.05 ng/mL: Systemic bacterial infection unlikely.    Result of 0.05-0.50 ng/mL: Does not exclude localized bacterial infection.    Result of >2.00 ng/mL: High likelihood of systemic bacterial infection or severe localized organ infection, but can also be elevated after major surgery, trauma, acute multi-organ failure.    Note: Viral infection in the absence of secondary bacterial infection, auto-immune inflammation, mild localized bacterial infections rarely account for elevation of PCT >0.50 ng/mL. PCT levels rise after a few hours, peak at the 12-24 hours, then fall at a 24h half-life, while on appropriate antibiotics or after the inflammatory insult is eliminated.  If the PCT measurement is done very early after the systemic infection process has started (usually <6 hours), these values may still be low. A sustained elevated or rising PCT in the setting of a non-infectious elevation may indicate a secondary bacterial infection. Various non-infectious conditions are known   to induce changes in PCT levels, therefore PCT levels between 0.5 ng/mL and 2.0 ng/mL should be interpreted in the context of the specific clinical background and condition(s) of the individual patient. Retesting PCT within 6 - 24 hours should be considered if any concentrations <=2.0 ng/mL are obtained.            CBC With Differential With Platelet [[id number]] (Abnormal)  Resulted: 05/08/24 1558, Result status: Final result   Ordering provider: Jemima Chaudhry MD  05/08/24 1340 Resulting lab: [facility] ([facility])   Narrative:  The following orders were created for panel order CBC With Differential With Platelet.  Procedure                               Abnormality         Status                     ---------                               -----------         ------                     CBC W/ DIFFERENTIAL[[id number]]          Abnormal            Final result                 Please view results for these tests on the individual orders.    Specimen Information    Type Source Collected On   Blood — 05/08/24 1536            Testing Performed By     Lab - Abbreviation Name Director Address Valid Date Range    162 - [facility] ([facility]) [facility] ([facility]) Hal Galvan M.D. [address] [facility] [address] 03/19/20 1442 - Present            Microbiology Results (All)     Procedure Component Value Units Date/Time    SARS-CoV-2/Flu A and B/RSV by PCR (GeneXpert) [[id number]]  (Normal) Collected: 05/06/24 1949    Order Status: Completed Lab Status: Final result Updated: 05/06/24 2031    Specimen: Other from Nares      SARS-CoV-2 (COVID-19) -  (GeneXpert) Not Detected     Influenza A by PCR Negative     Influenza B by PCR Negative     RSV by PCR Negative    Narrative:      This test is intended for the qualitative detection and differentiation of SARS-CoV-2, influenza A, influenza B, and respiratory syncytial virus (RSV) viral RNA in nasopharyngeal or nares swabs from individuals suspected of respiratory viral infection consistent with COVID-19 by their healthcare provider. Signs and symptoms of respiratory viral infection due to SARS-CoV-2, influenza, and RSV can be similar.    Test performed using the Xpert Xpress SARS-CoV-2/FLU/RSV (real time RT-PCR)  assay on the GeneXpert instrument, [employer], [place] [address].   This test is being used under the Food and Drug Administration's Emergency Use Authorization.    The authorized Fact Sheet for Healthcare Providers for this assay is available upon request from the laboratory.         H&P - H&P Note      H&P signed by Jaime Brunson MD at 2024  9:30 PM  Version 1 of 1    Author: Jaime Brunson MD Service: — Author Type: Physician    Filed: 2024  9:30 PM Date of Service: 2024  8:49 PM Status: Signed    : Jaime Brunson MD (Physician)       [facility]  part of [facility]    History & Physical    Lina Dudley Patient Status:  Emergency    1926 MRN [id number]   [facility] EMERGENCY DEPARTMENT Attending Jazmyne Mercado MD   Hosp Day # 0 PCP ROBYN KULKARNI MD     Date:  2024  Date of Admission:  2024    History provided by:patient  Chief Complaint:     Chief Complaint   Patient presents with    Shortness Of Breath       HPI:   Lina Dudley is a 89 year old female with hx of COPD, pulmonary hypertension, and HFpEF, who p/w dyspnea for 1 day. Patient was just hospitalized -24 for UTI, QUITA and HFpEF. She was seen by Cards and Pulm. She was discharged to [facility]. No diuretic at discharge. Patient developed dyspnea  this morning 24. Oxygen was increased from 2L to 4L with SpO2 of 94%. Patient was seen by NP at the time. No other history was available because patient was very lethargic in the ED. When she was sent to ED by EMS, patient has SpO2 99% on 4L. Blood work has na 130, K 6.1, Cr 1.7, WBC 16. CXR has more infiltrates and pleural effusion than CXR 24. Lokelma, dextrose/insulin and calcium given in ED.    History     Past Medical History:    Appendicitis    APPENDECTOMY    Arthritis    Basal cell carcinoma    Of Face     Chicken pox    COPD (chronic obstructive pulmonary disease) (HCC)    Left arm numbness    Measles    Other and unspecified hyperlipidemia    Pneumonia    Tonsillitis    Unspecified essential hypertension     Past Surgical History:   Procedure Laterality Date    Appendectomy      Cataract extraction Bilateral     Dr. Tirado's office    Glaucoma surgery      Hip replacement surgery      Skin surgery  Excision of Basal cell carcinoma of face     Tonsillectomy      T&A     Family History   Problem Relation Age of Onset    Cancer Father     Hypertension Father     Heart Disease Father     Heart Disease Mother     Gastro-Intestinal Disorder Maternal Grandmother         TUBERCULOSIS    Breast Cancer Maternal Aunt         80s    Neurological Disorder Other         GREAT AUNT, EPILEPSY    Lipids Other         HYPERLIPIDEMIA    Glaucoma Neg     Diabetes Neg      Social History:  Social History     Socioeconomic History    Marital status: Single   Tobacco Use    Smoking status: Former     Current packs/day: 0.00     Average packs/day: 1 pack/day for 30.0 years (30.0 ttl pk-yrs)     Types: Cigarettes     Start date: 1960     Quit date: 1990     Years since quittin.3    Smokeless tobacco: Former   Vaping Use    Vaping status: Never Used   Substance and Sexual Activity    Alcohol use: No     Alcohol/week: 2.0 standard drinks of alcohol     Types: 2 Glasses of wine per week     Comment: rare    Drug  use: No   Other Topics Concern    Caffeine Concern Yes     Comment: COFFEE 3 CUPS/DAY     Social Determinants of Health     Food Insecurity: No Food Insecurity (5/1/2024)    Food Insecurity     Food Insecurity: Never true   Transportation Needs: No Transportation Needs (5/1/2024)    Transportation Needs     Lack of Transportation: No   Housing Stability: Low Risk  (5/1/2024)    Housing Stability     Housing Instability: No     Allergies/Medications:   Allergies:   Allergies   Allergen Reactions    Bacitracin UNKNOWN    Neosporin Original [Neomycin-Bacitracin-Polymyxin] ITCHING    Polymyxin B UNKNOWN     (Not in a hospital admission)      Review of Systems:   Negative except depicted in HPI.    A comprehensive 12 point review of systems was completed.  Pertinent positives and negatives noted in the the HPI.    Physical Exam:   Vital Signs:  Blood pressure 96/68, pulse 90, temperature 97.6 °F (36.4 °C), temperature source Temporal, resp. rate 18, SpO2 100%, not currently breastfeeding.     GENERAL:  The patient appeared to be in no distress, but lethargic  SKIN:  Warm and hydrated  HEENT:  Head was atraumatic and normocephalic.  Eyes:  Extraocular muscles were intact.  Sclera was anicteric.  Pupils were equally reactive to light.  Ears:  There were no lesions.  Nose:  No lesions were noted.   NECK:  Supple.  There was no JVD.   CHEST:  Symmetrical movement on inspiration  HEART:  S1 and S2 heard.  RRR   LUNGS:  Air entry was good.  No crackles or wheezes   ABDOMEN: Soft and non-tender.  Bowel sounds were present.  MUSCULOSKELETAL:  There was no deformity.  There was full range of motion in all the extremities.   EXTREMITIES: There was no edema, clubbing or cyanosis  NEUROLOGICAL:  There was no focal deficit.  Cranial nerves II through XII were intact.  PSYCHIATRIC: Calm and cooperative     Results:     Lab Results   Component Value Date    WBC 15.7 (H) 05/06/2024    HGB 15.1 05/06/2024    HCT 45.0 05/06/2024    PLT  233.0 05/06/2024    CREATSERUM 1.73 (H) 05/06/2024    BUN 85 (H) 05/06/2024     (L) 05/06/2024    K 6.1 (HH) 05/06/2024    CL 98 05/06/2024    CO2 19.0 (L) 05/06/2024    GLU 89 05/06/2024    CA 8.9 05/06/2024    ALB 3.5 05/06/2024    ALKPHO 323 (H) 05/06/2024    BILT 1.5 (H) 05/06/2024    TP 5.8 05/06/2024     (H) 05/06/2024    ALT 53 (H) 05/06/2024    TSH 3.47 08/28/2018    MG 2.6 05/04/2024     (H) 05/01/2024       No results found.  EKG 12 Lead    Result Date: 5/6/2024  Accelerated Junctional rhythm Abnormal ECG When compared with ECG of 01-MAY-2024 10:45, Junctional rhythm has replaced Sinus rhythm Left anterior fascicular block is no longer Present     Assessment/Plan:   Lina Dudley is a 89 year old female with hx of COPD, pulmonary hypertension, and HFpEF, who p/w dyspnea for 1 day.     # HFpEF exacerbation  - Echo from 5/2 with LVEF 60-65%, no RWMA, moderate MR, Severe TR, PASP 69mmHg   - now patient has higher BNP, and more infiltrates and pleural effusion on CXR  - Cards consult in AM    # Acute kidney injury  - Cr 1.1 on 5/3/24, now 1.7 on arrival, BMP in AM    # Hyperkalemia  - K 6.1 on arrival  - Lokelma, dextrose/insulin and calcium given in ED, repeat in AM  - check AXR to rule out constipation    # Hyponatremia  - Na 130 on arrival, BMP in AM    # Chronic hypoxemic respiratory failure  # Possible mild early interstitial lung disease   # Pulmonary hypertension  # COPD  - was on intermittent 1L O2 at home, now SpO2 99% on 4L    # Elevated troponin  - troponin 171 on 5/1/24, now down to 58 on arrival    # UTI  - urine culture 5/1/24 grew Klebsiella, sensitive to cefazolin, has been receiving abx since, on cefadroxil at SNF.    # Debility  - patient lived alone with independent ADLs prior to the hospitalization on 5/1/24  - PT/OT    Diet: low salt  PT/OT: consulted  DVT ppx: subcutaneous heparin  Line: none  Code status: DNR  Dispo: greater than 2 midnights    KIRA: high    Sandoval  MD Boy, PhD  Message via [employer]  [facility] Hospitalist      Electronically signed by Jaime Brunson MD on 2024  9:30 PM              Consults - MD Consult Notes      Consults signed by Charles Wagoner DO at 2024  1:55 PM     Author: Charles Wagoner DO Service: Cardiology Author Type: Physician    Filed: 2024  1:55 PM Date of Service: 2024 12:24 PM Status: Signed    : Charles Wagoner DO (Physician)     Consult Orders    1. Consult to Cardiology [[id number]] ordered by Nat Reardon MD at 24 0049               Cardiology Consult Note    Lina Dudley Patient Status:  Inpatient    1926 MRN [id number]   Location [facility] 3W/SW Attending Demi Arthur MD   Hosp Day # 1 PCP ROBYN KULKARNI MD     HPI.  Lina Dudley is a 89 year old female with a history of chronic hypoxic respiratory failure/ILD, heart failure, CKD, COPD, pulmonary arterial hypertension who presents to the emergency room from her nursing home with complaints of worsening dyspnea.  Patient currently on 2 L nasal cannula and increased her requirement to 4 L and still has saturations of 84%.  Lab work pertinent for potassium 6.1, creatinine 1.73, sodium one 1.3, BNP 3100, WBC 15.7 with 13% neutrophils.  Troponin initially 58, recheck 52-has history of chronic troponin elevation and earlier this month she was in the 160s to 170s.  Chest x-ray showed bilateral small pleural effusions and possible pulm edema as well as chronic back chronic scarring, fibrosis.  In the ED patient was treated for hypokalemia with Lasix, calcium, dextrose and insulin.    Patient was recent admitted from - after presenting with a fall and had management of acute on chronic respiratory failure, heart failure.  She was discharged to SNF.    Echocardiogram 2024  1. Left ventricle: The cavity size was normal. Wall thickness was normal.      Systolic function was normal. The estimated ejection fraction was  60-65%,      by visual assessment. No diagnostic evidence for regional wall motion      abnormalities. Left ventricular diastolic function parameters were normal      for the patient's age.   2. Right ventricle: The cavity size was increased.   3. Left atrium: The left atrial volume was normal.   4. Right atrium: The atrium was dilated.   5. Aortic valve: The valve was trileaflet. The leaflets were mildly      thickened and mildly calcified.   6. Mitral valve: The leaflets were mildly calcified. Prolapse. There was      moderate regurgitation.   7. Tricuspid valve: There was moderate-severe regurgitation.   8. Pulmonary arteries: Systolic pressure was moderately to markedly      increased, estimated to be 69mm Hg.       --------------------------------------------------------------------------------------------------------------------------------  ROS 10 systems reviewed, pertinent findings above.  ROS    History:  Past Medical History:    Appendicitis    APPENDECTOMY    Arthritis    Basal cell carcinoma    Of Face     Chicken pox    COPD (chronic obstructive pulmonary disease) (HCC)    Left arm numbness    Measles    Other and unspecified hyperlipidemia    Pneumonia    Tonsillitis    Unspecified essential hypertension     Past Surgical History:   Procedure Laterality Date    Appendectomy      Cataract extraction Bilateral     Dr. Tirado's office    Glaucoma surgery      Hip replacement surgery      Skin surgery  Excision of Basal cell carcinoma of face     Tonsillectomy      T&A     Family History   Problem Relation Age of Onset    Cancer Father     Hypertension Father     Heart Disease Father     Heart Disease Mother     Gastro-Intestinal Disorder Maternal Grandmother         TUBERCULOSIS    Breast Cancer Maternal Aunt         80s    Neurological Disorder Other         GREAT AUNT, EPILEPSY    Lipids Other         HYPERLIPIDEMIA    Glaucoma Neg     Diabetes Neg       reports that she quit smoking about 34 years  ago. Her smoking use included cigarettes. She started smoking about 64 years ago. She has a 30 pack-year smoking history. She has quit using smokeless tobacco. She reports that she does not drink alcohol and does not use drugs.    Objective:   Temp: 97.4 °F (36.3 °C)  Pulse: 82  Resp: 18  BP: 91/64    Intake/Output:     Intake/Output Summary (Last 24 hours) at 5/7/2024 1224  Last data filed at 5/7/2024 0900  Gross per 24 hour   Intake 300 ml   Output --   Net 300 ml       Physical Exam:     General: awake, alert  HEENT: Normocephalic, anicteric sclera, neck supple.  Neck: No JVD, carotids 2+, no bruits.  Cardiac: Regular rate and rhythm. S1, S2 normal. No murmur, pericardial rub, S3.  Lungs: Clear without wheezes, rales, rhonchi or dullness.  Normal excursions and effort.  Abdomen: Soft, non-tender. BS-present.  Extremities: Without clubbing, cyanosis or edema.  Peripheral pulses are 2+.  Neurologic: Non-focal  Skin: Warm and dry.       Assessment:    Shortness of breath  Acute on chronic hypoxic respiratory failure  Atrial fibrillation, new onset  Interstitial lung disease  COPD  Pulm hypertension  Volume overload      Plan:  - 89 yo presenting with SOB, hypoxia and admitted with respiratory failure.   - Pt with various rhythm abnormalities in the past including accelerated junctional - has new afib since this admission, likely provoked from respiratory failure.   - HR better today, BP soft - can trial lopressor 12.5mg BID   - consider low dose eliquis 2.5mg BID if persistent afib  - no overt volume overload on exam however CXR concerning for ILD and some pulmonary edema trial of oral lasix today    Thank you for allowing me to take part in the care of Lina Dudley. Please call with any questions of concerns.      Level of care: C5    Dr. Charles Wagoner DO  May 07, 2024  12:24 PM        Electronically signed by Charles Wagoner DO on 5/7/2024  1:55 PM              Discharge Summary - D/C  Summary      Discharge Summary signed by Jemima Chaudhry MD at 2024 12:36 PM  Version 1 of 1    Author: Jemima Chaudhry MD Service: Hospitalist Author Type: Physician    Filed: 2024 12:36 PM Date of Service: 2024 12:26 PM Status: Signed    : Jemima Chaudhry MD (Physician)         [facility]  part of [facility]    Discharge Summary    Lina Dudley Patient Status:  Inpatient    1926 MRN [id number]   Location [facility] 3W/SW Attending Jemima Chaudhry MD   Hosp Day # 3 PCP ROBYN KULKARNI MD     Date of Admission: 2024      Date of Discharge: 24      Admitting Diagnosis: Hyperkalemia [E87.5]  QUITA (acute kidney injury) (HCC) [N17.9]  Acute on chronic congestive heart failure, unspecified heart failure type (HCC) [I50.9]    Hospital Discharge Diagnoses:  CHF, QUITA    Lace+ Score: 71  59-90 High Risk  29-58 Medium Risk  0-28   Low Risk.    TCM Follow-Up Recommendation:  LACE > 58: High Risk of readmission after discharge from the hospital.          Problem List:   Patient Active Problem List   Diagnosis    Sciatica of right side    Mixed hyperlipidemia    Medicare annual wellness visit, subsequent    Chronic obstructive pulmonary disease (HCC)    Macular degeneration    Presbyopia    Venous insufficiency    Osteoarthritis    Urinary frequency    Constipation    Elevated troponin I level    Fall, initial encounter    Closed fracture of ninth thoracic vertebra, unspecified fracture morphology, initial encounter (HCC)    Protein-calorie malnutrition, unspecified severity (HCC)    Acute on chronic congestive heart failure (HCC)    Acute on chronic respiratory failure with hypoxia (HCC)    Goals of care, counseling/discussion    Advance care planning    Palliative care by specialist    Hyperkalemia    Acute on chronic congestive heart failure, unspecified heart failure type (HCC)    QUITA (acute kidney injury) (HCC)         Physical  Exam:     Gen: No acute distress  Pulm: Lungs clear, normal respiratory effort  CV: Heart with regular rate and rhythm  Abd: Abdomen soft, nontender, nondistended, bowel sounds present  Neuro: No acute focal deficits  MSK: Full range of motion in extremities  Skin: Warm and dry  Psych: Normal affect  Ext: no c/c/e      History of Present Illness: Per Dr Brunson    Lina Dudley is a 89 year old female with hx of COPD, pulmonary hypertension, and HFpEF, who p/w dyspnea for 1 day. Patient was just hospitalized 5/1-5/4/24 for UTI, QUITA and HFpEF. She was seen by Cards and Pulm. She was discharged to [facility]. No diuretic at discharge. Patient developed dyspnea this morning 5/6/24. Oxygen was increased from 2L to 4L with SpO2 of 94%. Patient was seen by NP at the time. No other history was available because patient was very lethargic in the ED. When she was sent to ED by EMS, patient has SpO2 99% on 4L. Blood work has na 130, K 6.1, Cr 1.7, WBC 16. CXR has more infiltrates and pleural effusion than CXR 5/1/24. Lokelma, dextrose/insulin and calcium given in ED.     Hospital Course:     # HFpEF exacerbation  - Echo from 5/2 with LVEF 60-65%, no RWMA, moderate MR, Severe TR, PASP 69mmHg   - now patient has higher BNP, and more infiltrates and pleural effusion on CXR  - Cards consult appreciated   - given lower pressures holding further diuresis  - follow up as outpt     # Acute kidney injury  - Cr 1.1 on 5/3/24, now 1.7 on arrival, BMP in AM shows Cr of 1.29     # Hyperkalemia  - K 6.1 on arrival  - Lokelma, dextrose/insulin and calcium given in ED, repeat in AM  -repeat K improved  -plan low potassium diet     # Hyponatremia  - Na 130 on arrival, mild, will monitor     # Chronic hypoxemic respiratory failure  # Possible mild early interstitial lung disease   # Pulmonary hypertension  # COPD  # Pneumonia  - was on intermittent 1L O2 at home, now SpO2 96% on 2L  -? Empiric abx. for underlying  pneumonia  -check procal - slightly elevated but she is not symptomatic and cxr shows improvement so no abx for dc     # Elevated troponin  - troponin 171 on 5/1/24, now down to 58 on arrival     # UTI  - urine culture 5/1/24 grew Klebsiella, sensitive to cefazolin, has been receiving abx since, on cefadroxil at [facility].  - treated     # Debility  - patient lived alone with independent ADLs prior to the hospitalization on 5/1/24  - PT/OT - return to [facility]    Discharge Condition: Stable    Discharge Medications:      Discharge Medications        START taking these medications        Instructions Prescription details   metoprolol tartrate 25 MG Tabs  Commonly known as: Lopressor      Take 0.5 tablets (12.5 mg total) by mouth 2x Daily(Beta Blocker).   Quantity: 60 tablet  Refills: 0            CONTINUE taking these medications        Instructions Prescription details   acetaminophen 500 MG Tabs  Commonly known as: Tylenol Extra Strength      Take 1 tablet (500 mg total) by mouth every 4 (four) hours as needed for Fever (equal to or greater than 100.4).   Refills: 0     albuterol 108 (90 Base) MCG/ACT Aers  Commonly known as: Ventolin HFA      inhale 2 puff by inhalation route  every 4 - 6 hours as needed   Quantity: 1 each  Refills: 5     atorvastatin 10 MG Tabs  Commonly known as: Lipitor      Take 1 tablet (10 mg total) by mouth nightly.   Quantity: 30 tablet  Refills: 5     docusate sodium 100 MG Caps  Commonly known as: COLACE      Take 100 mg by mouth 2 (two) times daily.   Refills: 0     fluticasone furoate-vilanterol 100-25 MCG/ACT Aepb  Commonly known as: Breo Ellipta      Inhale 1 puff into the lungs every morning. To follow with gargle, rinse, & spit after using BREO to help reduce your chance of getting thrush.   Quantity: 1 each  Refills: 5     ipratropium-albuterol 0.5-2.5 (3) MG/3ML Soln  Commonly known as: Duoneb      Take 3 mL by nebulization every 6 (six) hours as needed.   Refills: 0     Polyethylene Glycol  3350 17 g Pack  Commonly known as: MIRALAX      Take 17 g by mouth daily as needed.   Refills: 0            STOP taking these medications      cefadroxil 500 MG Caps  Commonly known as: DURICEF                  Where to Get Your Medications        These medications were sent to [id number] - [facility] [place] [place] - [address] [phone], [phone]  [address] , [facility] [address]      Phone: [phone]   metoprolol tartrate 25 MG Tabs             Jemima Chaudhry MD  2024  12:26 PM    Greater than 30 minutes spent on preparation and coordination of this discharge    Electronically signed by Jemima Chaudhry MD on 2024 12:36 PM           Imaging Results (HF patients)    Chest X-Ray Results (HF patients only)    No exam resulted this encounter.      2D Echocardiogram Results (HF patients only)    CARD ECHO 2D DOPPLER (CPT=93306)    Result Date: 2024  Transthoracic Echocardiogram Name:Lina Dudley Date: 2024 :  1926 Ht:  (65in)  BP: 104 / 71 MRN:  [id number]    Age:  89years    Wt:  (112lb) HR: 92bpm Loc:  [facility]       Gndr: F          BSA: 1.55m^2 Sonographer: Clarissa Greenwood Ordering:    [employer] Consulting:  Todd Pedroza ---------------------------------------------------------------------------- History/Indications:   Congestive heart failure. Blood tests:    Troponin elevated. ---------------------------------------------------------------------------- Procedure information:  A transthoracic complete 2D study was performed. Additional evaluation included M-mode, complete spectral Doppler, and color Doppler.  Patient status:  Outpatient.  Location:  Bedside.    Comparison was made to the study of 08/15/2022.    This was a routine study. Transthoracic echocardiography for diagnosis and ventricular function evaluation. Image quality was adequate. ECG rhythm:   Normal sinus ----------------------------------------------------------------------------  Conclusions: 1. Left ventricle: The cavity size was normal. Wall thickness was normal.    Systolic function was normal. The estimated ejection fraction was 60-65%,    by visual assessment. No diagnostic evidence for regional wall motion    abnormalities. Left ventricular diastolic function parameters were normal    for the patient's age. 2. Right ventricle: The cavity size was increased. 3. Left atrium: The left atrial volume was normal. 4. Right atrium: The atrium was dilated. 5. Aortic valve: The valve was trileaflet. The leaflets were mildly    thickened and mildly calcified. 6. Mitral valve: The leaflets were mildly calcified. Prolapse. There was    moderate regurgitation. 7. Tricuspid valve: There was moderate-severe regurgitation. 8. Pulmonary arteries: Systolic pressure was moderately to markedly    increased, estimated to be 69mm Hg. Impressions:  This study is compared with previous dated 08/15/2022: * ---------------------------------------------------------------------------- * Findings: Left ventricle:  The cavity size was normal. Wall thickness was normal. Systolic function was normal. The estimated ejection fraction was 60-65%, by visual assessment. No diagnostic evidence for regional wall motion abnormalities. Left ventricular diastolic function parameters were normal for the patient's age. Left atrium:  The atrium was normal in size. The left atrial volume was normal. Right ventricle:  The cavity size was increased. Systolic function was low normal. Right atrium:  The atrium was dilated. Mitral valve:  The leaflets were mildly calcified. Leaflet separation was normal.  Prolapse.  Doppler:  Transvalvular velocity was within the normal range. There was no evidence for stenosis. There was moderate regurgitation. Aortic valve:   The valve was trileaflet. The leaflets were mildly thickened and mildly calcified.  Doppler:  Transvalvular velocity was within the normal range. There was no evidence for  stenosis. There was no significant regurgitation. Tricuspid valve:  The valve is structurally normal. Leaflet separation was normal.  Doppler:  Transvalvular velocity was within the normal range. There was no evidence for stenosis. There was moderate-severe regurgitation. Pulmonic valve:   The valve is structurally normal. Cusp separation was normal.  Doppler:  Transvalvular velocity was within the normal range. There was no evidence for stenosis. There was no significant regurgitation. Pericardium:   There was no pericardial effusion. Aorta: Aortic root: The aortic root was normal. Ascending aorta: The ascending aorta was normal. Pulmonary arteries: Systolic pressure was moderately to markedly increased, estimated to be 69mm Hg. Systemic veins:  Central venous respirophasic diameter changes are blunted (< 50%). Inferior vena cava: The IVC was normal-sized. ---------------------------------------------------------------------------- Measurements  Left ventricle                    Value        Ref  IVS thickness, ED, PLAX           0.7   cm     0.6 -                                                 0.9  LV ID, ED, PLAX               (L) 3.7   cm     3.8 -                                                 5.2  LV ID, ES, PLAX                   2.6   cm     2.2 -                                                 3.5  LV PW thickness, ED, PLAX         0.7   cm     0.6 -                                                 0.9  IVS/LV PW ratio, ED, PLAX         1.00         --------  LV PW/LV ID ratio, ED, PLAX       0.19         --------  LV ejection fraction              58    %      54 - 74  Stroke volume/bsa, 2D             16    ml/m^2 --------  LV e', lateral                    10.0  cm/sec >=10.0  LV E/e', lateral                  13           <=13  LV e', medial                     7.8   cm/sec >=7.0  LV E/e', medial                   16           --------  LV e', average                    8.9   cm/sec --------  LV E/e',  average                  14           <=14  LVOT                              Value        Ref  LVOT ID                           1.8   cm     --------  LVOT peak velocity, S             0.73  m/sec  --------  LVOT VTI, S                       9.7   cm     --------  LVOT peak gradient, S             2     mm Hg  --------  LVOT mean gradient, S             1     mm Hg  --------  Stroke volume (SV), LVOT DP       25    ml     --------  Stroke index (SV/bsa), LVOT       16    ml/m^2 --------  DP  Aortic root                       Value        Ref  Aortic root ID                    3.1   cm     2.3 -                                                 3.8  Ascending aorta                   Value        Ref  Ascending aorta ID                2.8   cm     1.9 -                                                 3.5  Left atrium                       Value        Ref  LA ID, A-P, ES                    2.8   cm     2.7 -                                                 3.8  LA volume, S                      45    ml     22 - 52  LA volume/bsa, S                  29    ml/m^2 16 - 34  LA volume, ES, 1-p A4C            43    ml     22 - 52  LA volume, ES, 1-p A2C            39    ml     22 - 52  LA volume, ES, A/L                47    ml     --------  LA volume/bsa, ES, A/L            31    ml/m^2 16 - 34  LA/aortic root ratio              0.9          --------  Mitral valve                      Value        Ref  Mitral E-wave peak velocity       1.28  m/sec  --------  Mitral A-wave peak velocity       0.47  m/sec  --------  Mitral peak gradient, D           7     mm Hg  --------  Mitral E/A ratio, peak            2.7          --------  Pulmonary artery                  Value        Ref  PA pressure, S, DP                69    mm Hg  --------  Tricuspid valve                   Value        Ref  Tricuspid regurg peak         (H) 3.9   m/sec  <=2.8  velocity  Tricuspid peak RV-RA gradient     61    mm Hg  --------  Systemic veins                     Value        Ref  Estimated CVP                     8     mm Hg  --------  Inferior vena cava                Value        Ref  ID                                1.6   cm     <=2.1  Right ventricle                   Value        Ref  TAPSE, MM                     (L) 1.56  cm     >=1.70  RV pressure, S, DP                69    mm Hg  --------  RV s', lateral                    10.0  cm/sec >=9.5 Legend: (L)  and  (H)  tylor values outside specified reference range. ---------------------------------------------------------------------------- Prepared and electronically signed by Tj Lange 05/02/2024 10:43         Cath Angiogram Results (HF Patients only)    No exam resulted this encounter.          Physical Therapy Notes (last 72 hours)      Physical Therapy Note signed by Dewayne Perdomo PT, DPT [facility] at 5/9/2024 11:10 AM  Version 1 of 1    Author: Dewayne Perdomo PT, DPT W[facility] Service: Rehab Author Type: Physical Therapist    Filed: 5/9/2024 11:10 AM Date of Service: 5/9/2024  9:00 AM Status: Signed    : Dewayne Perdomo PT, DPT W[facility] (Physical Therapist)       PHYSICAL THERAPY TREATMENT NOTE - INPATIENT     Room Number: 346/346-A       Presenting Problem: Acute CHF  Co-Morbidities : COPD, sciatica, recent admission (5/1-5/4) for fall with discharge to [facility]    Problem List  Principal Problem:    Acute on chronic congestive heart failure, unspecified heart failure type (HCC)  Active Problems:    Acute on chronic congestive heart failure (HCC)    Hyperkalemia    QUITA (acute kidney injury) ([facility])      PHYSICAL THERAPY ASSESSMENT   Patient demonstrates limited progress this session, goals  remain in progress.    Patient continues to function below baseline with bed mobility, transfers, gait, stair negotiation, maintaining seated position, standing prolonged periods, performing household tasks, and wheelchair mobility.  Contributing factors to remaining limitations include decreased functional strength,  decreased endurance/aerobic capacity, impaired sitting /standing balance, impaired coordination, impaired motor planning, decreased muscular endurance, and medical status.  Next session anticipate patient to progress bed mobility, transfers, gait, stair negotiation, maintaining seated position, standing prolonged periods, and performing household tasks.  Physical Therapy will continue to follow patient for duration of hospitalization.    Patient continues to benefit from continued skilled PT services: to promote return to prior level of function and safety with continuous assistance and gradual rehabilitative therapy .    PLAN  PT Treatment Plan: Bed mobility;Body mechanics;Endurance;Energy conservation;Patient education;Gait training;Strengthening;Transfer training;Balance training  Frequency (Obs): 3-5x/week    SUBJECTIVE  I feel too exhausted today for the chair I prefer to remain in bed after my PT session. I am so weak I need rest.     OBJECTIVE  Precautions: Bed/chair alarm    WEIGHT BEARING RESTRICTION                PAIN ASSESSMENT   Rating: Unable to rate          BALANCE  Static Sitting: Poor  Dynamic Sitting: Poor -  Static Standing: Dependent  Dynamic Standing: Dependent    ACTIVITY TOLERANCE  Pulse: 86  Heart Rate Source: Monitor  Resp: 16                 O2 WALK  Oxygen Therapy  SPO2% on Oxygen at Rest: 94  At rest oxygen flow (liters per minute): 3  SPO2% Ambulation on Oxygen: 91  Ambulation oxygen flow (liters per minute): 3    AM-PAC '6-Clicks' INPATIENT SHORT FORM - BASIC MOBILITY  How much difficulty does the patient currently have...  Patient Difficulty: Turning over in bed (including adjusting bedclothes, sheets and blankets)?: A Lot   Patient Difficulty: Sitting down on and standing up from a chair with arms (e.g., wheelchair, bedside commode, etc.): A Lot   Patient Difficulty: Moving from lying on back to sitting on the side of the bed?: A Lot   How much help from another person does the  patient currently need...   Help from Another: Moving to and from a bed to a chair (including a wheelchair)?: Total   Help from Another: Need to walk in hospital room?: Total   Help from Another: Climbing 3-5 steps with a railing?: Total     AM-PAC Score:  Raw Score: 9   Approx Degree of Impairment: 81.38%   Standardized Score (AM-PAC Scale): 30.55   CMS Modifier (G-Code): CM    FUNCTIONAL ABILITY STATUS  Functional Mobility/Gait Assessment  Gait Assistance: Maximum assistance  Distance (ft): gustavo  Rolling: moderate assist  Supine to Sit: moderate assist  Sit to Supine: dependent  Sit to Stand: dependent    Additional information: Pt ed with therex in bed for AAROM of UE's and LE's as tolerable. Mod to max A for repositioning rolling for pressure relief and supine to sit in trace sitting. Pt returned to semi maloney position for comfort at end of session. Gustavo for transfers out of bed but pt refused chair during PT am session.     The patient's Approx Degree of Impairment: 81.38% has been calculated based on documentation in the [facility] '6 clicks' Inpatient Daily Activity Short Form.  Research supports that patients with this level of impairment may benefit from IP Rehab.  Final disposition will be made by interdisciplinary medical team.    THERAPEUTIC EXERCISES  Lower Extremity Ankle pumps  Heel slides  SLR     Position Sitting and Supine       Patient End of Session: In bed;With  staff;Needs met;Call light within reach;RN aware of session/findings;All patient questions and concerns addressed;Alarm set    CURRENT GOALS   Goals to be met by: 5/20/2024  Patient Goal Patient's self-stated goal is: Return home    Goal #1 Patient is able to demonstrate supine - sit EOB @ level: independent      Goal #1   Current Status  Mod A    Goal #2 Patient is able to demonstrate transfers Sit to/from Stand at assistance level: modified independent with walker - rolling      Goal #2  Current Status  Max A    Goal #3 Patient is able  to ambulate 50 feet with assist device: walker - rolling at assistance level: modified independent   Goal #3   Current Status  Gustavo transfers to chair   Goal #4 Patient will negotiate 2 stairs/one curb w/ assistive device and supervision   Goal #4   Current Status  Unable   Goal #5 Patient to demonstrate independence with home activity/exercise instructions provided to patient in preparation for discharge.   Goal #5   Current Status  ongoing       Therapeutic Activity: 10 minutes           Physical Therapy Note signed by Dewayne Perdomo PT, DPT W[facility] at 5/8/2024  9:23 AM  Version 1 of 1    Author: Dewayne Perdomo PT, DPT WCC Service: Rehab Author Type: Physical Therapist    Filed: 5/8/2024  9:23 AM Date of Service: 5/8/2024  9:00 AM Status: Signed    : Dewayne Perdomo PT, DPT W[facility] (Physical Therapist)       PHYSICAL THERAPY TREATMENT NOTE - INPATIENT     Room Number: 346/346-A       Presenting Problem: Acute CHF  Co-Morbidities : COPD, sciatica, recent admission (5/1-5/4) for fall with discharge to [facility]    Problem List  Principal Problem:    Acute on chronic congestive heart failure, unspecified heart failure type (HCC)  Active Problems:    Acute on chronic congestive heart failure (HCC)    Hyperkalemia    QUITA (acute kidney injury) ([facility])      PHYSICAL THERAPY ASSESSMENT   Patient demonstrates fair progress this session, goals  remain in progress.    Patient continues to function below baseline with bed mobility, transfers, gait, stair negotiation, maintaining seated position, standing prolonged periods, and performing household tasks.  Contributing factors to remaining limitations include decreased functional strength, decreased endurance/aerobic capacity, impaired standing balance, impaired motor planning, difficulty maintaining precautions, and medical status.  Next session anticipate patient to progress bed mobility, transfers, gait, stair negotiation, maintaining seated position, standing prolonged periods,  and performing household tasks.  Physical Therapy will continue to follow patient for duration of hospitalization.    Patient continues to benefit from continued skilled PT services: to promote return to prior level of function and safety with continuous assistance and gradual rehabilitative therapy .    PLAN  PT Treatment Plan: Bed mobility;Body mechanics;Endurance;Energy conservation;Patient education;Gait training;Range of motion;Strengthening;Transfer training;Balance training  Frequency (Obs): 3-5x/week    SUBJECTIVE  I fel better today but still very weak. I even need help to eat.     OBJECTIVE  Precautions: Bed/chair alarm    WEIGHT BEARING RESTRICTION                PAIN ASSESSMENT   Rating: Unable to rate          BALANCE  Static Sitting: Poor  Dynamic Sitting: Poor -  Static Standing: Dependent  Dynamic Standing: Dependent    ACTIVITY TOLERANCE  Pulse: 87  Heart Rate Source: Monitor  Resp: 18                 O2 WALK  Oxygen Therapy  SPO2% on Oxygen at Rest: 94  At rest oxygen flow (liters per minute): 3  SPO2% Ambulation on Oxygen: 92  Ambulation oxygen flow (liters per minute): 3    AM-PAC '6-Clicks' INPATIENT SHORT FORM - BASIC MOBILITY  How much difficulty does the patient currently have...  Patient Difficulty: Turning over in bed (including adjusting bedclothes, sheets and blankets)?: A Lot   Patient Difficulty: Sitting down on and standing up from a chair with arms (e.g., wheelchair, bedside commode, etc.): A Lot   Patient Difficulty: Moving from lying on back to sitting on the side of the bed?: A Lot   How much help from another person does the patient currently need...   Help from Another: Moving to and from a bed to a chair (including a wheelchair)?: Total   Help from Another: Need to walk in hospital room?: Total   Help from Another: Climbing 3-5 steps with a railing?: Total     AM-PAC Score:  Raw Score: 9   Approx Degree of Impairment: 81.38%   Standardized Score (AM-PAC Scale): 30.55   CMS  Modifier (G-Code): CM    FUNCTIONAL ABILITY STATUS  Functional Mobility/Gait Assessment  Gait Assistance: Maximum assistance  Distance (ft): Gustavo to chair  Rolling: moderate assist  Supine to Sit: maximum assist  Sit to Supine: maximum assist  Sit to Stand: maximum assist    Additional information: Pt ed with bed mobility and transfers with max A for all bed mobility and transfers. Pt ed with use of gustavo to chair dependant transfers at this time pt is not ambulatory with elevated fall risk d/t profound weakness. Therex in chair as tolerated. PCT assisting with eating d/t weakness.     The patient's Approx Degree of Impairment: 81.38% has been calculated based on documentation in the [facility] '6 clicks' Inpatient Daily Activity Short Form.  Research supports that patients with this level of impairment may benefit from IP Rehab PT.  Final disposition will be made by interdisciplinary medical team.    THERAPEUTIC EXERCISES  Lower Extremity Ankle pumps  Heel raises  LAQ     Position Sitting       Patient End of Session: Up in chair;With  staff;Call light within reach;Needs met;RN aware of session/findings;All patient questions and concerns addressed;Alarm set (PCT assisting with meal)    CURRENT GOALS   Goals to be met by: 5/20/2024  Patient Goal Patient's self-stated goal is: Return home    Goal #1 Patient is able to demonstrate supine - sit EOB @ level: independent      Goal #1   Current Status  Mod A    Goal #2 Patient is able to demonstrate transfers Sit to/from Stand at assistance level: modified independent with walker - rolling      Goal #2  Current Status  Max A    Goal #3 Patient is able to ambulate 50 feet with assist device: walker - rolling at assistance level: modified independent   Goal #3   Current Status  Gustavo transfers to chair   Goal #4 Patient will negotiate 2 stairs/one curb w/ assistive device and supervision   Goal #4   Current Status  Unable   Goal #5 Patient to demonstrate independence with  home activity/exercise instructions provided to patient in preparation for discharge.   Goal #5   Current Status  ongoing     Therapeutic Activity: 12 minutes             Physical Therapy Note signed by Dewayne Perdomo PT, DPT [facility] at 5/7/2024 10:45 AM  Version 1 of 1    Author: Dewayne Perdomo PT, DPT WCC Service: Rehab Author Type: Physical Therapist    Filed: 5/7/2024 10:45 AM Date of Service: 5/7/2024  8:45 AM Status: Signed    : Dewayne Perdomo PT, DPT [facility] (Physical Therapist)       PHYSICAL THERAPY EVALUATION - INPATIENT     Room Number: 346/346-A  Evaluation Date: 5/7/2024  Type of Evaluation: Initial   Physician Order: PT Eval and Treat    Presenting Problem: Acute CHF  Co-Morbidities : lethargic, COPD, HTN, HLD, elevated trop  Reason for Therapy: Mobility Dysfunction and Discharge Planning    PHYSICAL THERAPY ASSESSMENT   Patient is a 89 year old female admitted 5/6/2024 for Acute CHF, weakness , lethargy.  Prior to admission, patient's baseline is Lives alone mod indep with a RW, came [facility].  Patient is currently functioning below baseline with bed mobility, transfers, gait, stair negotiation, maintaining seated position, standing prolonged periods, and performing household tasks.  Patient is requiring maximum assist as a result of the following impairments: decreased functional strength, decreased endurance/aerobic capacity, impaired sitting / standing balance, impaired motor planning, decreased muscular endurance, and medical status.  Physical Therapy will continue to follow for duration of hospitalization.    Patient will benefit from continued skilled PT Services to promote return to prior level of function and safety with continuous assistance and gradual rehabilitative therapy .    PLAN  PT Treatment Plan: Bed mobility;Body mechanics;Endurance;Energy conservation;Patient education;Gait training;Range of motion;Strengthening;Transfer training;Balance training  Rehab Potential :  Fair  Frequency (Obs): 3-5x/week    PHYSICAL THERAPY MEDICAL/SOCIAL HISTORY   History related to current admission: 89-year-old  female who lives by herself with underlying COPD and pulmonary artery hypertension, was found by her daughter lying on the floor in her house today. Patient reported that she fell last night. Did not hit her head, but she could not get up because her legs were giving out. She decided to lie on the floor until she was found by her daughter this morning. In the emergency room, urinalysis showed evidence of urinary tract infection. CBC showed white blood cell count of 12.2 with left shift. Chemistry showed GFR 37, which is below her baseline. CK was 175. B-natriuretic peptide 2779. Patient had a chest x-ray which showed emphysema with chronic prominent pulmonary interstitium, slight progression of diffuse interstitial prominence may reflect progression of pulmonary interstitial fibrosis versus atypical viral pneumonia. CT scan of the cervical spine and CT scan of the brain, besides osteoarthritis, no acute findings. Patient stabilized and sent to [facility] for rehab      Problem List  Principal Problem:    Acute on chronic congestive heart failure, unspecified heart failure type (HCC)  Active Problems:    Acute on chronic congestive heart failure (HCC)    Hyperkalemia    QUITA (acute kidney injury) (HCC)      HOME SITUATION  Home Situation  Type of Home: House  Home Layout: One level;Able to live on main level  Lives With: Alone (family in area [facility])  Patient Owned Equipment: Rolling walker     Prior Level of [place]: I am so exhausted I can barely lift my arms. I think I will need rehab to get stronger.     SUBJECTIVE  I do not feel ready for a chair I think I need to stay in bed.     PHYSICAL THERAPY EXAMINATION   OBJECTIVE  Precautions: Bed/chair alarm  Fall Risk: High fall risk    WEIGHT BEARING RESTRICTION  Weight Bearing Restriction: None                PAIN  ASSESSMENT  Rating: Unable to rate          COGNITION  Overall Cognitive Status:  Impaired  Somnolent, lethargic A/O x 2  RANGE OF MOTION AND STRENGTH ASSESSMENT  Upper extremity ROM and strength are within functional limits 2+/5  Lower extremity ROM is within functional limits   Lower extremity strength is within functional limits 2-/5    BALANCE  Static Sitting: Poor  Dynamic Sitting: Poor +  Static Standing: Dependent  Dynamic Standing: Dependent    ACTIVITY TOLERANCE  Pulse: 88  Heart Rate Source: Monitor  Resp: 16  BP: 91/64  BP Location: Right arm  BP Method: Automatic  Patient Position: Sitting    O2 WALK  Oxygen Therapy  SPO2% on Oxygen at Rest: 95  SPO2% Ambulation on Oxygen: 96    AM-PAC '6-Clicks' INPATIENT SHORT FORM - BASIC MOBILITY  How much difficulty does the patient currently have...  Patient Difficulty: Turning over in bed (including adjusting bedclothes, sheets and blankets)?: A Lot   Patient Difficulty: Sitting down on and standing up from a chair with arms (e.g., wheelchair, bedside commode, etc.): A Lot   Patient Difficulty: Moving from lying on back to sitting on the side of the bed?: A Lot   How much help from another person does the patient currently need...   Help from Another: Moving to and from a bed to a chair (including a wheelchair)?: Total   Help from Another: Need to walk in hospital room?: Total   Help from Another: Climbing 3-5 steps with a railing?: Total     AM-PAC Score:  Raw Score: 9   Approx Degree of Impairment: 81.38%   Standardized Score (AM-PAC Scale): 30.55   CMS Modifier (G-Code): CM    FUNCTIONAL ABILITY STATUS  Functional Mobility/Gait Assessment  Gait Assistance: Not tested (lethargic)  Rolling: maximum assist  Supine to Sit: maximum assist  Sit to Supine: maximum assist  Sit to Stand: maximum assist    Exercise/Education Provided:  Bed mobility  Body mechanics  Energy conservation  Functional activity tolerated  Gait training  Posture  ROM  Strengthening  Lower  therapeutic exercise:  Ankle pumps  Heel slides  SLR    The patient's Approx Degree of Impairment: 81.38% has been calculated based on documentation in the [facility] '6 clicks' Inpatient Basic Mobility Short Form.  Research supports that patients with this level of impairment may benefit from IP Rehab PT.  Final disposition will be made by interdisciplinary medical team.    Patient End of Session: Up in chair;With  staff;Needs met;Call light within reach;RN aware of session/findings;All patient questions and concerns addressed;Alarm set    CURRENT GOALS  Goals to be met by: 5/20/2024  Patient Goal Patient's self-stated goal is: Return home    Goal #1 Patient is able to demonstrate supine - sit EOB @ level: independent     Goal #1   Current Status    Goal #2 Patient is able to demonstrate transfers Sit to/from Stand at assistance level: modified independent with walker - rolling     Goal #2  Current Status    Goal #3 Patient is able to ambulate 50 feet with assist device: walker - rolling at assistance level: modified independent   Goal #3   Current Status    Goal #4 Patient will negotiate 2 stairs/one curb w/ assistive device and supervision   Goal #4   Current Status    Goal #5 Patient to demonstrate independence with home activity/exercise instructions provided to patient in preparation for discharge.   Goal #5   Current Status    Goal #6    Goal #6  Current Status      Patient Evaluation Complexity Level:  History Low - no personal factors and/or co-morbidities   Examination of body systems Low -  addressing 1-2 elements   Clinical Presentation Low- Stable   Clinical Decision Making  Low Complexity     Therapeutic Exercise: 10 minutes                  Occupational Therapy Notes (last 72 hours)      Occupational Therapy Note signed by Maliha Lu OT at 5/7/2024 11:46 AM  Version 1 of 1    Author: Maliha Lu OT Service: Rehab Author Type: Occupational Therapist    Filed: 5/7/2024 11:46 AM Date of  Service: 5/7/2024 11:35 AM Status: Signed    : Maliha Lu OT (Occupational Therapist)       OCCUPATIONAL THERAPY EVALUATION - INPATIENT     Room Number: 346/346-A  Evaluation Date: 5/7/2024  Type of Evaluation: Initial  Presenting Problem: hyperkalemia, CHF    Physician Order: IP Consult to Occupational Therapy  Reason for Therapy: ADL/IADL Dysfunction and Discharge Planning    OCCUPATIONAL THERAPY ASSESSMENT   Patient is a 89 year old female admitted 5/6/2024 for hyperkalemia, CHF.  Prior to admission, patient's baseline is mod I for BADLs and household mobility.  Patient is currently functioning below baseline with BADLs and functional mobility.  Patient is requiring maximum assist as a result of the following impairments: decreased functional strength, decreased endurance, pain, decreased muscular endurance, medical status, and increased O2 needs from baseline. Occupational Therapy will continue to follow for duration of hospitalization.    Patient will benefit from continued skilled OT Services to promote return to prior level of function and safety with continuous assistance and gradual rehabilitative therapy     PLAN  OT Treatment Plan: Balance activities;ADL training;Functional transfer training;Endurance training;Patient/Family education;Patient/Family training     OCCUPATIONAL THERAPY MEDICAL/SOCIAL HISTORY   Problem List  Principal Problem:    Acute on chronic congestive heart failure, unspecified heart failure type (HCC)  Active Problems:    Acute on chronic congestive heart failure (HCC)    Hyperkalemia    QUITA (acute kidney injury) ([facility])    HOME SITUATION  Type of Home: House  Home Layout: One level  Lives With: Alone (gordy is her primary CG and assists as needed with IADLs)  Drives: No  Patient Regularly Uses: Home O2 (typically PRN but for 2 weeks leading up to recent admission, in place 24/7 per neice)      Prior Level of [place]: Pt lives alone and prior to recent admissions  (5/1-5/4/24) pt was managing BADLs independently and household mobility using R/W. Pt's niece is her primary CG and assists PRN with IADLs.     SUBJECTIVE  \"I'm exhausted\"    OCCUPATIONAL THERAPY EXAMINATION    OBJECTIVE  Precautions: Bed/chair alarm  Fall Risk: High fall risk    PAIN ASSESSMENT  Rating: Unable to rate  Location: generalized  Management Techniques: Relaxation; Repositioning    ACTIVITY TOLERANCE  Poor- appears distressed with conversation Pt tolerates minimal bd level activity with indication of fatigue, generalized \"soreness\", labored breathing    O2 SATURATIONS  Oxygen Therapy  SPO2% on Oxygen at Rest: 93  At rest oxygen flow (liters per minute): 3  SPO2% Ambulation on Oxygen: 89  Ambulation oxygen flow (liters per minute): 3    COGNITION  Awake  Pt is able to follow commands to participate in bed level activity/self care    Communication: able to communicate her needs    Behavioral/Emotional/Social: appropriate, mostly flat with low energy    RANGE OF MOTION   Upper extremity ROM is within functional limits       ACTIVITIES OF DAILY LIVING ASSESSMENT  AM-PAC ‘6-Clicks’ Inpatient Daily Activity Short Form  How much help from another person does the patient currently need…  -   Putting on and taking off regular lower body clothing?: A Lot  -   Bathing (including washing, rinsing, drying)?: A Lot  -   Toileting, which includes using toilet, bedpan or urinal? : Total  -   Putting on and taking off regular upper body clothing?: A Lot  -   Taking care of personal grooming such as brushing teeth?: A Lot  -   Eating meals?: A Little    AM-PAC Score:  Score: 12  Approx Degree of Impairment: 66.57%  Standardized Score (AM-PAC Scale): 30.6  CMS Modifier (G-Code): CL    FUNCTIONAL TRANSFER ASSESSMENT  Sit to Stand: -- (Not appropraite this session)    BED MOBILITY  Rolling: Maximum Assist  Supine to Sit : -- (pt declined)  Sit to Supine (OT): Not Tested      FUNCTIONAL ADL ASSESSMENT  Grooming: Min A for OFH  from bed level limited by generalized weakness  UE self care: Max A  LE self care: Max A  Toileting: dependent    Skilled Therapy Provided: Pt received sitting up in bed with supportive niece present. Pt is pleasant, displaying low energy. Pt declines to OOB as well as declines EOB but agreeable to bed level activity. Pt assisted to change brief and external catheter providing Max A for BENNETT rolling multiple times and dependent assistance to reposition to HOB/side lying. At completion of session pt positioned in side lying with HOB elevated at pt's request reporting improved ease of breathing and back pain. Call light in reach and bed alarm on. Niece at bed side indicates pln is to return to [facility].      EDUCATION PROVIDED  Patient: Role of Occupational Therapy; Discharge Recommendations; Posture/Positioning (pressure relief, relaxaton and breathing techniques)  Patient's Response to Education: Requires Further Education  Family/Caregiver's Response to Education: Verbalized Understanding    The patient's Approx Degree of Impairment: 66.57% has been calculated based on documentation in the [facility] '6 clicks' Inpatient Daily Activity Short Form.  Research supports that patients with this level of impairment may benefit from [facility].  Final disposition will be made by interdisciplinary medical team.     Patient End of Session: In bed;Needs met;Call light within reach;RN aware of session/findings;All patient questions and concerns addressed;Family present;Alarm set    OT Goals  Patients self stated goal is: pt did not state, niece indicates goal is to return to rehab    Pt will perform bed mobility with Min A for pressure relief and supine self care  Comment:    Pt will tolerate sitting EOB x5 minutes with CGA for participation in self care  Comment:    Pt will perform grooming/UB self care with set up 1x from supported sitting  Comment:     Comment:          Goals  on: 24  Frequency: 3x/week    Patient Evaluation  Complexity Level:   Occupational Profile/Medical History MODERATE - Expanded review of history including review of medical or therapy record   Specific performance deficits impacting engagement in ADL/IADL MODERATE  3 - 5 performance deficits   Client Assessment/Performance Deficits MODERATE - Comorbidities and min to mod modifications of tasks    Clinical Decision Making MODERATE - Analysis of occupational profile, detailed assessments, several treatment options    Overall Complexity MODERATE     Self-Care Home Management: 25 minutes               Video Swallow Study Notes    No notes of this type exist for this encounter.     SLP Notes    No notes of this type exist for this encounter.     Immunizations     Name Date      Covid-19 Moderna 04/12/22     Covid-19 Moderna 11/02/21     Covid-19 Moderna 03/29/21     Covid-19 Moderna 02/28/21     Fluad 0.5ml 10/14/20     INFLUENZA 10/01/21     INFLUENZA 09/01/17     INFLUENZA 10/17/15     INFLUENZA 10/27/05     INFLUENZA 10/19/04     Influenza Virus Vaccine, H1N1 12/23/09     Pneumococcal (Prevnar 13) 07/10/17     Pneumovax 23 10/23/04       Future Appointments        Provider Department Center    10/28/2024 3:15 PM Placido Hutchins MD [facility]      Multidisciplinary Problems     Active Goals     Not on file          Resolved Goals        Problem: Patient/Family Goals    Goal Priority Disciplines Outcome Interventions   Patient/Family Long Term Goal   (Resolved)     Interdisciplinary Completed    Description: Patient's Long Term Goal: to return home    Interventions:  - vital signs  - follow md orders  - See additional Care Plan goals for specific interventions   Patient/Family Short Term Goal   (Resolved)     Interdisciplinary Completed    Description: Patient's Short Term Goal: to get better    Interventions:   - follow medication plan  - See additional Care Plan goals for specific interventions

## (undated) NOTE — LETTER
08/21/18        Elinor      Dear Truong Wallace,    Our records indicate that you have outstanding lab work and or testing that was ordered for you and has not yet been completed:          CMP      TSH W Refl

## (undated) NOTE — IP AVS SNAPSHOT
[facility]            (For Outpatient Use Only) Initial Admit Date: 2022   Inpt/Obs Admit Date: Inpt: 22 / Obs: N/A   Discharge Date:    Acosta Interiano:  [de-identified]   MRN: [de-identified]   CSN: [id number]   CEID: [id number]        ENCOUNTER  Patient Class: Inpatient Admitting Provider: Srinivasan Newell MD Unit: [address] [place] [address]   Hospital Service: Cardiac Telemetry Attending Provider: Shonna Key MD   Bed: 301-A   Visit Type:   Referring Physician: No ref. provider found Billing Flag:    Admit Diagnosis: Elevated troponin I level [R77.8]      PATIENT  Legal Name:   Cephus Dubin   Legal Sex: Female  Gender ID:              Pref Name:    PCP:  Fadi Molina MD Home: [phone]   Address:  [address] [place] [address] : 1926 (89 yrs) Mobile: [phone]         City/State/Zip: [address] [place] [id number] Marital: Single Language: [address] [place] [address] Drive: [facility] [place]4: [id number] Restorationism: None     Race: White Ethnicity: Non  Or  O*   EMERGENCY CONTACT   Name Relationship Legal Guardian? Home Phone Work Phone Mobile Phone   1.  Christina Pike  2. *No Contact Specified* Sister      Terra Hsu: Lata SUTTON : 1926 Home Phone: [phone]   Address: [address] [place] [address] Drive  Sex: Female Work Phone:    City/[facility]/Zip: [place], [place]   Rel. to Patient: Self Guarantor ID: [id number]   [address]   Employer:  Status: RETIRED     COVERAGE  PRIMARY INSURANCE   Payor: MEDICARE Plan: MEDICARE PART A&B   Group Number: [id number] Insurance Type: [id number] Name: Jacoby Ruano : 1926   Subscriber ID: [id number] Pt Rel to Subscriber: Self   SECONDARY INSURANCE   Payor: [address] [place] [address] Drive: [address] [place] [address]   Group Number: [id number] Insurance Type: [id number] Name: Jacoby Ruano : 1926   Subscriber ID: [id number] Pt Rel to Subscriber: SELF   TERTIARY INSURANCE   Payor:  Plan: Group Number:  Insurance Type:    Subscriber Name:  Subscriber :    Subscriber ID:  Pt Rel to Subscriber:    Hospital Account Financial Class: Medicare    2022

## (undated) NOTE — LETTER
July 13, 2017     Elinor      Dear Ольга Lee:    Below are the results from your recent visit:    Resulted Orders   URINALYSIS NONAUTO W/O SCOPE   Result Value Ref Range    GLUCOSE (URINE DIPSTICK) neg

## (undated) NOTE — IP AVS SNAPSHOT
Patient Demographics     Address  [address]  [facility] [address] Phone  [phone] (Home) *Preferred*  [phone] (Mobile)      Patient Contacts     Name Relation Home Work Christina Mora [phone]      Allergies as of 5/4/2024  Review status set to In Progress on 5/1/2024       Noted Reaction Type Reactions    Bacitracin 03/22/2023   Systemic UNKNOWN    Neosporin Original [neomycin-bacitracin-polymyxin] 06/30/2016    ITCHING    Polymyxin B 03/22/2023   Systemic UNKNOWN      Code Status Information     Code Status    DNAR/Selective Treatment        Patient Instructions       [facility] APN to follow at discharge. Please call Residential Palliative care with any questions, they can be reached by phone at [phone].   Follow-up with PCP in 1-2 weeks  Follow-up with community palliative care  Call MD with any concerns     Follow-up Information     Asha Mendes MD Follow up.    Specialty: Internal Medicine  Contact information:  [address] [facility] [address]  [phone]                        Your Home Meds List      TAKE these medications       Instructions Authorizing Provider Morning Afternoon Evening As Needed   acetaminophen 500 MG Tabs  Commonly known as: Tylenol Extra Strength      Take 1 tablet (500 mg total) by mouth every 4 (four) hours as needed for Fever (equal to or greater than 100.4).   Kenya Pike         albuterol 108 (90 Base) MCG/ACT Aers  Commonly known as: Ventolin HFA      inhale 2 puff by inhalation route  every 4 - 6 hours as needed   Placido Hutchins         atorvastatin 10 MG Tabs  Commonly known as: Lipitor  Next dose due: Tonight      Take 1 tablet (10 mg total) by mouth nightly.   Placido Hutchins         cefadroxil 500 MG Caps  Commonly known as: DURICEF  Next dose due: Tonight      Take 1 capsule (500 mg total) by mouth 2 (two) times daily.   BAUTISTA LUND         docusate sodium 100 MG Caps  Commonly known as:  COLACE  Next dose due: Tonight       Take 100 mg by mouth 2 (two) times daily.   BAUTISTA LUND         fluticasone furoate-vilanterol 100-25 MCG/ACT Aepb  Commonly known as: Breo Ellipta  Next dose due: Tomorrow       Inhale 1 puff into the lungs every morning. To follow with gargle, rinse, & spit after using BREO to help reduce your chance of getting thrush.   Placido Hutchins         ipratropium-albuterol 0.5-2.5 (3) MG/3ML Soln  Commonly known as: Duoneb      Take 3 mL by nebulization every 6 (six) hours as needed.   BAUTISTA LUND         Polyethylene Glycol 3350 17 g Pack  Commonly known as: MIRALAX      Take 17 g by mouth daily as needed.   BAUTISTA LUND                  [id number] - MAR ACTION REPORT  (last 48 hrs)    ** SITE UNKNOWN **     Order ID Medication Name Action Time Action Reason Comments    [id number] acetaminophen (Tylenol Extra Strength) tab 500 mg 05/03/24 1351 Given      [id number] acetaminophen (Tylenol Extra Strength) tab 500 mg 05/04/24 0019 Given      [id number] atorvastatin (Lipitor) tab 10 mg 05/02/24 2126 Given      [id number] atorvastatin (Lipitor) tab 10 mg 05/03/24 2006 Given      [id number] ceFAZolin (Ancef) 2 g in 20mL IV syringe premix 05/03/24 1519 Given      [id number] ceFAZolin (Ancef) 2 g in 20mL IV syringe premix 05/04/24 0446 Given      [id number] docusate sodium (Colace) cap 100 mg 05/04/24 1121 Given      [id number] fluticasone furoate-vilanterol (Breo Ellipta) 100-25 MCG/ACT inhaler 1 puff 05/03/24 1121 Given      [id number] fluticasone furoate-vilanterol (Breo Ellipta) 100-25 MCG/ACT inhaler 1 puff 05/04/24 0932 Given            LEFT UPPER ARM     Order ID Medication Name Action Time Action Reason Comments    [id number] heparin (Porcine) 5000 UNIT/ML injection 5,000 Units 05/03/24 0901 Given            RIGHT LOWER ABDOMEN     Order ID Medication Name Action Time Action Reason Comments    [id number] heparin (Porcine) 5000 UNIT/ML injection 5,000 Units 05/02/24 2126 Given       [id number] heparin (Porcine) 5000 UNIT/ML injection 5,000 Units 05/03/24 2006 Given            RIGHT UPPER ARM     Order ID Medication Name Action Time Action Reason Comments    [id number] heparin (Porcine) 5000 UNIT/ML injection 5,000 Units 05/04/24 0932 Given              Recent Vital Signs    Flowsheet Row Most Recent Value   /79 Filed at 05/04/2024 0923   Pulse 76 Filed at 05/04/2024 0923   Resp 20 Filed at 05/04/2024 0444   Temp 97.5 °F (36.4 °C) Filed at 05/04/2024 0923   SpO2 96 % Filed at 05/04/2024 0923      Patient's Most Recent Weight    Flowsheet Row Most Recent Value   Patient Weight 51.5 kg (113 lb 8 oz)         Lab Results Last 24 Hours      Comp Metabolic Panel (14) [[id number]] (Abnormal)  Resulted: 05/04/24 0532, Result status: Final result   Ordering provider: Rancho Garcia APRN  05/03/24 2300 Resulting lab: [facility] ([facility])    Specimen Information    Type Source Collected On   Blood — 05/04/24 7776          Components    Component Value Reference Range Flag Lab   Glucose 112 70 - 99 mg/dL H [facility])   Sodium 135 136 - 145 mmol/L L [facility])   Potassium 4.3 3.5 - 5.1 mmol/L — [facility])   Chloride 103 98 - 112 mmol/L — [facility])   CO2 24.0 21.0 - 32.0 mmol/L — [facility])   Anion Gap 8 0 - 18 mmol/L — [facility])   BUN 63 9 - 23 mg/dL H [facility])   Creatinine 1.25 0.55 - 1.02 mg/dL H [facility])   BUN/CREA Ratio 50.4 10.0 - 20.0 H [facility])   Calcium, Total 8.9 8.7 - 10.4 mg/dL — [facility])   Calculated Osmolality 299 275 - 295 mOsm/kg H [facility])   eGFR-Cr 39 >=60 mL/min/1.73m2 L [facility] ([facility])    10 - 49 U/L H [facility] ([facility])   AST 83 <=34 U/L H [facility] ([facility])   Alkaline Phosphatase 305 55 - 142 U/L H [facility] ([facility])   Bilirubin, Total 0.9 0.2 - 0.9 mg/dL — [facility] ([facility])   Total Protein 5.8 5.7 - 8.2 g/dL — [facility] ([facility])   Albumin 3.4  3.2 - 4.8 g/dL — [facility] ([facility])   Globulin  2.4 2.0 - 3.5 g/dL — [facility] ([facility])   A/G Ratio 1.4 1.0 - 2.0 — [facility] ([facility])            Magnesium [[id number]] (Normal)  Resulted: 05/04/24 0532, Result status: Final result   Ordering provider: Rancho Garcia APRN  05/03/24 2300 Resulting lab: [facility] ([facility])    Specimen Information    Type Source Collected On   Blood — 05/04/24 0426          Components    Component Value Reference Range [facility]   Magnesium 2.6 1.6 - 2.6 mg/dL — [facility])            CBC With Differential With Platelet [[id number]] (Abnormal)  Resulted: 05/04/24 0503, Result status: Final result   Ordering provider: Rancho Garcia APRN  05/03/24 2300 Resulting lab: [facility] ([facility])   Narrative:  The following orders were created for panel order CBC With Differential With Platelet.  Procedure                               Abnormality         Status                     ---------                               -----------         ------                     CBC W/ DIFFERENTIAL[[id number]]          Abnormal            Final result                 Please view results for these tests on the individual orders.    Specimen Information    Type Source Collected On   Blood — 05/04/24 0426            Testing Performed By     Lab - Abbreviation Name Director Address Valid Date Range    162 - [facility] ([facility]) [facility] ([facility]) [facility] [id number] 03/19/20 1442 - Present            Microbiology Results (All)     Procedure Component Value Units Date/Time    Urine Culture, Routine [[id number]]  (Abnormal)  (Susceptibility) Collected: 05/01/24 1127    Order Status: Completed Lab Status: Final result Updated: 05/03/24 0622    Specimen: Urine, clean catch      Urine Culture >100,000 CFU/ML Klebsiella pneumoniae    Susceptibility      Klebsiella pneumoniae      Not  Specified     Ampicillin >=32  Resistant     Ampicillin + Sulbactam 4  Sensitive     Cefazolin <=4  Sensitive     Ciprofloxacin <=0.25  Sensitive     Gentamicin <=1  Sensitive     Levofloxacin <=0.12  Sensitive     Meropenem <=0.25  Sensitive     Nitrofurantoin 32  Sensitive     Piperacillin + Tazobactam <=4  Sensitive     Trimethoprim/Sulfa <=20  Sensitive                          SARS-CoV-2/Flu A and B/RSV by PCR (GeneXpert) [[id number]]  (Normal) Collected: 05/01/24 1014    Order Status: Completed Lab Status: Final result Updated: 05/01/24 1241    Specimen: Other from Nares      SARS-CoV-2 (COVID-19) - (GeneXpert) Not Detected     Influenza A by PCR Negative     Influenza B by PCR Negative     RSV by PCR Negative    Narrative:      This test is intended for the qualitative detection and differentiation of SARS-CoV-2, influenza A, influenza B, and respiratory syncytial virus (RSV) viral RNA in nasopharyngeal or nares swabs from individuals suspected of respiratory viral infection consistent with COVID-19 by their healthcare provider. Signs and symptoms of respiratory viral infection due to SARS-CoV-2, influenza, and RSV can be similar.    Test performed using the Xpert Xpress SARS-CoV-2/FLU/RSV (real time RT-PCR)  assay on the GeneXpert instrument, [employer], [place] [address].   This test is being used under the Food and Drug Administration's Emergency Use Authorization.    The authorized Fact Sheet for Healthcare Providers for this assay is available upon request from the laboratory.         H&P - H&P Note      H&P signed by Dangelo Novak MD at 5/2/2024  9:55 AM   Version 1 of 1    Author: Dangelo Novak MD Service: Hospitalist Author Type: Physician    Filed: 5/2/2024  9:55 AM Status: Signed    : Dangelo Novak MD (Physician)       [facility]    PATIENT'S NAME: ANDREEA BARTH   ATTENDING PHYSICIAN: Meera Hodgson MD   PATIENT ACCOUNT#:   [id number]    LOCATION:  [address]  10  MEDICAL RECORD #:   [id number]       YOB: 1926  ADMISSION DATE:       05/01/2024    HISTORY AND PHYSICAL EXAMINATION    CHIEF COMPLAINT:  Urinary tract infection, generalized weakness.    HISTORY OF PRESENT ILLNESS:  Patient is a 89-year-old  female who lives by herself with underlying COPD and pulmonary artery hypertension, was found by her daughter lying on the floor in her house today.  Patient reported that she fell last night.  Did not hit her head, but she could not get up because her legs were giving out.  She decided to lie on the floor until she was found by her daughter this morning.  In the emergency room, urinalysis showed evidence of urinary tract infection.  CBC showed white blood cell count of 12.2 with left shift.  Chemistry showed GFR 37, which is below her baseline.  CK was 175.  B-natriuretic peptide 2779.  Patient had a chest x-ray which showed emphysema with chronic prominent pulmonary interstitium, slight progression of diffuse interstitial prominence may reflect progression of pulmonary interstitial fibrosis versus atypical viral pneumonia.  CT scan of the cervical spine and CT scan of the brain, besides osteoarthritis, no acute findings.  Patient will be admitted to the hospital for further management.      PAST MEDICAL HISTORY:  Chronic obstructive pulmonary disease, emphysema, pulmonary artery hypertension with severe tricuspid regurgitation and possible chronic right-sided heart failure.  She has chronically elevated troponins.  She uses oxygen at home.  Hyperlipidemia, osteoarthritis, and osteoporosis with prior vertebral compression fractures from mechanical falls.      PAST SURGICAL HISTORY:  Appendectomy, bilateral cataract procedure, total hip arthroplasty, tonsillectomy, and basal cell carcinoma resection.      MEDICATIONS:  Please see medication reconciliation list.     ALLERGIES:  No known drug allergies.     FAMILY HISTORY:  Mother had heart disease.   Father had hypertension and cancer.      SOCIAL HISTORY:  Ex-tobacco user.  Lives by herself.  Uses a walker.  Usually independent for basic activities of daily living, but she is high risk of falling.     REVIEW OF SYSTEMS:  Patient reports that lately she has been feeling weaker.  She cannot give me specific symptoms of dysuria or urine frequency.  No fever or chills.  She does have chronic cough, nonproductive.  She is chronically oxygen dependent and uses oxygen at home.  She uses a rolling walker to ambulate around.  Last night, she fell down and managed to lower herself to the floor and could not get up because her legs were giving out.  Other 12-point review of systems is negative.       PHYSICAL EXAMINATION:    GENERAL:  Alert and oriented to time, place and person.  Seems fatigued but no acute distress.   VITAL SIGNS:  Temperature 97.5, pulse 96, respiratory rate 20, blood pressure 104/78, pulse 94% on 6L nasal cannula oxygen.   HEENT:  Atraumatic.  Oropharynx clear.  Dry mucous membranes.    NECK:  Supple.  No lymphadenopathy.  No jugular venous distention.   LUNGS:  Clear to auscultation bilaterally.  Normal respiratory effort.    HEART:  Regular rate and rhythm.  S1 and S2 auscultated.  Systolic murmur best heard at the apex and right second intercostal space.    ABDOMEN:  Soft, nondistended.  No tenderness.  Positive bowel sounds.   EXTREMITIES:  No edema, clubbing or cyanosis.   NEUROLOGIC:  Motor and sensory intact.  Cranial nerves II to XII are intact.      ASSESSMENT AND PLAN:  1.   Generalized weakness with underlying musculoskeletal deconditioning and chronic lung disease.    2.   Urinary tract infection.    3.   Acute kidney injury.  4.   Elevated troponin of unclear clinical significance.    PLAN:  Patient will be admitted to general medical floor, remote telemetry.  Continue to trend troponin level.  Gentle hydration.  IV Rocephin.  Fall precautions.  Physical and occupational therapy.  Social  Services to evaluate patient for possible subacute rehab placement.  Further recommendations to follow.     Dictated By Dangelo Novak MD  d: 2024 12:24:09  t: 2024 12:41:42  [facility] [id number]  FB/    Electronically signed by Dangelo Novak MD on 2024  9:55 AM              Consults - MD Consult Notes      Consults signed by Emmy Valadez DO at 5/3/2024  9:25 AM     Author: Emmy Valadez DO Service: Pulmonology Author Type: Physician    Filed: 5/3/2024  9:25 AM Date of Service: 5/3/2024  9:20 AM Status: Signed    : Emmy Valadez DO (Physician)     Consult Orders    1. Consult to Pulmonology [[id number]] ordered by Demi Arthur MD at 24 1703             [facility]  part of [facility]    Report of Consultation    Lina Dudley Patient Status:  Inpatient    1926 MRN [id number]   Location [facility] 3W/SW Attending Demi Arthur MD   Hosp Day # 2 PCP ROBYN KULKARNI MD     Date of Admission:  2024    Reason for Consultation:   Hypoxia, possible ILD    History of Present Illness:   Patient is a 89-year-old female with underlying history of COPD, pulmonary hypertension who was found after evidence of generalized weakness found on floor in her house.  Patient chest x-ray with some concern for increased interstitial prominence with concern for possible ILD.  Resting in bed this morning.  No significant respiratory distress or dyspnea per patient.  Admits to underlying history of COPD.  Unaware of any history of ILD.  Prior history of tobacco abuse.  Currently on 3 L nasal cannula oxygen.  Denies significant cough    Past Medical History  Past Medical History:    Appendicitis    APPENDECTOMY    Arthritis    Basal cell carcinoma    Of Face     Chicken pox    COPD (chronic obstructive pulmonary disease) (HCC)    Left arm numbness    Measles    Other and unspecified hyperlipidemia    Pneumonia    Tonsillitis    Unspecified  essential hypertension       Past Surgical History  Past Surgical History:   Procedure Laterality Date    Appendectomy      Cataract extraction Bilateral     Dr. Tirado's office    Glaucoma surgery      Hip replacement surgery      Skin surgery  Excision of Basal cell carcinoma of face     Tonsillectomy      T&A       Family History  Family History   Problem Relation Age of Onset    Cancer Father     Hypertension Father     Heart Disease Father     Heart Disease Mother     Gastro-Intestinal Disorder Maternal Grandmother         TUBERCULOSIS    Breast Cancer Maternal Aunt         80s    Neurological Disorder Other         GREAT AUNT, EPILEPSY    Lipids Other         HYPERLIPIDEMIA    Glaucoma Neg     Diabetes Neg        Social History  Social History     Socioeconomic History    Marital status: Single   Tobacco Use    Smoking status: Former     Current packs/day: 0.00     Average packs/day: 1 pack/day for 30.0 years (30.0 ttl pk-yrs)     Types: Cigarettes     Start date: 1960     Quit date: 1990     Years since quittin.3    Smokeless tobacco: Former   Vaping Use    Vaping status: Never Used   Substance and Sexual Activity    Alcohol use: No     Alcohol/week: 2.0 standard drinks of alcohol     Types: 2 Glasses of wine per week     Comment: rare    Drug use: No   Other Topics Concern    Caffeine Concern Yes     Comment: COFFEE 3 CUPS/DAY           Current Medications:  Current Facility-Administered Medications   Medication Dose Route Frequency    ceFAZolin (Ancef) 2 g in 20mL IV syringe premix  2 g Intravenous Q12H    heparin (Porcine) 5000 UNIT/ML injection 5,000 Units  5,000 Units Subcutaneous 2 times per day    guaiFENesin (Robitussin) 100 MG/5 ML oral liquid 200 mg  200 mg Oral Q4H PRN    sodium chloride 0.9% infusion   Intravenous Continuous    atorvastatin (Lipitor) tab 10 mg  10 mg Oral Nightly    acetaminophen (Tylenol Extra Strength) tab 500 mg  500 mg Oral Q4H PRN     Medications Prior to  Admission   Medication Sig    albuterol 108 (90 Base) MCG/ACT Inhalation Aero Soln inhale 2 puff by inhalation route  every 4 - 6 hours as needed    atorvastatin 10 MG Oral Tab Take 1 tablet (10 mg total) by mouth nightly.    fluticasone furoate-vilanterol (BREO ELLIPTA) 100-25 MCG/ACT Inhalation Aerosol Powder, Breath Activated Inhale 1 puff into the lungs every morning. To follow with gargle, rinse, & spit after using BREO to help reduce your chance of getting thrush.    acetaminophen 500 MG Oral Tab Take 1 tablet (500 mg total) by mouth every 4 (four) hours as needed for Fever (equal to or greater than 100.4).       Allergies  Allergies   Allergen Reactions    Bacitracin UNKNOWN    Neosporin Original [Neomycin-Bacitracin-Polymyxin] ITCHING    Polymyxin B UNKNOWN       Review of Systems:   Constitutional: denies fevers, chills, weakness, fatigue, recent illness  HEENT: denies headache, sore throat, vision loss  Cardio: denies chest pain, chest pressure, palpitations  Respiratory: denies dyspnea, cough, wheezing, hemoptysis   GI: denies nausea, vomiting, abdominal pain  : denies dysuria, hematuria  Musculoskeletal: denies arthralgia, myalgia  Integumentary: denies rash, itching  Neurological: denies syncope, weakness, dizziness,   Psychiatric: denies depression, anxiety  Hematologic: denies bruising        Physical Exam:   Blood pressure 103/80, pulse 94, temperature 97.8 °F (36.6 °C), temperature source Oral, resp. rate 22, height 5' 5\" (1.651 m), weight 112 lb 14.4 oz (51.2 kg), SpO2 95%, not currently breastfeeding.    Constitutional: no acute distress  Eyes: PERRL  ENT: nares patent  Neck: neck supple, no JVD  Cardio: RRR, S1 S2  Respiratory: Faint basilar crackles  GI: abdomen soft, non tender, active bowel souds, no organomegaly  Extremities: no clubbing, cyanosis, edema  Neurologic: no gross motor deficits  Skin: warm, dry    Results:   Laboratory Data  Lab Results   Component Value Date    WBC 9.6  05/03/2024    HGB 15.7 05/03/2024    HCT 45.3 05/03/2024    .0 05/03/2024    CREATSERUM 1.08 (H) 05/03/2024    BUN 45 (H) 05/03/2024     (L) 05/03/2024    K 4.9 05/03/2024     05/03/2024    CO2 18.0 (L) 05/03/2024     (H) 05/03/2024    CA 9.1 05/03/2024    ALB 2.4 (L) 08/19/2022    ALKPHO 79 08/19/2022    TP 5.4 (L) 08/19/2022    AST 20 08/19/2022    ALT 34 08/19/2022    TSH 3.47 08/28/2018    MG 2.6 05/03/2024     (H) 05/01/2024         Imaging  XR CHEST AP PORTABLE  (CPT=71045)    Result Date: 5/1/2024  CONCLUSION:  1. Emphysematous changes with chronic prominent pulmonary interstitium. 2. Slight progression of diffuse interstitial prominence may reflect progression of pulmonary interstitial fibrosis versus superimposed atypical viral pneumonia.  Recommend clinical correlation. 3. Borderline cardiomegaly.  Prominent central vasculature.    Dictated by (CST): Sky Linn MD on 5/01/2024 at 11:13 AM     Finalized by (CST): Sky Linn MD on 5/01/2024 at 11:16 AM          CT SPINE CERVICAL (CPT=72125)    Result Date: 5/1/2024  CONCLUSION:  1. No acute fracture or posttraumatic malalignment of the cervical spine is demonstrated.  2. Substantial multilevel degenerative changes of cervical spine are evident.  3. Partially visualized severe centrilobular emphysema.  4. Incompletely delineated right pleural effusion.  5. Lesser incidental findings as above.    Dictated by (CST): Vitaliy Tran MD on 5/01/2024 at 11:11 AM     Finalized by (CST): Vitaliy Tran MD on 5/01/2024 at 11:13 AM          CT BRAIN OR HEAD (44521)    Result Date: 5/1/2024  CONCLUSION:  1. Negative for depressed calvarial fracture, coup/contrecoup intraparenchymal contusion, intracranial hemorrhage, or further evidence of acute intracranial process by noncontrast CT technique.  2. Senescent changes of parenchymal volume loss with sequela of chronic microvascular ischemic disease.  3. There is also  large vessel atherosclerosis involving the anterior and posterior intracranial circulations.  4. Extensive paranasal sinus disease is apparent.  5. Lesser incidental findings as above.      Dictated by (CST): Vitaliy Tran MD on 2024 at 11:07 AM     Finalized by (CST): Vitaliy Tran MD on 2024 at 11:09 AM           Assessment   1.  Generalized weakness  2.  UTI  3.  COPD  4.  Possible ILD  5.  Acute kidney injury  6.  Chronic respiratory failure    Plan   -She presents with evidence of generalized weakness with concern for UTI on presentation  -Chest x-ray on presentation with evidence of some increased interstitial opacities concerning for possible ILD  -Patient does admit to use of home oxygen therapy although details unclear  -Underlying history of COPD.  Continue ICS/LABA  -Nebulizer treatments  -Encouraged ongoing tobacco cessation  -Based on chest x-ray results cannot definitely rule out possibility of potential ILD but given patient's overall age and functional status would likely not be a candidate for any aggressive therapy from ILD perspective.  I do not believe CT high-resolution chest will change her medical management at this time.  -Continue supplemental oxygen as needed.  -Hold off steroid therapy from pulmonary perspective  -Reviewed vitals, labs and imaging    Emmy Valadez DO  Pulmonary Critical Care Medicine  [facility]  5/3/2024  9:20 AM     Electronically signed by Emmy Valadez DO on 5/3/2024  9:25 AM           D/C Summary    No notes of this type exist for this encounter.     Imaging Results (HF patients)    Chest X-Ray Results (HF patients only)    No exam resulted this encounter.      2D Echocardiogram Results (HF patients only)    CARD ECHO 2D DOPPLER (CPT=93306)    Result Date: 2024  Transthoracic Echocardiogram Name:Lina Dudley Date: 2024 :  1926 Ht:  (65in)  BP: 104 / 71 MRN:  [id number]    Age:  89years    Wt:  (112lb) HR: 92bpm Loc:   [facility]       Gndr: F          BSA: 1.55m^2 Sonographer: Clarissa Greenwood Ordering:    [employer] Consult[employer]:  Todd Pedroza ---------------------------------------------------------------------------- History/Indications:   Congestive heart failure. Blood tests:    Troponin elevated. ---------------------------------------------------------------------------- Procedure information:  A transthoracic complete 2D study was performed. Additional evaluation included M-mode, complete spectral Doppler, and color Doppler.  Patient status:  Outpatient.  Location:  Bedside.    Comparison was made to the study of 08/15/2022.    This was a routine study. Transthoracic echocardiography for diagnosis and ventricular function evaluation. Image quality was adequate. ECG rhythm:   Normal sinus ---------------------------------------------------------------------------- Conclusions: 1. Left ventricle: The cavity size was normal. Wall thickness was normal.    Systolic function was normal. The estimated ejection fraction was 60-65%,    by visual assessment. No diagnostic evidence for regional wall motion    abnormalities. Left ventricular diastolic function parameters were normal    for the patient's age. 2. Right ventricle: The cavity size was increased. 3. Left atrium: The left atrial volume was normal. 4. Right atrium: The atrium was dilated. 5. Aortic valve: The valve was trileaflet. The leaflets were mildly    thickened and mildly calcified. 6. Mitral valve: The leaflets were mildly calcified. Prolapse. There was    moderate regurgitation. 7. Tricuspid valve: There was moderate-severe regurgitation. 8. Pulmonary arteries: Systolic pressure was moderately to markedly    increased, estimated to be 69mm Hg. Impressions:  This study is compared with previous dated 08/15/2022: * ---------------------------------------------------------------------------- * Findings: Left ventricle:  The cavity size was normal. Wall thickness was  normal. Systolic function was normal. The estimated ejection fraction was 60-65%, by visual assessment. No diagnostic evidence for regional wall motion abnormalities. Left ventricular diastolic function parameters were normal for the patient's age. Left atrium:  The atrium was normal in size. The left atrial volume was normal. Right ventricle:  The cavity size was increased. Systolic function was low normal. Right atrium:  The atrium was dilated. Mitral valve:  The leaflets were mildly calcified. Leaflet separation was normal.  Prolapse.  Doppler:  Transvalvular velocity was within the normal range. There was no evidence for stenosis. There was moderate regurgitation. Aortic valve:   The valve was trileaflet. The leaflets were mildly thickened and mildly calcified.  Doppler:  Transvalvular velocity was within the normal range. There was no evidence for stenosis. There was no significant regurgitation. Tricuspid valve:  The valve is structurally normal. Leaflet separation was normal.  Doppler:  Transvalvular velocity was within the normal range. There was no evidence for stenosis. There was moderate-severe regurgitation. Pulmonic valve:   The valve is structurally normal. Cusp separation was normal.  Doppler:  Transvalvular velocity was within the normal range. There was no evidence for stenosis. There was no significant regurgitation. Pericardium:   There was no pericardial effusion. Aorta: Aortic root: The aortic root was normal. Ascending aorta: The ascending aorta was normal. Pulmonary arteries: Systolic pressure was moderately to markedly increased, estimated to be 69mm Hg. Systemic veins:  Central venous respirophasic diameter changes are blunted (< 50%). Inferior vena cava: The IVC was normal-sized. ---------------------------------------------------------------------------- Measurements  Left ventricle                    Value        Ref  IVS thickness, ED, PLAX           0.7   cm     0.6 -                                                  0.9  LV ID, ED, PLAX               (L) 3.7   cm     3.8 -                                                 5.2  LV ID, ES, PLAX                   2.6   cm     2.2 -                                                 3.5  LV PW thickness, ED, PLAX         0.7   cm     0.6 -                                                 0.9  IVS/LV PW ratio, ED, PLAX         1.00         --------  LV PW/LV ID ratio, ED, PLAX       0.19         --------  LV ejection fraction              58    %      54 - 74  Stroke volume/bsa, 2D             16    ml/m^2 --------  LV e', lateral                    10.0  cm/sec >=10.0  LV E/e', lateral                  13           <=13  LV e', medial                     7.8   cm/sec >=7.0  LV E/e', medial                   16           --------  LV e', average                    8.9   cm/sec --------  LV E/e', average                  14           <=14  LVOT                              Value        Ref  LVOT ID                           1.8   cm     --------  LVOT peak velocity, S             0.73  m/sec  --------  LVOT VTI, S                       9.7   cm     --------  LVOT peak gradient, S             2     mm Hg  --------  LVOT mean gradient, S             1     mm Hg  --------  Stroke volume (SV), LVOT DP       25    ml     --------  Stroke index (SV/bsa), LVOT       16    ml/m^2 --------  DP  Aortic root                       Value        Ref  Aortic root ID                    3.1   cm     2.3 -                                                 3.8  Ascending aorta                   Value        Ref  Ascending aorta ID                2.8   cm     1.9 -                                                 3.5  Left atrium                       Value        Ref  LA ID, A-P, ES                    2.8   cm     2.7 -                                                 3.8  LA volume, S                      45    ml     22 - 52  LA volume/bsa, S                  29    ml/m^2 16 - 34   LA volume, ES, 1-p A4C            43    ml     22 - 52  LA volume, ES, 1-p A2C            39    ml     22 - 52  LA volume, ES, A/L                47    ml     --------  LA volume/bsa, ES, A/L            31    ml/m^2 16 - 34  LA/aortic root ratio              0.9          --------  Mitral valve                      Value        Ref  Mitral E-wave peak velocity       1.28  m/sec  --------  Mitral A-wave peak velocity       0.47  m/sec  --------  Mitral peak gradient, D           7     mm Hg  --------  Mitral E/A ratio, peak            2.7          --------  Pulmonary artery                  Value        Ref  PA pressure, S, DP                69    mm Hg  --------  Tricuspid valve                   Value        Ref  Tricuspid regurg peak         (H) 3.9   m/sec  <=2.8  velocity  Tricuspid peak RV-RA gradient     61    mm Hg  --------  Systemic veins                    Value        Ref  Estimated CVP                     8     mm Hg  --------  Inferior vena cava                Value        Ref  ID                                1.6   cm     <=2.1  Right ventricle                   Value        Ref  TAPSE, MM                     (L) 1.56  cm     >=1.70  RV pressure, S, DP                69    mm Hg  --------  RV s', lateral                    10.0  cm/sec >=9.5 Legend: (L)  and  (H)  tylor values outside specified reference range. ---------------------------------------------------------------------------- Prepared and electronically signed by Tj Lange 05/02/2024 10:43         Cath Angiogram Results (HF Patients only)    No exam resulted this encounter.          Physical Therapy Notes (last 72 hours)      Physical Therapy Note signed by Anneliese Loyd PT at 5/2/2024  1:48 PM  Version 1 of 1    Author: Anneliese Loyd PT Service: Physical Medicine and Rehabilitation Author Type: Physical Therapist    Filed: 5/2/2024  1:48 PM Date of Service: 5/2/2024 10:58 AM Status: Signed    : Anneliese Loyd PT (Physical  Therapist)       PHYSICAL THERAPY EVALUATION - INPATIENT     Room Number: 336/336-A  Evaluation Date: 5/2/2024  Type of Evaluation: Initial   Physician Order: PT Eval and Treat    Presenting Problem: HF, acute on chronic respiratory failure, fall  Co-Morbidities : COPD, HTN, HF, OA, vertebral compression fx's 2/2 falls, hx of DEAN  Reason for Therapy: Mobility Dysfunction and Discharge Planning    PHYSICAL THERAPY ASSESSMENT   Patient is a 89 year old female admitted 5/1/2024 for HF, acute on chronic respiratory failure, fall.  Prior to admission, patient's baseline is [place] using a rolling walker, lives alone, niece lives down street and intermittently assists with IADLs.  Patient is currently functioning below baseline with bed mobility, transfers, gait, stair negotiation, and performing household tasks.  Patient is requiring minimal assist and moderate assist as a result of the following impairments: decreased endurance/aerobic capacity, impaired standing static and dynamic balance, decreased muscular endurance, medical status, and increased O2 needs from baseline.  Physical Therapy will continue to follow for duration of hospitalization.    Patient will benefit from continued skilled PT Services to promote return to prior level of function and safety with continuous assistance and gradual rehabilitative therapy .    PLAN  PT Treatment Plan: Body mechanics;Endurance;Energy conservation;Patient education;Gait training;Strengthening;Stair training;Transfer training;Balance training;Neuromuscular re-educate  Rehab Potential : Fair  Frequency (Obs): 3-5x/week    PHYSICAL THERAPY MEDICAL/SOCIAL HISTORY   History related to current admission: per CM/SW note: Patient is not in agreement with [facility] placement at this time. Patient is not in agreement with home PT at this time. Only agreeable to a home health RN.     Problem List  Principal Problem:    Heart failure, unspecified HF chronicity, unspecified heart failure type  ([facility])  Active Problems:    Fall, initial encounter    Acute on chronic respiratory failure with hypoxia ([facility])      HOME SITUATION  Home Situation  Type of Home: House  Home Layout: Two level  Stairs to Enter : 4  Railing: Yes  Stairs to Bedroom: 10  Railing: Yes  Lives With: Alone (niece lives nearby to assist with groceries, housekeeping for cleaning)  Drives: No  Patient Owned Equipment: Rolling walker  Patient Regularly Uses: Home O2 (5L)     Prior Level of [place]: Sunil using a rolling walker, lives alone, niece lives down street and intermittently assists with IADLs    SUBJECTIVE  \"I'm so tired right now.\"    PHYSICAL THERAPY EXAMINATION   OBJECTIVE  Precautions: Cardiac;Hard of hearing  Fall Risk: High fall risk    WEIGHT BEARING RESTRICTION  Weight Bearing Restriction: None                PAIN ASSESSMENT  Rating: Unable to rate  Location: \"all over\"  Management Techniques: Body mechanics;Activity promotion    COGNITION  Memory:  impaired working memory and decreased recall of recent events  Awareness of Deficits:  decreased awareness of deficits  Problem Solving:  assistance required to identify errors made, assistance required to generate solutions, and assistance required to implement solutions    RANGE OF MOTION AND STRENGTH ASSESSMENT  Upper extremity ROM and strength are within functional limits  BUEs  Lower extremity ROM is within functional limits  BLEs  Lower extremity strength is limited 2/2 decreased muscular endurance, hip extensors functionaly weak 2/2 increased assist for sit-to-stand transfers and maintaining upright posture    BALANCE  Static Sitting: Good  Dynamic Sitting: Fair +  Static Standing: Fair  Dynamic Standing: Fair -      ACTIVITY TOLERANCE  Pulse: 89 (at rest, 120s with activity)                      O2 WALK  Oxygen Therapy  SPO2% on Oxygen at Rest: 90  At rest oxygen flow (liters per minute): 4  SPO2% Ambulation on Oxygen: 84  Ambulation oxygen flow (liters per minute):  5    AM-PAC '6-Clicks' INPATIENT SHORT FORM - BASIC MOBILITY  How much difficulty does the patient currently have...  Patient Difficulty: Turning over in bed (including adjusting bedclothes, sheets and blankets)?: A Little   Patient Difficulty: Sitting down on and standing up from a chair with arms (e.g., wheelchair, bedside commode, etc.): A Lot   Patient Difficulty: Moving from lying on back to sitting on the side of the bed?: A Little   How much help from another person does the patient currently need...   Help from Another: Moving to and from a bed to a chair (including a wheelchair)?: A Little   Help from Another: Need to walk in hospital room?: A Little   Help from Another: Climbing 3-5 steps with a railing?: A Lot     AM-PAC Score:  Raw Score: 16   Approx Degree of Impairment: 54.16%   Standardized Score (AM-PAC Scale): 40.78   CMS Modifier (G-Code): CK    FUNCTIONAL ABILITY STATUS  Functional Mobility/Gait Assessment  Gait Assistance: Contact guard assist;Minimum assistance  Distance (ft): 20'+6'  Assistive Device: Rolling walker  Pattern: Shuffle (kyphotic posture with decreased hip extensor muscular endurance, dfecreased BLE foot clearance with Deidre for managing walker and cues to maintain proximity to walker)  Sit to Supine: maximum assist  Sit to Stand: moderate assist and maximum assist - from bedside chair, toilet, delayed hip extension, decreased forward weight shift with posterior lean    ADDITIONAL INFORMATION    Pt requiring increased assist with sit-to-stand transfers, presents with decreased aerobic capacity limiting ability to tolerate standing and ambulating household distances. Pt demonstrates decreased insight and impaired standing balance with assist required for managing walker.    Patient seen for evaluation in coordination with occupational therapy to maximize patient safety and function given decreased endurance and activity tolerance. Patient received seated in bedside chair, agreeable  to physical therapy evaluation. Vital signs monitored as noted above, patient experiencing dyspnea on exertion with difficulty reading SpO2 on ear and bilateral fingers, one SpO2 reading during activity 84% . Education with patient and patient's niece provided verbally on physical therapy plan of care, physiological benefits of out of bed mobility, energy conservation/activity pacing, and breathing technique. Next session anticipate to progress transfers and gait.    Patient history and/or personal factors that may impact the plan of care include home accessibility concerns, limited social support, history of falls, cognitive deficits, and co-morbidities (COPD, HTN, HF, OA, vertebral compression fx's 2/2 falls, hx of DEAN) affecting O2 needs, medical status, endurance, balance, posture . Based on the physical therapy examination of the noted systems and functional activity/participation limitations, the patient presentation is evolving given the patient demonstrates worsening of previously stable condition, demonstrates worsening of co-morbidities, and has history of frequent/recent falls.      Exercise/Education Provided:  Bed mobility  Body mechanics  Energy conservation  Functional activity tolerated  Gait training  Posture  Transfer training    The patient's Approx Degree of Impairment: 54.16% has been calculated based on documentation in the [facility] '6 clicks' Inpatient Basic Mobility Short Form.  Research supports that patients with this level of impairment may benefit from a rehab facility.  Final disposition will be made by interdisciplinary medical team.    Patient End of Session: In bed;Needs met;Call light within reach;RN aware of session/findings;All patient questions and concerns addressed;Alarm set;Family present    CURRENT GOALS  Goals to be met by: 5/20/24  Patient Goal Patient's self-stated goal is: return home safely   Goal #1 Patient is able to demonstrate supine - sit EOB @ level: modified  independent     Goal #1   Current Status    Goal #2 Patient is able to demonstrate transfers EOB to/from [facility] at assistance level: contact guard assistance with walker - rolling     Goal #2  Current Status    Goal #3 Patient is able to ambulate 50 feet with assist device: walker - rolling at assistance level: contact guard assistance   Goal #3   Current Status    Goal #4 Patient will negotiate 4 stairs/one curb w/ assistive device and minimum assistance   Goal #4   Current Status    Goal #5 Patient to demonstrate independence with home activity/exercise instructions provided to patient in preparation for discharge.   Goal #5   Current Status    Goal #6    Goal #6  Current Status      Patient Evaluation Complexity Level:  History Moderate - 1 or 2 personal factors and/or co-morbidities   Examination of body systems Moderate - addressing a total of 3 or more elements   Clinical Presentation  Moderate - Evolving   Clinical Decision Making  Moderate Complexity     Gait Trainin minutes  Therapeutic Activity:  30 minutes      Anneliese Loyd PT, DPT  [facility]  Rehab Services - Physical Therapy  [id number]                  Occupational Therapy Notes (last 72 hours)      Occupational Therapy Note signed by Beverley Mcghee OT at 2024  4:43 PM  Version 1 of 1    Author: Beverley Mcghee OT Service: Rehab Author Type: Occupational Therapist    Filed: 2024  4:43 PM Date of Service: 2024 11:00 AM Status: Signed    : Beverley Mcghee OT (Occupational Therapist)       OCCUPATIONAL THERAPY EVALUATION - INPATIENT     Room Number: 336/336-A  Evaluation Date: 2024  Type of Evaluation: Initial       Physician Order: IP Consult to Occupational Therapy  Reason for Therapy: ADL/IADL Dysfunction and Discharge Planning    OCCUPATIONAL THERAPY ASSESSMENT     Patient is a 89 year old female admitted 2024 for CHF; intestinal lung disease; chronic O2 use.  Prior to admission, pt was  independent; more difficulty with self-care lately.  Patient is currently requiring up to max A for ADLs overall along with mod A for sit to supine and STS and for functional transfer at RW level. Pt tolerated about <1 minutes of standing in supported standing.  Pt has the following impairments: decreased functional strength, decreased functional reach, decreased endurance, medical status, decreased insight to deficits, and decreased safety awareness.    RN cleared pt for participation in OT session, which was completed in collaboration with PT. Upon arrival, pt was seated in bedside chair  and agreeable to activity. Aunt present during session. Gait belt used. Pt was left in bed and alarm was activated. Call light and all needs left in reach. Handoff given to RN.    Education provided  Educated pt about role of OT and hospital therapy process as well as proper safety techniques, deep breathing techniques, and benefits of rehab. Pt verbalized/demonstrated fair carryover.    Patient will benefit from continued skilled OT Services to promote return to prior level of function and safety with continuous assistance and gradual rehabilitative therapy     PLAN  OT Treatment Plan: Balance activities;Energy conservation/work simplification techniques;Continued evaluation;Compensatory technique education;Fine motor coordination activities;Neuromuscluar reeducation;Equipment eval/education;Patient/Family training;Patient/Family education;Cognitive reorientation;Endurance training;UE strengthening/ROM;Functional transfer training;Visual perceptual training;IADL training;ADL training      OCCUPATIONAL THERAPY MEDICAL/SOCIAL HISTORY     Problem List  Principal Problem:    Heart failure, unspecified HF chronicity, unspecified heart failure type (HCC)  Active Problems:    Fall, initial encounter    Acute on chronic respiratory failure with hypoxia (HCC)    Goals of care, counseling/discussion    Advance care planning    Palliative  care by specialist      Past Medical History  Past Medical History:    Appendicitis    APPENDECTOMY    Arthritis    Basal cell carcinoma    Of Face     Chicken pox    COPD (chronic obstructive pulmonary disease) (HCC)    Left arm numbness    Measles    Other and unspecified hyperlipidemia    Pneumonia    Tonsillitis    Unspecified essential hypertension       Past Surgical History  Past Surgical History:   Procedure Laterality Date    Appendectomy      Cataract extraction Bilateral     Dr. Tirado's office    Glaucoma surgery      Hip replacement surgery      Skin surgery  Excision of Basal cell carcinoma of face     Tonsillectomy      T&A       HOME SITUATION  Type of Home: House  Home Layout: Multi-level  Lives With: Alone                      Drives: No  Patient Regularly Uses: Home O2 (5L)    SUBJECTIVE  \"I have to use the bathroom.\"    OCCUPATIONAL THERAPY EXAMINATION      OBJECTIVE  Precautions: Cardiac;Hard of hearing  Fall Risk: High fall risk    PAIN ASSESSMENT  Ratin          O2 SATURATIONS  Oxygen Therapy  SPO2% on Oxygen at Rest: 90  At rest oxygen flow (liters per minute): 4  SPO2% Ambulation on Oxygen: 84  Ambulation oxygen flow (liters per minute): 5       RANGE OF MOTION   Upper extremity ROM is within functional limits     STRENGTH ASSESSMENT  Upper extremity strength is within functional limits     COORDINATION  Gross Motor: WFL   Fine Motor: WFL     ACTIVITIES OF DAILY LIVING ASSESSMENT  AM-PAC ‘6-Clicks’ Inpatient Daily Activity Short Form  How much help from another person does the patient currently need…  -   Putting on and taking off regular lower body clothing?: A Lot  -   Bathing (including washing, rinsing, drying)?: A Lot  -   Toileting, which includes using toilet, bedpan or urinal? : A Lot  -   Putting on and taking off regular upper body clothing?: A Little  -   Taking care of personal grooming such as brushing teeth?: A Little  -   Eating meals?: None    AM-PAC Score:  Score:  16  Approx Degree of Impairment: 53.32%  Standardized Score (AM-PAC Scale): 35.96  CMS Modifier (G-Code): CK      BED MOBILITY  Sit to Supine (OT): Moderate Assist      FUNCTIONAL TRANSFER ASSESSMENT        Sit to stand: mod A  Toilet Transfer: mod A  Chair Transfer: mod A    FUNCTIONAL ADL ASSESSMENT  Overall max A    The patient's Approx Degree of Impairment: 53.32% has been calculated based on documentation in the [facility] '6 clicks' Inpatient Daily Activity Short Form.  Research supports that patients with this level of impairment may benefit from [facility]. Final disposition will be made by interdisciplinary medical team.    OT Goals  Patients self stated goal is: to get stronger     Patient will complete functional transfer with SBA  Comment:     Patient will complete toileting with min A  Comment:     Patient will tolerate standing for 1 minutes in prep for adls with SBA   Comment:    Patient will complete item retrieval with SBA  Comment:          Goals  on:   Frequency: 3-5x/wk    Patient Evaluation Complexity Level:   Occupational Profile/Medical History MODERATE - Expanded review of history including review of medical or therapy record   Specific performance deficits impacting engagement in ADL/IADL MODERATE  3 - 5 performance deficits   Client Assessment/Performance Deficits MODERATE - Comorbidities and min to mod modifications of tasks    Clinical Decision Making MODERATE - Analysis of occupational profile, detailed assessments, several treatment options    Overall Complexity MODERATE     OT Session Time: 45 minutes  Self-Care Home Management: 23 minutes           Beverley Mcghee OT  [facility]  Inpatient Rehabilitation  Occupational Therapy  [phone]               Video Swallow Study Notes    No notes of this type exist for this encounter.     SLP Notes    No notes of this type exist for this encounter.     Immunizations     Name Date      Covid-19 Moderna 22     Covid-19  Moderna 11/02/21     Covid-19 Moderna 03/29/21     Covid-19 Moderna 02/28/21     Fluad 0.5ml 10/14/20     INFLUENZA 10/01/21     INFLUENZA 09/01/17     INFLUENZA 10/17/15     INFLUENZA 10/27/05     INFLUENZA 10/19/04     Influenza Virus Vaccine, H1N1 12/23/09     Pneumococcal (Prevnar 13) 07/10/17     Pneumovax 23 10/23/04       Future Appointments        Provider Department [place]    10/28/2024 3:15 PM Placido Hutchins MD [facility]      Multidisciplinary Problems     Active Goals        Problem: Patient/Family Goals    Goal Priority Disciplines Outcome Interventions   Patient/Family Long Term Goal     Interdisciplinary Progressing    Description: Patient's Long Term Goal: ***    Interventions:  - ***  - See additional Care Plan goals for specific interventions   Patient/Family Short Term Goal     Interdisciplinary Progressing    Description: Patient's Short Term Goal: ***    Interventions:   - ***  - See additional Care Plan goals for specific interventions